# Patient Record
Sex: FEMALE | Race: BLACK OR AFRICAN AMERICAN | NOT HISPANIC OR LATINO
[De-identification: names, ages, dates, MRNs, and addresses within clinical notes are randomized per-mention and may not be internally consistent; named-entity substitution may affect disease eponyms.]

---

## 2017-01-24 ENCOUNTER — NON-APPOINTMENT (OUTPATIENT)
Age: 82
End: 2017-01-24

## 2017-01-24 ENCOUNTER — LABORATORY RESULT (OUTPATIENT)
Age: 82
End: 2017-01-24

## 2017-01-24 ENCOUNTER — APPOINTMENT (OUTPATIENT)
Dept: CARDIOLOGY | Facility: CLINIC | Age: 82
End: 2017-01-24

## 2017-01-24 VITALS — DIASTOLIC BLOOD PRESSURE: 68 MMHG | HEART RATE: 76 BPM | HEIGHT: 59 IN | SYSTOLIC BLOOD PRESSURE: 132 MMHG

## 2017-01-24 DIAGNOSIS — M79.89 OTHER SPECIFIED SOFT TISSUE DISORDERS: ICD-10-CM

## 2017-01-24 DIAGNOSIS — Z95.1 PRESENCE OF AORTOCORONARY BYPASS GRAFT: ICD-10-CM

## 2017-01-27 LAB
25(OH)D3 SERPL-MCNC: 30 NG/ML
ALBUMIN SERPL ELPH-MCNC: 4.2 G/DL
ALP BLD-CCNC: 128 U/L
ALT SERPL-CCNC: 19 U/L
ANION GAP SERPL CALC-SCNC: 20 MMOL/L
AST SERPL-CCNC: 31 U/L
BASOPHILS # BLD AUTO: 0.01 K/UL
BASOPHILS NFR BLD AUTO: 0.2 %
BILIRUB SERPL-MCNC: 0.4 MG/DL
BUN SERPL-MCNC: 21 MG/DL
CALCIUM SERPL-MCNC: 9.4 MG/DL
CHLORIDE SERPL-SCNC: 102 MMOL/L
CHOLEST SERPL-MCNC: 217 MG/DL
CHOLEST/HDLC SERPL: 2.6 RATIO
CO2 SERPL-SCNC: 21 MMOL/L
CREAT SERPL-MCNC: 1.77 MG/DL
EOSINOPHIL # BLD AUTO: 0.12 K/UL
EOSINOPHIL NFR BLD AUTO: 2.7 %
FT4I SERPL CALC-MCNC: 13.8 INDEX
GLUCOSE SERPL-MCNC: 108 MG/DL
HBA1C MFR BLD HPLC: 5.8 %
HCT VFR BLD CALC: 35.4 %
HDLC SERPL-MCNC: 84 MG/DL
HGB BLD-MCNC: 11.5 G/DL
IMM GRANULOCYTES NFR BLD AUTO: 0 %
LDLC SERPL CALC-MCNC: 122 MG/DL
LYMPHOCYTES # BLD AUTO: 1.9 K/UL
LYMPHOCYTES NFR BLD AUTO: 42.5 %
MAN DIFF?: NORMAL
MCHC RBC-ENTMCNC: 27.8 PG
MCHC RBC-ENTMCNC: 32.5 GM/DL
MCV RBC AUTO: 85.5 FL
MONOCYTES # BLD AUTO: 0.44 K/UL
MONOCYTES NFR BLD AUTO: 9.8 %
NEUTROPHILS # BLD AUTO: 2 K/UL
NEUTROPHILS NFR BLD AUTO: 44.8 %
PLATELET # BLD AUTO: 186 K/UL
POTASSIUM SERPL-SCNC: 4.2 MMOL/L
PROT SERPL-MCNC: 7.8 G/DL
RBC # BLD: 4.14 M/UL
RBC # FLD: 16.3 %
SODIUM SERPL-SCNC: 143 MMOL/L
T3 SERPL-MCNC: 60 NG/DL
T3RU NFR SERPL: 0.81 INDEX
T4 SERPL-MCNC: 11.1 UG/DL
TRIGL SERPL-MCNC: 57 MG/DL
TSH SERPL-ACNC: 1.07 UIU/ML
WBC # FLD AUTO: 4.47 K/UL

## 2017-02-01 ENCOUNTER — MEDICATION RENEWAL (OUTPATIENT)
Age: 82
End: 2017-02-01

## 2017-03-08 ENCOUNTER — RX RENEWAL (OUTPATIENT)
Age: 82
End: 2017-03-08

## 2017-05-13 ENCOUNTER — RX RENEWAL (OUTPATIENT)
Age: 82
End: 2017-05-13

## 2017-06-22 ENCOUNTER — APPOINTMENT (OUTPATIENT)
Dept: CARDIOLOGY | Facility: CLINIC | Age: 82
End: 2017-06-22

## 2017-06-22 ENCOUNTER — NON-APPOINTMENT (OUTPATIENT)
Age: 82
End: 2017-06-22

## 2017-06-22 ENCOUNTER — LABORATORY RESULT (OUTPATIENT)
Age: 82
End: 2017-06-22

## 2017-06-22 VITALS
BODY MASS INDEX: 29.84 KG/M2 | HEART RATE: 66 BPM | DIASTOLIC BLOOD PRESSURE: 68 MMHG | HEIGHT: 59 IN | SYSTOLIC BLOOD PRESSURE: 129 MMHG | WEIGHT: 148 LBS

## 2017-06-22 DIAGNOSIS — M79.602 PAIN IN LEFT ARM: ICD-10-CM

## 2017-06-22 DIAGNOSIS — E78.1 PURE HYPERGLYCERIDEMIA: ICD-10-CM

## 2017-06-24 LAB
25(OH)D3 SERPL-MCNC: 32.1 NG/ML
ALBUMIN SERPL ELPH-MCNC: 3.9 G/DL
ALP BLD-CCNC: 118 U/L
ALT SERPL-CCNC: 24 U/L
ANION GAP SERPL CALC-SCNC: 18 MMOL/L
APPEARANCE: CLEAR
AST SERPL-CCNC: 26 U/L
BACTERIA: ABNORMAL
BASOPHILS # BLD AUTO: 0.02 K/UL
BASOPHILS NFR BLD AUTO: 0.4 %
BILIRUB SERPL-MCNC: 0.3 MG/DL
BILIRUBIN URINE: NEGATIVE
BLOOD URINE: NEGATIVE
BUN SERPL-MCNC: 36 MG/DL
CALCIUM SERPL-MCNC: 8.9 MG/DL
CHLORIDE SERPL-SCNC: 104 MMOL/L
CHOLEST SERPL-MCNC: 158 MG/DL
CHOLEST/HDLC SERPL: 2.4 RATIO
CO2 SERPL-SCNC: 20 MMOL/L
COLOR: YELLOW
CREAT SERPL-MCNC: 2.34 MG/DL
EOSINOPHIL # BLD AUTO: 0.11 K/UL
EOSINOPHIL NFR BLD AUTO: 2.4 %
FT4I SERPL CALC-MCNC: 14.6 INDEX
GLUCOSE QUALITATIVE U: NORMAL MG/DL
GLUCOSE SERPL-MCNC: 111 MG/DL
HBA1C MFR BLD HPLC: 5.8 %
HCT VFR BLD CALC: 32.3 %
HDLC SERPL-MCNC: 65 MG/DL
HGB BLD-MCNC: 10.5 G/DL
HYALINE CASTS: 0 /LPF
IMM GRANULOCYTES NFR BLD AUTO: 0.2 %
KETONES URINE: NEGATIVE
LDLC SERPL CALC-MCNC: 84 MG/DL
LEUKOCYTE ESTERASE URINE: ABNORMAL
LYMPHOCYTES # BLD AUTO: 1.31 K/UL
LYMPHOCYTES NFR BLD AUTO: 29.1 %
MAN DIFF?: NORMAL
MCHC RBC-ENTMCNC: 28.3 PG
MCHC RBC-ENTMCNC: 32.5 GM/DL
MCV RBC AUTO: 87.1 FL
MICROSCOPIC-UA: NORMAL
MONOCYTES # BLD AUTO: 0.48 K/UL
MONOCYTES NFR BLD AUTO: 10.7 %
NEUTROPHILS # BLD AUTO: 2.57 K/UL
NEUTROPHILS NFR BLD AUTO: 57.2 %
NITRITE URINE: NEGATIVE
PH URINE: 6
PLATELET # BLD AUTO: 214 K/UL
POTASSIUM SERPL-SCNC: 4.7 MMOL/L
PROT SERPL-MCNC: 7.5 G/DL
PROTEIN URINE: NEGATIVE MG/DL
RBC # BLD: 3.71 M/UL
RBC # FLD: 16.2 %
RED BLOOD CELLS URINE: 2 /HPF
SODIUM SERPL-SCNC: 142 MMOL/L
SPECIFIC GRAVITY URINE: 1.01
SQUAMOUS EPITHELIAL CELLS: 2 /HPF
T3 SERPL-MCNC: 56 NG/DL
T3RU NFR SERPL: 0.8 INDEX
T4 SERPL-MCNC: 11.7 UG/DL
TRIGL SERPL-MCNC: 46 MG/DL
TSH SERPL-ACNC: 1.71 UIU/ML
UROBILINOGEN URINE: NORMAL MG/DL
WBC # FLD AUTO: 4.5 K/UL
WHITE BLOOD CELLS URINE: 3 /HPF

## 2017-07-12 ENCOUNTER — LABORATORY RESULT (OUTPATIENT)
Age: 82
End: 2017-07-12

## 2017-07-13 ENCOUNTER — RX RENEWAL (OUTPATIENT)
Age: 82
End: 2017-07-13

## 2017-08-01 ENCOUNTER — RX RENEWAL (OUTPATIENT)
Age: 82
End: 2017-08-01

## 2017-08-04 ENCOUNTER — MEDICATION RENEWAL (OUTPATIENT)
Age: 82
End: 2017-08-04

## 2017-08-14 ENCOUNTER — RX RENEWAL (OUTPATIENT)
Age: 82
End: 2017-08-14

## 2017-08-18 ENCOUNTER — INPATIENT (INPATIENT)
Facility: HOSPITAL | Age: 82
LOS: 3 days | Discharge: ANOTHER HOSPITAL | End: 2017-08-22
Attending: INTERNAL MEDICINE | Admitting: INTERNAL MEDICINE
Payer: COMMERCIAL

## 2017-08-18 VITALS
HEART RATE: 65 BPM | TEMPERATURE: 98 F | HEIGHT: 62 IN | RESPIRATION RATE: 20 BRPM | OXYGEN SATURATION: 97 % | DIASTOLIC BLOOD PRESSURE: 71 MMHG | SYSTOLIC BLOOD PRESSURE: 144 MMHG | WEIGHT: 139.99 LBS

## 2017-08-18 LAB
ALBUMIN SERPL ELPH-MCNC: 3.1 G/DL — LOW (ref 3.3–5)
ALP SERPL-CCNC: 124 U/L — HIGH (ref 40–120)
ALT FLD-CCNC: 40 U/L — SIGNIFICANT CHANGE UP (ref 12–78)
ANION GAP SERPL CALC-SCNC: 10 MMOL/L — SIGNIFICANT CHANGE UP (ref 5–17)
ANISOCYTOSIS BLD QL: SLIGHT — SIGNIFICANT CHANGE UP
APPEARANCE UR: ABNORMAL
APTT BLD: 31 SEC — SIGNIFICANT CHANGE UP (ref 27.5–37.4)
AST SERPL-CCNC: 40 U/L — HIGH (ref 15–37)
BACTERIA # UR AUTO: ABNORMAL
BASOPHILS NFR BLD AUTO: 1 % — SIGNIFICANT CHANGE UP (ref 0–2)
BILIRUB SERPL-MCNC: 0.4 MG/DL — SIGNIFICANT CHANGE UP (ref 0.2–1.2)
BILIRUB UR-MCNC: NEGATIVE — SIGNIFICANT CHANGE UP
BUN SERPL-MCNC: 31 MG/DL — HIGH (ref 7–23)
CALCIUM SERPL-MCNC: 8.4 MG/DL — LOW (ref 8.5–10.1)
CHLORIDE SERPL-SCNC: 97 MMOL/L — SIGNIFICANT CHANGE UP (ref 96–108)
CK MB BLD-MCNC: 0.6 % — SIGNIFICANT CHANGE UP (ref 0–3.5)
CK MB CFR SERPL CALC: 1.9 NG/ML — SIGNIFICANT CHANGE UP (ref 0.5–3.6)
CK SERPL-CCNC: 307 U/L — HIGH (ref 26–192)
CO2 SERPL-SCNC: 23 MMOL/L — SIGNIFICANT CHANGE UP (ref 22–31)
COLOR SPEC: YELLOW — SIGNIFICANT CHANGE UP
CREAT SERPL-MCNC: 2.25 MG/DL — HIGH (ref 0.5–1.3)
DIFF PNL FLD: ABNORMAL
EOSINOPHIL NFR BLD AUTO: 1 % — SIGNIFICANT CHANGE UP (ref 0–6)
EPI CELLS # UR: SIGNIFICANT CHANGE UP
GLUCOSE SERPL-MCNC: 97 MG/DL — SIGNIFICANT CHANGE UP (ref 70–99)
GLUCOSE UR QL: NEGATIVE MG/DL — SIGNIFICANT CHANGE UP
HCT VFR BLD CALC: 28.7 % — LOW (ref 34.5–45)
HGB BLD-MCNC: 9.6 G/DL — LOW (ref 11.5–15.5)
HYPOCHROMIA BLD QL: SLIGHT — SIGNIFICANT CHANGE UP
INR BLD: 1.2 RATIO — HIGH (ref 0.88–1.16)
KETONES UR-MCNC: NEGATIVE — SIGNIFICANT CHANGE UP
LACTATE SERPL-SCNC: 0.9 MMOL/L — SIGNIFICANT CHANGE UP (ref 0.7–2)
LEUKOCYTE ESTERASE UR-ACNC: ABNORMAL
LYMPHOCYTES # BLD AUTO: 28 % — SIGNIFICANT CHANGE UP (ref 13–44)
MCHC RBC-ENTMCNC: 29.2 PG — SIGNIFICANT CHANGE UP (ref 27–34)
MCHC RBC-ENTMCNC: 33.3 GM/DL — SIGNIFICANT CHANGE UP (ref 32–36)
MCV RBC AUTO: 87.7 FL — SIGNIFICANT CHANGE UP (ref 80–100)
MONOCYTES NFR BLD AUTO: 13 % — SIGNIFICANT CHANGE UP (ref 2–14)
NEUTROPHILS NFR BLD AUTO: 57 % — SIGNIFICANT CHANGE UP (ref 43–77)
NITRITE UR-MCNC: NEGATIVE — SIGNIFICANT CHANGE UP
NT-PROBNP SERPL-SCNC: 1908 PG/ML — HIGH (ref 0–450)
OVALOCYTES BLD QL SMEAR: SLIGHT — SIGNIFICANT CHANGE UP
PH UR: 6.5 — SIGNIFICANT CHANGE UP (ref 5–8)
PLAT MORPH BLD: NORMAL — SIGNIFICANT CHANGE UP
PLATELET # BLD AUTO: 195 K/UL — SIGNIFICANT CHANGE UP (ref 150–400)
POTASSIUM SERPL-MCNC: 5.6 MMOL/L — HIGH (ref 3.5–5.3)
POTASSIUM SERPL-SCNC: 5.6 MMOL/L — HIGH (ref 3.5–5.3)
PROT SERPL-MCNC: 7.4 GM/DL — SIGNIFICANT CHANGE UP (ref 6–8.3)
PROT UR-MCNC: NEGATIVE MG/DL — SIGNIFICANT CHANGE UP
PROTHROM AB SERPL-ACNC: 13.1 SEC — HIGH (ref 9.8–12.7)
RBC # BLD: 3.27 M/UL — LOW (ref 3.8–5.2)
RBC # FLD: 13.5 % — SIGNIFICANT CHANGE UP (ref 11–15)
RBC BLD AUTO: SIGNIFICANT CHANGE UP
RBC CASTS # UR COMP ASSIST: SIGNIFICANT CHANGE UP /HPF (ref 0–4)
SODIUM SERPL-SCNC: 130 MMOL/L — LOW (ref 135–145)
SP GR SPEC: 1 — LOW (ref 1.01–1.02)
TROPONIN I SERPL-MCNC: 0.09 NG/ML — HIGH (ref 0.01–0.04)
UROBILINOGEN FLD QL: NEGATIVE MG/DL — SIGNIFICANT CHANGE UP
WBC # BLD: 4.9 K/UL — SIGNIFICANT CHANGE UP (ref 3.8–10.5)
WBC # FLD AUTO: 4.9 K/UL — SIGNIFICANT CHANGE UP (ref 3.8–10.5)
WBC UR QL: SIGNIFICANT CHANGE UP

## 2017-08-18 PROCEDURE — 70450 CT HEAD/BRAIN W/O DYE: CPT | Mod: 26

## 2017-08-18 PROCEDURE — 71010: CPT | Mod: 26

## 2017-08-18 PROCEDURE — 93970 EXTREMITY STUDY: CPT | Mod: 26

## 2017-08-18 PROCEDURE — 99285 EMERGENCY DEPT VISIT HI MDM: CPT

## 2017-08-18 RX ORDER — ASPIRIN/CALCIUM CARB/MAGNESIUM 324 MG
325 TABLET ORAL DAILY
Qty: 0 | Refills: 0 | Status: DISCONTINUED | OUTPATIENT
Start: 2017-08-18 | End: 2017-08-22

## 2017-08-18 RX ORDER — VALSARTAN 80 MG/1
160 TABLET ORAL DAILY
Qty: 0 | Refills: 0 | Status: DISCONTINUED | OUTPATIENT
Start: 2017-08-18 | End: 2017-08-22

## 2017-08-18 RX ORDER — AMIODARONE HYDROCHLORIDE 400 MG/1
200 TABLET ORAL DAILY
Qty: 0 | Refills: 0 | Status: DISCONTINUED | OUTPATIENT
Start: 2017-08-18 | End: 2017-08-22

## 2017-08-18 RX ORDER — ALBUTEROL 90 UG/1
2.5 AEROSOL, METERED ORAL
Qty: 0 | Refills: 0 | Status: DISCONTINUED | OUTPATIENT
Start: 2017-08-18 | End: 2017-08-22

## 2017-08-18 RX ORDER — SODIUM POLYSTYRENE SULFONATE 4.1 MEQ/G
30 POWDER, FOR SUSPENSION ORAL ONCE
Qty: 0 | Refills: 0 | Status: COMPLETED | OUTPATIENT
Start: 2017-08-18 | End: 2017-08-18

## 2017-08-18 RX ORDER — DILTIAZEM HCL 120 MG
180 CAPSULE, EXT RELEASE 24 HR ORAL DAILY
Qty: 0 | Refills: 0 | Status: DISCONTINUED | OUTPATIENT
Start: 2017-08-18 | End: 2017-08-22

## 2017-08-18 RX ORDER — HEPARIN SODIUM 5000 [USP'U]/ML
5000 INJECTION INTRAVENOUS; SUBCUTANEOUS EVERY 12 HOURS
Qty: 0 | Refills: 0 | Status: DISCONTINUED | OUTPATIENT
Start: 2017-08-18 | End: 2017-08-21

## 2017-08-18 RX ORDER — SODIUM CHLORIDE 9 MG/ML
3 INJECTION INTRAMUSCULAR; INTRAVENOUS; SUBCUTANEOUS ONCE
Qty: 0 | Refills: 0 | Status: COMPLETED | OUTPATIENT
Start: 2017-08-18 | End: 2017-08-18

## 2017-08-18 RX ORDER — ALBUTEROL 90 UG/1
2.5 AEROSOL, METERED ORAL
Qty: 0 | Refills: 0 | Status: DISCONTINUED | OUTPATIENT
Start: 2017-08-18 | End: 2017-08-18

## 2017-08-18 RX ORDER — AZITHROMYCIN 500 MG/1
500 TABLET, FILM COATED ORAL ONCE
Qty: 0 | Refills: 0 | Status: COMPLETED | OUTPATIENT
Start: 2017-08-18 | End: 2017-08-18

## 2017-08-18 RX ORDER — CEFTRIAXONE 500 MG/1
1 INJECTION, POWDER, FOR SOLUTION INTRAMUSCULAR; INTRAVENOUS EVERY 24 HOURS
Qty: 0 | Refills: 0 | Status: DISCONTINUED | OUTPATIENT
Start: 2017-08-18 | End: 2017-08-22

## 2017-08-18 RX ORDER — AZITHROMYCIN 500 MG/1
500 TABLET, FILM COATED ORAL EVERY 24 HOURS
Qty: 0 | Refills: 0 | Status: DISCONTINUED | OUTPATIENT
Start: 2017-08-18 | End: 2017-08-22

## 2017-08-18 RX ORDER — CEFTRIAXONE 500 MG/1
1 INJECTION, POWDER, FOR SOLUTION INTRAMUSCULAR; INTRAVENOUS ONCE
Qty: 0 | Refills: 0 | Status: COMPLETED | OUTPATIENT
Start: 2017-08-18 | End: 2017-08-18

## 2017-08-18 RX ORDER — FUROSEMIDE 40 MG
40 TABLET ORAL DAILY
Qty: 0 | Refills: 0 | Status: DISCONTINUED | OUTPATIENT
Start: 2017-08-18 | End: 2017-08-22

## 2017-08-18 RX ORDER — PANTOPRAZOLE SODIUM 20 MG/1
40 TABLET, DELAYED RELEASE ORAL
Qty: 0 | Refills: 0 | Status: DISCONTINUED | OUTPATIENT
Start: 2017-08-18 | End: 2017-08-22

## 2017-08-18 RX ADMIN — CEFTRIAXONE 100 GRAM(S): 500 INJECTION, POWDER, FOR SOLUTION INTRAMUSCULAR; INTRAVENOUS at 19:07

## 2017-08-18 RX ADMIN — AZITHROMYCIN 255 MILLIGRAM(S): 500 TABLET, FILM COATED ORAL at 19:44

## 2017-08-18 RX ADMIN — SODIUM POLYSTYRENE SULFONATE 30 GRAM(S): 4.1 POWDER, FOR SUSPENSION ORAL at 23:25

## 2017-08-18 RX ADMIN — SODIUM CHLORIDE 3 MILLILITER(S): 9 INJECTION INTRAMUSCULAR; INTRAVENOUS; SUBCUTANEOUS at 17:51

## 2017-08-18 NOTE — ED PROVIDER NOTE - MEDICAL DECISION MAKING DETAILS
Pt with ++ significant edema likely causing inability to ambulate. however pt also noted with renal insufficiency, K of 5 and hyponatremia and unknown baseline. will also treat for pna. us neg for dvt. CT scan demonstrates no acute pathology. d/w dr. grace for admission

## 2017-08-18 NOTE — ED PROVIDER NOTE - NIH STROKE SCALE: 5B. MOTOR ARM, RIGHT, QM
(1) Drift; limb holds 90 (or 45) degrees, but drifts down before full 10 secs; does not hit bed or other support/old

## 2017-08-18 NOTE — ED ADULT TRIAGE NOTE - CHIEF COMPLAINT QUOTE
patient c/o of weakness and unable to ambulated started 1 week ago , patient c/o of swelling lower extremities and mild difficulty breathing on and off for 1 week

## 2017-08-18 NOTE — ED PROVIDER NOTE - NIH STROKE SCALE: 6B. MOTOR LEG, RIGHT, QM
old/(2) Some effort against gravity; leg falls to bed by 5 secs, but has some effort against gravity

## 2017-08-18 NOTE — ED ADULT NURSE NOTE - OBJECTIVE STATEMENT
Pt c/o difficulty ambulating x 1 week, bilateral leg swelling noted. denies chest pain, sob. s/p fall today with left hematoma. hx: cva with right side weakness

## 2017-08-18 NOTE — ED PROVIDER NOTE - NIH STROKE SCALE: 5A. MOTOR ARM, LEFT, QM
old/(1) Drift; limb holds 90 (or 45) degrees, but drifts down before full 10 seconds; does not hit bed or other support

## 2017-08-18 NOTE — ED PROVIDER NOTE - OBJECTIVE STATEMENT
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 Pt is AOx2 and denies cp, sob, dizziness, headache. However, pt and aid are poor historians. Pt at baseline ambulates with walker.    ROS: No fever/chills. No photophobia/eye pain/changes in vision, No ear pain/sore throat/dysphagia, No chest pain/palpitations. No SOB/cough/stridor. No abdominal pain, N/V/D, no black/bloody bm. No dysuria/frequency/discharge, No headache. No Dizziness.  No rash.  No numbness/tingling/weakness.

## 2017-08-18 NOTE — ED PROVIDER NOTE - CARE PLAN
Principal Discharge DX:	CHF (congestive heart failure)  Secondary Diagnosis:	Pneumonia  Secondary Diagnosis:	Renal insufficiency

## 2017-08-18 NOTE — ED PROVIDER NOTE - PHYSICAL EXAMINATION
Gen: Alert, Well appearing. NAD    Head: NC, AT, PERRL, EOMI, normal lids/conjunctiva   ENT: Bilateral TM WNL, normal hearing, patent oropharynx without erythema/exudate, uvula midline  Neck: supple, no tenderness/meningismus/JVD   Pulm: Bilateral clear BS, normal resp effort, no wheeze/stridor/retractions  CV: RRR, no M/R/G, +dist pulses   Abd: soft, NT/ND, +BS, no guarding/rebound tenderness  Mskel: +++ pitting edema to thigh,   Skin: no rash   Neuro: AAOx2

## 2017-08-18 NOTE — ED ADULT NURSE NOTE - PSH
CAD S/P percutaneous coronary angioplasty  1 stent placed 2009  one stent placed 2010  H/O breast biopsy  left , years ago  rectal prolapse repaired  20+ years ago  S/P dilatation and curettage  years ago  S/P total knee replacement  bilateral in 2009

## 2017-08-18 NOTE — ED ADULT NURSE NOTE - PMH
Anxiety    Aortic stenosis  severe s/p cardiac cath 9/21/12  Asthma    Moe esophagus    Bilateral cataracts    CAD (coronary artery disease)    CVA (cerebral infarction)    H/O heart artery stent    H/O spinal stenosis    HLD (hyperlipidemia)    HTN (hypertension)    Hyperlipidemia    Hypertension    Myeloblastic anemia    Myelodysplastic syndrome  diagnosed 5 years ago  Osteoarthritis

## 2017-08-19 DIAGNOSIS — I25.10 ATHEROSCLEROTIC HEART DISEASE OF NATIVE CORONARY ARTERY WITHOUT ANGINA PECTORIS: ICD-10-CM

## 2017-08-19 DIAGNOSIS — I21.4 NON-ST ELEVATION (NSTEMI) MYOCARDIAL INFARCTION: ICD-10-CM

## 2017-08-19 DIAGNOSIS — I50.9 HEART FAILURE, UNSPECIFIED: ICD-10-CM

## 2017-08-19 LAB
ALBUMIN SERPL ELPH-MCNC: 2.8 G/DL — LOW (ref 3.3–5)
ALP SERPL-CCNC: 102 U/L — SIGNIFICANT CHANGE UP (ref 40–120)
ALT FLD-CCNC: 38 U/L — SIGNIFICANT CHANGE UP (ref 12–78)
ANION GAP SERPL CALC-SCNC: 11 MMOL/L — SIGNIFICANT CHANGE UP (ref 5–17)
AST SERPL-CCNC: 32 U/L — SIGNIFICANT CHANGE UP (ref 15–37)
BILIRUB SERPL-MCNC: 0.4 MG/DL — SIGNIFICANT CHANGE UP (ref 0.2–1.2)
BUN SERPL-MCNC: 28 MG/DL — HIGH (ref 7–23)
CALCIUM SERPL-MCNC: 8.1 MG/DL — LOW (ref 8.5–10.1)
CHLORIDE SERPL-SCNC: 101 MMOL/L — SIGNIFICANT CHANGE UP (ref 96–108)
CO2 SERPL-SCNC: 25 MMOL/L — SIGNIFICANT CHANGE UP (ref 22–31)
CREAT SERPL-MCNC: 2.15 MG/DL — HIGH (ref 0.5–1.3)
GLUCOSE SERPL-MCNC: 91 MG/DL — SIGNIFICANT CHANGE UP (ref 70–99)
HCT VFR BLD CALC: 26.4 % — LOW (ref 34.5–45)
HGB BLD-MCNC: 8.6 G/DL — LOW (ref 11.5–15.5)
MCHC RBC-ENTMCNC: 28.7 PG — SIGNIFICANT CHANGE UP (ref 27–34)
MCHC RBC-ENTMCNC: 32.4 GM/DL — SIGNIFICANT CHANGE UP (ref 32–36)
MCV RBC AUTO: 88.4 FL — SIGNIFICANT CHANGE UP (ref 80–100)
PLATELET # BLD AUTO: 184 K/UL — SIGNIFICANT CHANGE UP (ref 150–400)
POTASSIUM SERPL-MCNC: 4.5 MMOL/L — SIGNIFICANT CHANGE UP (ref 3.5–5.3)
POTASSIUM SERPL-SCNC: 4.5 MMOL/L — SIGNIFICANT CHANGE UP (ref 3.5–5.3)
PROT SERPL-MCNC: 6.5 GM/DL — SIGNIFICANT CHANGE UP (ref 6–8.3)
RBC # BLD: 2.99 M/UL — LOW (ref 3.8–5.2)
RBC # FLD: 13.2 % — SIGNIFICANT CHANGE UP (ref 11–15)
SODIUM SERPL-SCNC: 137 MMOL/L — SIGNIFICANT CHANGE UP (ref 135–145)
TROPONIN I SERPL-MCNC: 0.06 NG/ML — HIGH (ref 0.01–0.04)
TROPONIN I SERPL-MCNC: 0.07 NG/ML — HIGH (ref 0.01–0.04)
WBC # BLD: 4.4 K/UL — SIGNIFICANT CHANGE UP (ref 3.8–10.5)
WBC # FLD AUTO: 4.4 K/UL — SIGNIFICANT CHANGE UP (ref 3.8–10.5)

## 2017-08-19 PROCEDURE — 93010 ELECTROCARDIOGRAM REPORT: CPT

## 2017-08-19 RX ADMIN — CEFTRIAXONE 100 GRAM(S): 500 INJECTION, POWDER, FOR SOLUTION INTRAMUSCULAR; INTRAVENOUS at 17:42

## 2017-08-19 RX ADMIN — Medication 20 MILLIGRAM(S): at 13:09

## 2017-08-19 RX ADMIN — Medication 325 MILLIGRAM(S): at 11:44

## 2017-08-19 RX ADMIN — HEPARIN SODIUM 5000 UNIT(S): 5000 INJECTION INTRAVENOUS; SUBCUTANEOUS at 17:39

## 2017-08-19 RX ADMIN — ALBUTEROL 2.5 MILLIGRAM(S): 90 AEROSOL, METERED ORAL at 11:10

## 2017-08-19 RX ADMIN — ALBUTEROL 2.5 MILLIGRAM(S): 90 AEROSOL, METERED ORAL at 23:48

## 2017-08-19 RX ADMIN — AZITHROMYCIN 255 MILLIGRAM(S): 500 TABLET, FILM COATED ORAL at 17:42

## 2017-08-19 RX ADMIN — ALBUTEROL 2.5 MILLIGRAM(S): 90 AEROSOL, METERED ORAL at 17:01

## 2017-08-19 RX ADMIN — PANTOPRAZOLE SODIUM 40 MILLIGRAM(S): 20 TABLET, DELAYED RELEASE ORAL at 07:03

## 2017-08-19 RX ADMIN — ALBUTEROL 2.5 MILLIGRAM(S): 90 AEROSOL, METERED ORAL at 01:48

## 2017-08-19 RX ADMIN — ALBUTEROL 2.5 MILLIGRAM(S): 90 AEROSOL, METERED ORAL at 05:59

## 2017-08-19 RX ADMIN — Medication 0.1 MILLIGRAM(S): at 17:39

## 2017-08-19 RX ADMIN — HEPARIN SODIUM 5000 UNIT(S): 5000 INJECTION INTRAVENOUS; SUBCUTANEOUS at 07:03

## 2017-08-19 RX ADMIN — Medication 40 MILLIGRAM(S): at 07:01

## 2017-08-19 NOTE — DIETITIAN INITIAL EVALUATION ADULT. - OTHER INFO
Pt seen for CHF.  Pt's daughter does cooking & food shopping. Pt with poor dentition presents with no difficulty chewing or swallowing. Pt consumed 75% breakfast & dinner with assist. Last BM x 1(8/19).

## 2017-08-19 NOTE — H&P ADULT - NSHPPHYSICALEXAM_GEN_ALL_CORE
PHYSICAL EXAM:  GENERAL: Appears stated age, NAD  HEENT: Atraumatic, EOMI, hearing normal, conjunctiva and sclera clear  Chest: CTA bilaterally, no rhonchi, rales or wheezing, sternal scar  CV: S1S2, RRR, no murmurs, rubs, or edema  GI: soft, +BS, NT/ND,   Psychiatric: affect nL, mood nL  Skin: warm and dry  CNS: Awake alert cooperative and follows command. No sensory or focal deficits.    Ext: 2+ edema

## 2017-08-19 NOTE — H&P ADULT - HISTORY OF PRESENT ILLNESS
83yo female with pmh aortic stenosis s/p AVR, CAD with stents, CABG (2012), HTN, HLD, mult CVA with b/l weakness (Rt > Lt) presents with LE edema and pain noted today, and difficulty ambulating for 1 week. Pt is AOx2 and denies cp, sob, dizziness, headache. However, pt and aid are poor historians. Pt at baseline ambulates with walker. No falls was reported and she has no headaches.

## 2017-08-19 NOTE — H&P ADULT - NSHPLABSRESULTS_GEN_ALL_CORE
18-Aug-2017 16:47, Complete Blood Count + Automated Diff  · WBC Count	4.9	[3.8 - 10.5 K/uL]  · RBC Count	  3.27	[3.80 - 5.20 M/uL]  · Hemoglobin	  9.6	[11.5 - 15.5 g/dL]  · Hematocrit	  28.7	[34.5 - 45.0 %]  · Mean Cell Volume	87.7	[80.0 - 100.0 fl]  · Mean Cell Hemoglobin	29.2	[27.0 - 34.0 pg]  · Mean Cell Hemoglobin Conc	33.3	[32.0 - 36.0 gm/dL]  · Red Cell Distrib Width	13.5	[11.0 - 15.0 %]  · Platelet Count - Automated	195	[150 - 400 K/uL]  · Auto Neutrophil %	      18-Aug-2017 16:48, CKMB Mass Assay  · CKMB Units	1.9	[0.5 - 3.6 ng/mL]  · CPK Mass Assay %	0.6	[0.0 - 3.5 %]    18-Aug-2017 16:48, Comprehensive Metabolic Panel  · Sodium, Serum	  130	[135 - 145 mmol/L]  · Potassium, Serum	  5.6	[3.5 - 5.3 mmol/L]  · Chloride, Serum	97	[96 - 108 mmol/L]  · Carbon Dioxide, Serum	23	[22 - 31 mmol/L]  · Anion Gap, Serum	10	[5 - 17 mmol/L]  · Blood Urea Nitrogen, Serum	  31	[7 - 23 mg/dL]  · Creatinine, Serum	  2.25	[0.50 - 1.30 mg/dL]  · Glucose, Serum	97	[70 - 99 mg/dL]  · Calcium, Total Serum	  8.4	[8.5 - 10.1 mg/dL]  · Protein Total, Serum	7.4	[6.0 - 8.3 gm/dL]  · Albumin, Serum	  3.1	[3.3 - 5.0 g/dL]  · Bilirubin Total, Serum	0.4	[0.2 - 1.2 mg/dL]  · Alkaline Phosphatase, Serum	  124	[40 - 120 U/L]  · Aspartate Aminotransferase (AST/SGOT)	  40	[15 - 37 U/L]  · Alanine Aminotransferase (ALT/SGPT)	40	[12 - 78 U/L]  · eGFR if Non African American	  20	[>=60 mL/min/1.73M2]      18-Aug-2017 16:48, Creatine Kinase, Serum  · Creatine Kinase, Serum	  307	[26 - 192 U/L]    18-Aug-2017 16:48, Serum Pro-Brain Natriuretic Peptide  · Serum Pro-Brain Natriuretic Peptide	  1908	[0 - 450 pg/mL]    18-Aug-2017 16:48, Troponin I, Serum  · Troponin I, Serum	  .086	[.015 - .045 ng/mL]  Coagulation:    18-Aug-2017 16:47, Activated Partial Thromboplastin Time  · Activated Partial Thromboplastin Time	31.0	[27.5 - 37.4 sec]  The recommended therapeutic heparin range (full dose) is 58-99 seconds.  	Recommended therapeutic Argatroban range is 1.5 to 3.0 times the baseline  	APTT value, not to exceed 100 seconds. Recommended therapeutic Refludan  	range is 1.5 to 2.5 times thebaseline APTT.    18-Aug-2017 16:47, Prothrombin Time and INR, Plasma  · Prothrombin Time, Plasma	  13.1	[9.8 - 12.7 sec]  Effective March 21st, the reference range for PT has changed.  · INR	  1.20	[0.88 - 1.16 ratio]  Please note: New Critical Value: 5.0 ratio as of 1/2/14.

## 2017-08-19 NOTE — CONSULT NOTE ADULT - SUBJECTIVE AND OBJECTIVE BOX
Patient is a 82y old  Female who presents with a chief complaint of  SOB      PAST MEDICAL & SURGICAL HISTORY:  CVA (cerebral infarction)  Anxiety  HLD (hyperlipidemia)  HTN (hypertension)  Hypertension  Hyperlipidemia  Bilateral cataracts  Osteoarthritis  Myelodysplastic syndrome: diagnosed 5 years ago  H/O spinal stenosis  Aortic stenosis: severe s/p cardiac cath 12  Asthma  Myeloblastic anemia  Moe esophagus  H/O heart artery stent  CAD (coronary artery disease)  S/P total knee replacement: bilateral in   H/O breast biopsy: left , years ago  S/P dilatation and curettage: years ago  CAD S/P percutaneous coronary angioplasty: 1 stent placed   one stent placed   rectal prolapse repaired: 20+ years ago      Summary of admission HPI: NSTEMI                PREVIOUS DIAGNOSTIC TESTING:      ECHO  FINDINGS:    STRESS  FINDINGS:    CATHETERIZATION  FINDINGS:    ELECTROPHYSIOLOGY STUDY  FINDINGS:    CAROTID ULTRASOUND:  FINDINGS    VENOUS DUPLEX SCAN:  FINDINGS:    CHEST CT PULMONARY ANGIO with IV Contrast:  FINDINGS:  MEDICATIONS  (STANDING):  aspirin enteric coated 325 milliGRAM(s) Oral daily  valsartan 160 milliGRAM(s) Oral daily  cloNIDine 0.1 milliGRAM(s) Oral two times a day  amiodarone    Tablet 200 milliGRAM(s) Oral daily  diltiazem    milliGRAM(s) Oral daily  PARoxetine 20 milliGRAM(s) Oral daily  furosemide    Tablet 40 milliGRAM(s) Oral daily  pantoprazole    Tablet 40 milliGRAM(s) Oral before breakfast  heparin  Injectable 5000 Unit(s) SubCutaneous every 12 hours  cefTRIAXone   IVPB 1 Gram(s) IV Intermittent every 24 hours  azithromycin  IVPB 500 milliGRAM(s) IV Intermittent every 24 hours  ALBUTerol    0.083% 2.5 milliGRAM(s) Nebulizer four times a day    MEDICATIONS  (PRN):      FAMILY HISTORY:  Family history of diabetes mellitus (Child): daughter      SOCIAL HISTORY:    CIGARETTES:    ALCOHOL:    REVIEW OF SYSTEMS:    CONSTITUTIONAL: No fever, weight loss, chills, shakes, or fatigue  EYES: No eye pain, visual disturbances, or discharge  ENMT:  No difficulty hearing, tinnitus, vertigo; No sinus or throat pain  NECK: No pain or stiffness  BREASTS: No pain, masses, or nipple discharge  RESPIRATORY: No cough, wheezing, hemoptysis, or shortness of breath  CARDIOVASCULAR: No chest pain, dyspnea, palpitations, dizziness, syncope, paroxysmal nocturnal dyspnea, orthopnea, or arm or leg swelling  GASTROINTESTINAL: No abdominal  or epigastric pain, nausea, vomiting, hematemesis, diarrhea, constipation, melena or bright red blood.  GENITOURINARY: No dysuria, nocturia, hematuria, or urinary incontinence  NEUROLOGICAL: No headaches, memory loss, slurred speech, limb weakness, loss of strength, numbness, or tremors  SKIN: No itching, burning, rashes, or lesions   LYMPH NODES: No enlarged glands  ENDOCRINE: No heat or cold intolerance, or hair loss  MUSCULOSKELETAL: No joint pain or swelling, muscle, back, or extremity pain  PSYCHIATRIC: No depression, anxiety, or difficulty sleeping  HEME/LYMPH: No easy bruising or bleeding gums  ALLERY AND IMMUNOLOGIC: No hives or rash.      Vital Signs Last 24 Hrs  T(C): 36.6 (19 Aug 2017 17:07), Max: 37.4 (18 Aug 2017 21:16)  T(F): 97.8 (19 Aug 2017 17:07), Max: 99.4 (18 Aug 2017 21:16)  HR: 60 (19 Aug 2017 17:07) (57 - 78)  BP: 112/59 (19 Aug 2017 17:07) (104/37 - 138/52)  BP(mean): --  RR: 16 (19 Aug 2017 17:07) (13 - 20)  SpO2: 100% (19 Aug 2017 17:07) (96% - 100%)    PHYSICAL EXAM:    GENERAL: In no apparent distress, well nourished, and hydrated.  HEAD:  Atraumatic, Normocephalic  EYES: EOMI, PERRLA, conjunctiva and sclera clear  ENMT: No tonsillar erythema, exudates, or enlargement; Moist mucous membranes, Good dentition, No lesions  NECK: Supple and normal thyroid.  No JVD or carotid bruit.  Carotid pulse is 2+ bilaterally.  HEART: Regular rate and rhythm; No murmurs, rubs, or gallops.  PULMONARY: Clear to auscultation and perfusion.  No rales, wheezing, or rhonchi bilaterally.  ABDOMEN: Soft, Nontender, Nondistended; Bowel sounds present  EXTREMITIES:  2+ Peripheral Pulses, No clubbing, cyanosis, or edema  LYMPH: No lymphadenopathy noted  NEUROLOGICAL: Grossly nonfocal          INTERPRETATION OF TELEMETRY:    ECG:    I&O's Detail      LABS:                        8.6    4.4   )-----------( 184      ( 19 Aug 2017 08:16 )             26.4     08-19    137  |  101  |  28<H>  ----------------------------<  91  4.5   |  25  |  2.15<H>    Ca    8.1<L>      19 Aug 2017 08:16    TPro  6.5  /  Alb  2.8<L>  /  TBili  0.4  /  DBili  x   /  AST  32  /  ALT  38  /  AlkPhos  102  0819    CARDIAC MARKERS ( 19 Aug 2017 08:16 )  .056 ng/mL / x     / x     / x     / x      CARDIAC MARKERS ( 19 Aug 2017 01:31 )  .070 ng/mL / x     / x     / x     / x      CARDIAC MARKERS ( 18 Aug 2017 16:48 )  .086 ng/mL / x     / 307 U/L / x     / 1.9 ng/mL      PT/INR - ( 18 Aug 2017 16:47 )   PT: 13.1 sec;   INR: 1.20 ratio         PTT - ( 18 Aug 2017 16:47 )  PTT:31.0 sec  Urinalysis Basic - ( 18 Aug 2017 19:34 )    Color: Yellow / Appearance: x / S.005 / pH: x  Gluc: x / Ketone: Negative  / Bili: Negative / Urobili: Negative mg/dL   Blood: x / Protein: Negative mg/dL / Nitrite: Negative   Leuk Esterase: Small / RBC: 0-2 /HPF / WBC 0-2   Sq Epi: x / Non Sq Epi: Few / Bacteria: Many      BNP  I&O's Detail    Daily Height in cm: 157.48 (19 Aug 2017 00:08)    Daily Weight in k (19 Aug 2017 14:49)    RADIOLOGY & ADDITIONAL STUDIES:

## 2017-08-19 NOTE — DIETITIAN INITIAL EVALUATION ADULT. - PERTINENT MEDS FT
Heparin, Ecotrin, Zithromax, Rocephin, Albuterol, Lasix, Protonix, Pacerone, Cardizem, Paxil, Diovan

## 2017-08-19 NOTE — DIETITIAN INITIAL EVALUATION ADULT. - PERTINENT LABORATORY DATA
08-19 Na 137 mmol/L Glu 91 mg/dL K+ 4.5 mmol/L Cr  2.15 mg/dL<H> BUN 28 mg/dL<H> Phos n/a   Alb 2.8 g/dL<L> PAB n/a   Hgb 8.6 g/dL<L> Hct 26.4 %<L>, [id number]

## 2017-08-20 RX ADMIN — HEPARIN SODIUM 5000 UNIT(S): 5000 INJECTION INTRAVENOUS; SUBCUTANEOUS at 17:43

## 2017-08-20 RX ADMIN — ALBUTEROL 2.5 MILLIGRAM(S): 90 AEROSOL, METERED ORAL at 05:15

## 2017-08-20 RX ADMIN — CEFTRIAXONE 100 GRAM(S): 500 INJECTION, POWDER, FOR SOLUTION INTRAMUSCULAR; INTRAVENOUS at 17:43

## 2017-08-20 RX ADMIN — Medication 0.1 MILLIGRAM(S): at 17:43

## 2017-08-20 RX ADMIN — ALBUTEROL 2.5 MILLIGRAM(S): 90 AEROSOL, METERED ORAL at 23:47

## 2017-08-20 RX ADMIN — Medication 40 MILLIGRAM(S): at 05:40

## 2017-08-20 RX ADMIN — AZITHROMYCIN 255 MILLIGRAM(S): 500 TABLET, FILM COATED ORAL at 17:43

## 2017-08-20 RX ADMIN — Medication 325 MILLIGRAM(S): at 11:22

## 2017-08-20 RX ADMIN — AMIODARONE HYDROCHLORIDE 200 MILLIGRAM(S): 400 TABLET ORAL at 05:40

## 2017-08-20 RX ADMIN — ALBUTEROL 2.5 MILLIGRAM(S): 90 AEROSOL, METERED ORAL at 11:50

## 2017-08-20 RX ADMIN — Medication 180 MILLIGRAM(S): at 08:53

## 2017-08-20 RX ADMIN — Medication 20 MILLIGRAM(S): at 11:22

## 2017-08-20 RX ADMIN — PANTOPRAZOLE SODIUM 40 MILLIGRAM(S): 20 TABLET, DELAYED RELEASE ORAL at 06:55

## 2017-08-20 RX ADMIN — ALBUTEROL 2.5 MILLIGRAM(S): 90 AEROSOL, METERED ORAL at 17:40

## 2017-08-20 RX ADMIN — HEPARIN SODIUM 5000 UNIT(S): 5000 INJECTION INTRAVENOUS; SUBCUTANEOUS at 05:40

## 2017-08-21 DIAGNOSIS — N28.9 DISORDER OF KIDNEY AND URETER, UNSPECIFIED: ICD-10-CM

## 2017-08-21 DIAGNOSIS — I10 ESSENTIAL (PRIMARY) HYPERTENSION: ICD-10-CM

## 2017-08-21 DIAGNOSIS — J18.9 PNEUMONIA, UNSPECIFIED ORGANISM: ICD-10-CM

## 2017-08-21 LAB
ANION GAP SERPL CALC-SCNC: 11 MMOL/L — SIGNIFICANT CHANGE UP (ref 5–17)
BUN SERPL-MCNC: 29 MG/DL — HIGH (ref 7–23)
CALCIUM SERPL-MCNC: 8 MG/DL — LOW (ref 8.5–10.1)
CHLORIDE SERPL-SCNC: 97 MMOL/L — SIGNIFICANT CHANGE UP (ref 96–108)
CO2 SERPL-SCNC: 24 MMOL/L — SIGNIFICANT CHANGE UP (ref 22–31)
CREAT SERPL-MCNC: 2.21 MG/DL — HIGH (ref 0.5–1.3)
GLUCOSE SERPL-MCNC: 225 MG/DL — HIGH (ref 70–99)
HCT VFR BLD CALC: 26.8 % — LOW (ref 34.5–45)
HGB BLD-MCNC: 9.1 G/DL — LOW (ref 11.5–15.5)
MCHC RBC-ENTMCNC: 29.6 PG — SIGNIFICANT CHANGE UP (ref 27–34)
MCHC RBC-ENTMCNC: 34 GM/DL — SIGNIFICANT CHANGE UP (ref 32–36)
MCV RBC AUTO: 87.1 FL — SIGNIFICANT CHANGE UP (ref 80–100)
PLATELET # BLD AUTO: 177 K/UL — SIGNIFICANT CHANGE UP (ref 150–400)
POTASSIUM SERPL-MCNC: 4.1 MMOL/L — SIGNIFICANT CHANGE UP (ref 3.5–5.3)
POTASSIUM SERPL-SCNC: 4.1 MMOL/L — SIGNIFICANT CHANGE UP (ref 3.5–5.3)
RBC # BLD: 3.08 M/UL — LOW (ref 3.8–5.2)
RBC # FLD: 13.7 % — SIGNIFICANT CHANGE UP (ref 11–15)
SODIUM SERPL-SCNC: 132 MMOL/L — LOW (ref 135–145)
WBC # BLD: 4.6 K/UL — SIGNIFICANT CHANGE UP (ref 3.8–10.5)
WBC # FLD AUTO: 4.6 K/UL — SIGNIFICANT CHANGE UP (ref 3.8–10.5)

## 2017-08-21 RX ORDER — ACETYLCYSTEINE 200 MG/ML
600 VIAL (ML) MISCELLANEOUS
Qty: 0 | Refills: 0 | Status: DISCONTINUED | OUTPATIENT
Start: 2017-08-21 | End: 2017-08-22

## 2017-08-21 RX ADMIN — Medication 40 MILLIGRAM(S): at 05:49

## 2017-08-21 RX ADMIN — PANTOPRAZOLE SODIUM 40 MILLIGRAM(S): 20 TABLET, DELAYED RELEASE ORAL at 06:29

## 2017-08-21 RX ADMIN — Medication 600 MILLIGRAM(S): at 17:39

## 2017-08-21 RX ADMIN — AMIODARONE HYDROCHLORIDE 200 MILLIGRAM(S): 400 TABLET ORAL at 05:49

## 2017-08-21 RX ADMIN — ALBUTEROL 2.5 MILLIGRAM(S): 90 AEROSOL, METERED ORAL at 17:00

## 2017-08-21 RX ADMIN — VALSARTAN 160 MILLIGRAM(S): 80 TABLET ORAL at 05:49

## 2017-08-21 RX ADMIN — CEFTRIAXONE 100 GRAM(S): 500 INJECTION, POWDER, FOR SOLUTION INTRAMUSCULAR; INTRAVENOUS at 17:17

## 2017-08-21 RX ADMIN — ALBUTEROL 2.5 MILLIGRAM(S): 90 AEROSOL, METERED ORAL at 11:12

## 2017-08-21 RX ADMIN — HEPARIN SODIUM 5000 UNIT(S): 5000 INJECTION INTRAVENOUS; SUBCUTANEOUS at 05:49

## 2017-08-21 RX ADMIN — ALBUTEROL 2.5 MILLIGRAM(S): 90 AEROSOL, METERED ORAL at 23:47

## 2017-08-21 RX ADMIN — Medication 0.1 MILLIGRAM(S): at 06:28

## 2017-08-21 RX ADMIN — Medication 0.1 MILLIGRAM(S): at 17:19

## 2017-08-21 RX ADMIN — Medication 180 MILLIGRAM(S): at 08:35

## 2017-08-21 RX ADMIN — Medication 20 MILLIGRAM(S): at 11:36

## 2017-08-21 RX ADMIN — ALBUTEROL 2.5 MILLIGRAM(S): 90 AEROSOL, METERED ORAL at 05:52

## 2017-08-21 RX ADMIN — Medication 325 MILLIGRAM(S): at 11:36

## 2017-08-21 RX ADMIN — AZITHROMYCIN 255 MILLIGRAM(S): 500 TABLET, FILM COATED ORAL at 17:16

## 2017-08-21 NOTE — PROGRESS NOTE ADULT - SUBJECTIVE AND OBJECTIVE BOX
Patient is a 82y old  Female who presents with a chief complaint of Difficulty walking. (19 Aug 2017 19:48)        PAST MEDICAL & SURGICAL HISTORY:  CVA (cerebral infarction)  Anxiety  HLD (hyperlipidemia)  HTN (hypertension)  Hypertension  Hyperlipidemia  Bilateral cataracts  Osteoarthritis  Myelodysplastic syndrome: diagnosed 5 years ago  H/O spinal stenosis  Aortic stenosis: severe s/p cardiac cath 12  Asthma  Myeloblastic anemia  Moe esophagus  H/O heart artery stent  CAD (coronary artery disease)  S/P total knee replacement: bilateral in   H/O breast biopsy: left , years ago  S/P dilatation and curettage: years ago  CAD S/P percutaneous coronary angioplasty: 1 stent placed   one stent placed   rectal prolapse repaired: 20+ years ago      Summary of admission HPI:                PREVIOUS DIAGNOSTIC TESTING:      ECHO  FINDINGS:    STRESS  FINDINGS:    CATHETERIZATION  FINDINGS:    ELECTROPHYSIOLOGY STUDY  FINDINGS:    CAROTID ULTRASOUND:  FINDINGS    VENOUS DUPLEX SCAN:  FINDINGS:    CHEST CT PULMONARY ANGIO with IV Contrast:  FINDINGS:  MEDICATIONS  (STANDING):  aspirin enteric coated 325 milliGRAM(s) Oral daily  valsartan 160 milliGRAM(s) Oral daily  cloNIDine 0.1 milliGRAM(s) Oral two times a day  amiodarone    Tablet 200 milliGRAM(s) Oral daily  diltiazem    milliGRAM(s) Oral daily  PARoxetine 20 milliGRAM(s) Oral daily  furosemide    Tablet 40 milliGRAM(s) Oral daily  pantoprazole    Tablet 40 milliGRAM(s) Oral before breakfast  cefTRIAXone   IVPB 1 Gram(s) IV Intermittent every 24 hours  azithromycin  IVPB 500 milliGRAM(s) IV Intermittent every 24 hours  ALBUTerol    0.083% 2.5 milliGRAM(s) Nebulizer four times a day  acetylcysteine  Oral Solution 600 milliGRAM(s) Oral two times a day    MEDICATIONS  (PRN):      FAMILY HISTORY:  Family history of diabetes mellitus (Child): daughter      SOCIAL HISTORY:    CIGARETTES:    ALCOHOL:    REVIEW OF SYSTEMS:    CONSTITUTIONAL: No fever, weight loss, chills, shakes, or fatigue  EYES: No eye pain, visual disturbances, or discharge  ENMT:  No difficulty hearing, tinnitus, vertigo; No sinus or throat pain  NECK: No pain or stiffness  BREASTS: No pain, masses, or nipple discharge  RESPIRATORY: No cough, wheezing, hemoptysis, or shortness of breath  CARDIOVASCULAR: No chest pain, dyspnea, palpitations, dizziness, syncope, paroxysmal nocturnal dyspnea, orthopnea, or arm or leg swelling  GASTROINTESTINAL: No abdominal  or epigastric pain, nausea, vomiting, hematemesis, diarrhea, constipation, melena or bright red blood.  GENITOURINARY: No dysuria, nocturia, hematuria, or urinary incontinence  NEUROLOGICAL: No headaches, memory loss, slurred speech, limb weakness, loss of strength, numbness, or tremors  SKIN: No itching, burning, rashes, or lesions   LYMPH NODES: No enlarged glands  ENDOCRINE: No heat or cold intolerance, or hair loss  MUSCULOSKELETAL: No joint pain or swelling, muscle, back, or extremity pain  PSYCHIATRIC: No depression, anxiety, or difficulty sleeping  HEME/LYMPH: No easy bruising or bleeding gums  ALLERY AND IMMUNOLOGIC: No hives or rash.      Vital Signs Last 24 Hrs  T(C): 36.3 (21 Aug 2017 05:40), Max: 37.6 (20 Aug 2017 17:02)  T(F): 97.4 (21 Aug 2017 05:40), Max: 99.6 (20 Aug 2017 17:02)  HR: 62 (21 Aug 2017 07:20) (61 - 74)  BP: 106/52 (21 Aug 2017 07:20) (106/52 - 125/46)  BP(mean): --  RR: 16 (21 Aug 2017 05:40) (16 - 16)  SpO2: 97% (21 Aug 2017 06:48) (97% - 100%)    PHYSICAL EXAM:    GENERAL: In no apparent distress, well nourished, and hydrated.  HEAD:  Atraumatic, Normocephalic  EYES: EOMI, PERRLA, conjunctiva and sclera clear  ENMT: No tonsillar erythema, exudates, or enlargement; Moist mucous membranes, Good dentition, No lesions  NECK: Supple and normal thyroid.  No JVD or carotid bruit.  Carotid pulse is 2+ bilaterally.  HEART: Regular rate and rhythm; No murmurs, rubs, or gallops.  PULMONARY: Clear to auscultation and perfusion.  No rales, wheezing, or rhonchi bilaterally.  ABDOMEN: Soft, Nontender, Nondistended; Bowel sounds present  EXTREMITIES:  2+ Peripheral Pulses, No clubbing, cyanosis, or edema  LYMPH: No lymphadenopathy noted  NEUROLOGICAL: Grossly nonfocal          INTERPRETATION OF TELEMETRY:    ECG:    I&O's Detail    20 Aug 2017 07:01  -  21 Aug 2017 07:00  --------------------------------------------------------  IN:    Oral Fluid: 330 mL  Total IN: 330 mL    OUT:  Total OUT: 0 mL    Total NET: 330 mL          LABS:                        9.1    4.6   )-----------( 177      ( 21 Aug 2017 10:52 )             26.8     -    132<L>  |  97  |  29<H>  ----------------------------<  225<H>  4.1   |  24  |  2.21<H>    Ca    8.0<L>      21 Aug 2017 10:52              BNP  I&O's Detail    20 Aug 2017 07:01  -  21 Aug 2017 07:00  --------------------------------------------------------  IN:    Oral Fluid: 330 mL  Total IN: 330 mL    OUT:  Total OUT: 0 mL    Total NET: 330 mL        Daily     Daily Weight in k.2 (21 Aug 2017 05:40)    RADIOLOGY & ADDITIONAL STUDIES:

## 2017-08-21 NOTE — PHYSICAL THERAPY INITIAL EVALUATION ADULT - PLANNED THERAPY INTERVENTIONS, PT EVAL
motor coordination training/stretching/neuromuscular re-education/bed mobility training/gait training/strengthening/ROM/balance training/transfer training/postural re-education

## 2017-08-21 NOTE — PROGRESS NOTE ADULT - SUBJECTIVE AND OBJECTIVE BOX
Patient is a 82y old  Female who presents with a chief complaint of     INTERVAL HPI/OVERNIGHT EVENTS:    MEDICATIONS  (STANDING):  aspirin enteric coated 325 milliGRAM(s) Oral daily  valsartan 160 milliGRAM(s) Oral daily  cloNIDine 0.1 milliGRAM(s) Oral two times a day  amiodarone    Tablet 200 milliGRAM(s) Oral daily  diltiazem    milliGRAM(s) Oral daily  PARoxetine 20 milliGRAM(s) Oral daily  furosemide    Tablet 40 milliGRAM(s) Oral daily  pantoprazole    Tablet 40 milliGRAM(s) Oral before breakfast  heparin  Injectable 5000 Unit(s) SubCutaneous every 12 hours  cefTRIAXone   IVPB 1 Gram(s) IV Intermittent every 24 hours  azithromycin  IVPB 500 milliGRAM(s) IV Intermittent every 24 hours  ALBUTerol    0.083% 2.5 milliGRAM(s) Nebulizer four times a day    MEDICATIONS  (PRN):        REVIEW OF SYSTEMS:  CONSTITUTIONAL: No fever, weight loss, or fatigue  EYES: No eye pain, visual disturbances, or discharge  ENMT:  No difficulty hearing, tinnitus, vertigo; No sinus or throat pain  NECK: No pain or stiffness  RESPIRATORY: No cough, wheezing, chills or hemoptysis; No shortness of breath  CARDIOVASCULAR: No chest pain, palpitations, dizziness, or leg swelling  GASTROINTESTINAL: No abdominal or epigastric pain. No nausea, vomiting, or hematemesis; No diarrhea or constipation. No melena or hematochezia.  GENITOURINARY: No dysuria, frequency, hematuria, or incontinence  NEUROLOGICAL: No headaches, memory loss, loss of strength, numbness, or tremors  SKIN: No itching, burning, rashes, or lesions      Vital Signs Last 24 Hrs  T(C): 36.3 (21 Aug 2017 05:40), Max: 37.6 (20 Aug 2017 17:02)  T(F): 97.4 (21 Aug 2017 05:40), Max: 99.6 (20 Aug 2017 17:02)  HR: 62 (21 Aug 2017 07:20) (61 - 74)  BP: 106/52 (21 Aug 2017 07:20) (106/52 - 125/57)  BP(mean): --  RR: 16 (21 Aug 2017 05:40) (16 - 16)  SpO2: 97% (21 Aug 2017 06:48) (97% - 100%)    PHYSICAL EXAM:  GENERAL: NAD, well-groomed, well-developed  HEAD:  Atraumatic, Normocephalic  EYES: EOMI, PERRLA, conjunctiva and sclera clear  ENMT: No tonsillar erythema, exudates, or enlargement; Moist mucous membranes   NECK: Supple, No JVD   NERVOUS SYSTEM:  Alert & Oriented X3, Good concentration; Motor Strength 5/5 B/L upper and lower extremities; DTRs 2+ intact and symmetric  CHEST/LUNG: Clear to percussion bilaterally; No rales, rhonchi, wheezing, or rubs  HEART: Regular rate and rhythm; No murmurs, rubs, or gallops  ABDOMEN: Soft, Nontender, Nondistended; Bowel sounds present  EXTREMITIES:  2+ Peripheral Pulses, No clubbing, cyanosis, or edema  LYMPH: No lymphadenopathy noted  SKIN: No rashes or lesions    LABS:              CAPILLARY BLOOD GLUCOSE          RADIOLOGY & ADDITIONAL TESTS:    Imaging Personally Reviewed:  [ ] YES  [ ] NO    Consultant(s) Notes Reviewed:  [ ] YES  [ ] NO    Care Discussed with Consultants/Other Providers [ ] YES  [ ] NO Patient is a 82y old  Female who presents with a chief complaint of     INTERVAL HPI/OVERNIGHT EVENTS:  rx for difficulty moving.. Pedal edema++ - improving  elevated trop ? renal clearance  edema is reduced    MEDICATIONS  (STANDING):  aspirin enteric coated 325 milliGRAM(s) Oral daily  valsartan 160 milliGRAM(s) Oral daily  cloNIDine 0.1 milliGRAM(s) Oral two times a day  amiodarone    Tablet 200 milliGRAM(s) Oral daily  diltiazem    milliGRAM(s) Oral daily  PARoxetine 20 milliGRAM(s) Oral daily  furosemide    Tablet 40 milliGRAM(s) Oral daily  pantoprazole    Tablet 40 milliGRAM(s) Oral before breakfast  heparin  Injectable 5000 Unit(s) SubCutaneous every 12 hours  cefTRIAXone   IVPB 1 Gram(s) IV Intermittent every 24 hours  azithromycin  IVPB 500 milliGRAM(s) IV Intermittent every 24 hours  ALBUTerol    0.083% 2.5 milliGRAM(s) Nebulizer four times a day    MEDICATIONS  (PRN):        REVIEW OF SYSTEMS:  CONSTITUTIONAL: No fever, weight loss, or fatigue  EYES: No eye pain, visual disturbances, or discharge  ENMT:  No difficulty hearing, tinnitus, vertigo; No sinus or throat pain  NECK: No pain or stiffness  RESPIRATORY: No cough, wheezing, chills or hemoptysis; No shortness of breath  CARDIOVASCULAR: No chest pain, palpitations, dizziness, or leg swelling  GASTROINTESTINAL: No abdominal or epigastric pain. No nausea, vomiting, or hematemesis; No diarrhea or constipation. No melena or hematochezia.  GENITOURINARY: No dysuria, frequency, hematuria, or incontinence  NEUROLOGICAL: No headaches, memory loss, loss of strength, numbness, or tremors  SKIN: No itching, burning, rashes, or lesions      Vital Signs Last 24 Hrs  T(C): 36.3 (21 Aug 2017 05:40), Max: 37.6 (20 Aug 2017 17:02)  T(F): 97.4 (21 Aug 2017 05:40), Max: 99.6 (20 Aug 2017 17:02)  HR: 62 (21 Aug 2017 07:20) (61 - 74)  BP: 106/52 (21 Aug 2017 07:20) (106/52 - 125/57)  BP(mean): --  RR: 16 (21 Aug 2017 05:40) (16 - 16)  SpO2: 97% (21 Aug 2017 06:48) (97% - 100%)    PHYSICAL EXAM:  GENERAL: NAD, well-groomed, well-developed  HEAD:  Atraumatic, Normocephalic  EYES: EOMI, PERRLA, conjunctiva and sclera clear  ENMT: No tonsillar erythema, exudates, or enlargement; Moist mucous membranes   NECK: Supple, No JVD   NERVOUS SYSTEM:  Alert & Oriented X3, Good concentration; Motor Strength 5/5 B/L upper and lower extremities; DTRs 2+ intact and symmetric  CHEST/LUNG: Clear to percussion bilaterally; No rales, rhonchi, wheezing, or rubs  HEART: Regular rate and rhythm; No murmurs, rubs, or gallops  ABDOMEN: Soft, Nontender, Nondistended; Bowel sounds present  EXTREMITIES:  2+ Peripheral Pulses, No clubbing, bipedal edema++  LYMPH: No lymphadenopathy noted  SKIN: No rashes or lesions    LABS:              CAPILLARY BLOOD GLUCOSE          RADIOLOGY & ADDITIONAL TESTS:    Imaging Personally Reviewed:  [ ] YES  [ ] NO    Consultant(s) Notes Reviewed:  [ ] YES  [ ] NO    Care Discussed with Consultants/Other Providers [ ] YES  [ ] NO

## 2017-08-21 NOTE — PHYSICAL THERAPY INITIAL EVALUATION ADULT - IMPAIRMENTS FOUND, PT EVAL
posture/gross motor/ROM/gait, locomotion, and balance/cognitive impairment/tone/neuromotor development and sensory integration/poor safety awareness/muscle strength

## 2017-08-22 ENCOUNTER — INPATIENT (INPATIENT)
Facility: HOSPITAL | Age: 82
LOS: 6 days | Discharge: NURSING, REHAB OR LONG-TERM CARE | DRG: 246 | End: 2017-08-29
Attending: INTERNAL MEDICINE | Admitting: SPECIALIST
Payer: COMMERCIAL

## 2017-08-22 VITALS
WEIGHT: 154.32 LBS | SYSTOLIC BLOOD PRESSURE: 124 MMHG | HEART RATE: 61 BPM | TEMPERATURE: 99 F | OXYGEN SATURATION: 99 % | HEIGHT: 62 IN | RESPIRATION RATE: 20 BRPM | DIASTOLIC BLOOD PRESSURE: 45 MMHG

## 2017-08-22 VITALS
OXYGEN SATURATION: 97 % | DIASTOLIC BLOOD PRESSURE: 52 MMHG | TEMPERATURE: 97 F | RESPIRATION RATE: 17 BRPM | SYSTOLIC BLOOD PRESSURE: 135 MMHG | HEART RATE: 61 BPM

## 2017-08-22 DIAGNOSIS — I50.9 HEART FAILURE, UNSPECIFIED: ICD-10-CM

## 2017-08-22 DIAGNOSIS — I21.4 NON-ST ELEVATION (NSTEMI) MYOCARDIAL INFARCTION: ICD-10-CM

## 2017-08-22 DIAGNOSIS — N30.00 ACUTE CYSTITIS WITHOUT HEMATURIA: ICD-10-CM

## 2017-08-22 DIAGNOSIS — J15.6 PNEUMONIA DUE TO OTHER GRAM-NEGATIVE BACTERIA: ICD-10-CM

## 2017-08-22 DIAGNOSIS — I20.0 UNSTABLE ANGINA: ICD-10-CM

## 2017-08-22 DIAGNOSIS — R26.2 DIFFICULTY IN WALKING, NOT ELSEWHERE CLASSIFIED: ICD-10-CM

## 2017-08-22 LAB
ALBUMIN SERPL ELPH-MCNC: 3.6 G/DL — SIGNIFICANT CHANGE UP (ref 3.3–5)
ALP SERPL-CCNC: 131 U/L — HIGH (ref 40–120)
ALT FLD-CCNC: 32 U/L RC — SIGNIFICANT CHANGE UP (ref 10–45)
ANION GAP SERPL CALC-SCNC: 17 MMOL/L — SIGNIFICANT CHANGE UP (ref 5–17)
APTT BLD: 71.9 SEC — HIGH (ref 27.5–37.4)
AST SERPL-CCNC: 29 U/L — SIGNIFICANT CHANGE UP (ref 10–40)
BILIRUB SERPL-MCNC: 0.4 MG/DL — SIGNIFICANT CHANGE UP (ref 0.2–1.2)
BLD GP AB SCN SERPL QL: NEGATIVE — SIGNIFICANT CHANGE UP
BUN SERPL-MCNC: 38 MG/DL — HIGH (ref 7–23)
CALCIUM SERPL-MCNC: 9.2 MG/DL — SIGNIFICANT CHANGE UP (ref 8.4–10.5)
CHLORIDE SERPL-SCNC: 92 MMOL/L — LOW (ref 96–108)
CK MB BLD-MCNC: 1.1 % — SIGNIFICANT CHANGE UP (ref 0–3.5)
CK MB CFR SERPL CALC: 2.3 NG/ML — SIGNIFICANT CHANGE UP (ref 0–3.8)
CK SERPL-CCNC: 202 U/L — HIGH (ref 25–170)
CO2 SERPL-SCNC: 24 MMOL/L — SIGNIFICANT CHANGE UP (ref 22–31)
CREAT SERPL-MCNC: 2.34 MG/DL — HIGH (ref 0.5–1.3)
GLUCOSE SERPL-MCNC: 99 MG/DL — SIGNIFICANT CHANGE UP (ref 70–99)
HCT VFR BLD CALC: 30.4 % — LOW (ref 34.5–45)
HGB BLD-MCNC: 10.1 G/DL — LOW (ref 11.5–15.5)
MAGNESIUM SERPL-MCNC: 2.3 MG/DL — SIGNIFICANT CHANGE UP (ref 1.6–2.6)
MCHC RBC-ENTMCNC: 30 PG — SIGNIFICANT CHANGE UP (ref 27–34)
MCHC RBC-ENTMCNC: 33.3 GM/DL — SIGNIFICANT CHANGE UP (ref 32–36)
MCV RBC AUTO: 90 FL — SIGNIFICANT CHANGE UP (ref 80–100)
PHOSPHATE SERPL-MCNC: 4.7 MG/DL — HIGH (ref 2.5–4.5)
PLATELET # BLD AUTO: 210 K/UL — SIGNIFICANT CHANGE UP (ref 150–400)
POTASSIUM SERPL-MCNC: 4.7 MMOL/L — SIGNIFICANT CHANGE UP (ref 3.5–5.3)
POTASSIUM SERPL-SCNC: 4.7 MMOL/L — SIGNIFICANT CHANGE UP (ref 3.5–5.3)
PROT SERPL-MCNC: 7.8 G/DL — SIGNIFICANT CHANGE UP (ref 6–8.3)
RBC # BLD: 3.38 M/UL — LOW (ref 3.8–5.2)
RBC # FLD: 13.8 % — SIGNIFICANT CHANGE UP (ref 10.3–14.5)
RH IG SCN BLD-IMP: POSITIVE — SIGNIFICANT CHANGE UP
SODIUM SERPL-SCNC: 133 MMOL/L — LOW (ref 135–145)
TROPONIN T SERPL-MCNC: 0.02 NG/ML — SIGNIFICANT CHANGE UP (ref 0–0.06)
WBC # BLD: 4.5 K/UL — SIGNIFICANT CHANGE UP (ref 3.8–10.5)
WBC # FLD AUTO: 4.5 K/UL — SIGNIFICANT CHANGE UP (ref 3.8–10.5)

## 2017-08-22 PROCEDURE — 92928 PRQ TCAT PLMT NTRAC ST 1 LES: CPT | Mod: LC,GC

## 2017-08-22 PROCEDURE — 92941 PRQ TRLML REVSC TOT OCCL AMI: CPT | Mod: GC,LM

## 2017-08-22 PROCEDURE — 93010 ELECTROCARDIOGRAM REPORT: CPT

## 2017-08-22 RX ORDER — CEFTRIAXONE 500 MG/1
INJECTION, POWDER, FOR SOLUTION INTRAMUSCULAR; INTRAVENOUS
Qty: 0 | Refills: 0 | Status: DISCONTINUED | OUTPATIENT
Start: 2017-08-22 | End: 2017-08-27

## 2017-08-22 RX ORDER — AMIODARONE HYDROCHLORIDE 400 MG/1
1 TABLET ORAL
Qty: 0 | Refills: 0 | COMMUNITY

## 2017-08-22 RX ORDER — AZITHROMYCIN 500 MG/1
500 TABLET, FILM COATED ORAL EVERY 24 HOURS
Qty: 0 | Refills: 0 | Status: DISCONTINUED | OUTPATIENT
Start: 2017-08-23 | End: 2017-08-26

## 2017-08-22 RX ORDER — VALSARTAN 80 MG/1
160 TABLET ORAL DAILY
Qty: 0 | Refills: 0 | Status: DISCONTINUED | OUTPATIENT
Start: 2017-08-22 | End: 2017-08-22

## 2017-08-22 RX ORDER — CEFTRIAXONE 500 MG/1
1 INJECTION, POWDER, FOR SOLUTION INTRAMUSCULAR; INTRAVENOUS ONCE
Qty: 0 | Refills: 0 | Status: COMPLETED | OUTPATIENT
Start: 2017-08-22 | End: 2017-08-22

## 2017-08-22 RX ORDER — CLOPIDOGREL BISULFATE 75 MG/1
75 TABLET, FILM COATED ORAL DAILY
Qty: 0 | Refills: 0 | Status: DISCONTINUED | OUTPATIENT
Start: 2017-08-22 | End: 2017-08-29

## 2017-08-22 RX ORDER — FUROSEMIDE 40 MG
40 TABLET ORAL DAILY
Qty: 0 | Refills: 0 | Status: DISCONTINUED | OUTPATIENT
Start: 2017-08-22 | End: 2017-08-29

## 2017-08-22 RX ORDER — AMIODARONE HYDROCHLORIDE 400 MG/1
200 TABLET ORAL DAILY
Qty: 0 | Refills: 0 | Status: DISCONTINUED | OUTPATIENT
Start: 2017-08-22 | End: 2017-08-29

## 2017-08-22 RX ORDER — ASPIRIN/CALCIUM CARB/MAGNESIUM 324 MG
81 TABLET ORAL DAILY
Qty: 0 | Refills: 0 | Status: DISCONTINUED | OUTPATIENT
Start: 2017-08-22 | End: 2017-08-23

## 2017-08-22 RX ORDER — CEFTRIAXONE 500 MG/1
1 INJECTION, POWDER, FOR SOLUTION INTRAMUSCULAR; INTRAVENOUS EVERY 24 HOURS
Qty: 0 | Refills: 0 | Status: DISCONTINUED | OUTPATIENT
Start: 2017-08-23 | End: 2017-08-27

## 2017-08-22 RX ORDER — AZITHROMYCIN 500 MG/1
TABLET, FILM COATED ORAL
Qty: 0 | Refills: 0 | Status: DISCONTINUED | OUTPATIENT
Start: 2017-08-22 | End: 2017-08-26

## 2017-08-22 RX ORDER — QUETIAPINE FUMARATE 200 MG/1
50 TABLET, FILM COATED ORAL AT BEDTIME
Qty: 0 | Refills: 0 | Status: DISCONTINUED | OUTPATIENT
Start: 2017-08-22 | End: 2017-08-22

## 2017-08-22 RX ORDER — PANTOPRAZOLE SODIUM 20 MG/1
40 TABLET, DELAYED RELEASE ORAL
Qty: 0 | Refills: 0 | Status: DISCONTINUED | OUTPATIENT
Start: 2017-08-22 | End: 2017-08-29

## 2017-08-22 RX ORDER — AZITHROMYCIN 500 MG/1
500 TABLET, FILM COATED ORAL ONCE
Qty: 0 | Refills: 0 | Status: COMPLETED | OUTPATIENT
Start: 2017-08-22 | End: 2017-08-22

## 2017-08-22 RX ORDER — FUROSEMIDE 40 MG
1 TABLET ORAL
Qty: 0 | Refills: 0 | COMMUNITY

## 2017-08-22 RX ORDER — ATORVASTATIN CALCIUM 80 MG/1
80 TABLET, FILM COATED ORAL AT BEDTIME
Qty: 0 | Refills: 0 | Status: DISCONTINUED | OUTPATIENT
Start: 2017-08-22 | End: 2017-08-29

## 2017-08-22 RX ADMIN — Medication 40 MILLIGRAM(S): at 06:28

## 2017-08-22 RX ADMIN — AMIODARONE HYDROCHLORIDE 200 MILLIGRAM(S): 400 TABLET ORAL at 06:28

## 2017-08-22 RX ADMIN — Medication 20 MILLIGRAM(S): at 11:11

## 2017-08-22 RX ADMIN — PANTOPRAZOLE SODIUM 40 MILLIGRAM(S): 20 TABLET, DELAYED RELEASE ORAL at 06:28

## 2017-08-22 RX ADMIN — CEFTRIAXONE 100 GRAM(S): 500 INJECTION, POWDER, FOR SOLUTION INTRAMUSCULAR; INTRAVENOUS at 23:10

## 2017-08-22 RX ADMIN — Medication 325 MILLIGRAM(S): at 11:11

## 2017-08-22 RX ADMIN — Medication 180 MILLIGRAM(S): at 06:28

## 2017-08-22 RX ADMIN — Medication 600 MILLIGRAM(S): at 06:28

## 2017-08-22 RX ADMIN — ALBUTEROL 2.5 MILLIGRAM(S): 90 AEROSOL, METERED ORAL at 05:30

## 2017-08-22 NOTE — DISCHARGE NOTE ADULT - NS AS DC HF EDUCATION INSTRUCTIONS
Activities as tolerated/Call Primary Care Provider for follow-up after discharge/Monitor Weight Daily/Report weight gain of 2 or more pounds in one day or 3 or more pounds in one week, worsening shortness of breath, fatigue, weakness, increased swelling of hands and feet to primary care provider/Low salt diet

## 2017-08-22 NOTE — H&P CARDIOLOGY - HISTORY OF PRESENT ILLNESS
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 Pt is AOx2 and denies cp, sob, dizziness, headache. However, pt and aid are poor historians. Pt at baseline ambulates with walker. No falls was reported and she has no headaches. 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 Pt is AOx2 and denies cp, sob, dizziness, headache. However, pt and aid are poor historians. Pt at baseline ambulates with walker. No falls was reported and she has no headaches. noted to have mildly elevated trop .070 now transferred to [facility] for cardiac cath.

## 2017-08-22 NOTE — PROGRESS NOTE ADULT - SUBJECTIVE AND OBJECTIVE BOX
Patient is a 82y old  Female who presents with a chief complaint of Difficulty walking. (22 Aug 2017 07:06)      INTERVAL HPI/OVERNIGHT EVENTS:    no new symptoms  E Coli UTI rx with rocephin  cardiology follow up noted    MEDICATIONS  (STANDING):  aspirin enteric coated 325 milliGRAM(s) Oral daily  valsartan 160 milliGRAM(s) Oral daily  cloNIDine 0.1 milliGRAM(s) Oral two times a day  amiodarone    Tablet 200 milliGRAM(s) Oral daily  diltiazem    milliGRAM(s) Oral daily  PARoxetine 20 milliGRAM(s) Oral daily  furosemide    Tablet 40 milliGRAM(s) Oral daily  pantoprazole    Tablet 40 milliGRAM(s) Oral before breakfast  cefTRIAXone   IVPB 1 Gram(s) IV Intermittent every 24 hours  azithromycin  IVPB 500 milliGRAM(s) IV Intermittent every 24 hours  ALBUTerol    0.083% 2.5 milliGRAM(s) Nebulizer four times a day  acetylcysteine  Oral Solution 600 milliGRAM(s) Oral two times a day    MEDICATIONS  (PRN):        REVIEW OF SYSTEMS:  CONSTITUTIONAL: No fever, weight loss, or fatigue  EYES: No eye pain, visual disturbances, or discharge  ENMT:  No difficulty hearing, tinnitus, vertigo; No sinus or throat pain  NECK: No pain or stiffness  RESPIRATORY: No cough, wheezing, chills or hemoptysis; No shortness of breath  CARDIOVASCULAR: No chest pain, palpitations, dizziness, or leg swelling  GASTROINTESTINAL: No abdominal or epigastric pain. No nausea, vomiting, or hematemesis; No diarrhea or constipation. No melena or hematochezia.  GENITOURINARY: No dysuria, frequency, hematuria, or incontinence  NEUROLOGICAL: No headaches, memory loss, loss of strength, numbness, or tremors  SKIN: No itching, burning, rashes, or lesions      Vital Signs Last 24 Hrs  T(C): 36.3 (22 Aug 2017 05:38), Max: 36.6 (21 Aug 2017 12:32)  T(F): 97.4 (22 Aug 2017 05:38), Max: 97.8 (21 Aug 2017 12:32)  HR: 57 (22 Aug 2017 06:06) (57 - 70)  BP: 107/42 (22 Aug 2017 05:38) (107/42 - 127/50)  BP(mean): --  RR: 18 (22 Aug 2017 05:38) (16 - 18)  SpO2: 98% (22 Aug 2017 06:06) (96% - 99%)    PHYSICAL EXAM:  GENERAL: NAD, well-groomed, well-developed  HEAD:  Atraumatic, Normocephalic  EYES: EOMI, PERRLA, conjunctiva and sclera clear  ENMT: No tonsillar erythema, exudates, or enlargement; Moist mucous membranes   NECK: Supple, No JVD   NERVOUS SYSTEM:  Alert & Oriented X3, Good concentration; Motor Strength 5/5 B/L upper and lower extremities; DTRs 2+ intact and symmetric  CHEST/LUNG: Clear to percussion bilaterally; No rales, rhonchi, wheezing, or rubs  HEART: Regular rate and rhythm; No murmurs, rubs, or gallops  ABDOMEN: Soft, Nontender, Nondistended; Bowel sounds present  EXTREMITIES:  2+ Peripheral Pulses, No clubbing, cyanosis, or edema  LYMPH: No lymphadenopathy noted  SKIN: No rashes or lesions    LABS:                        9.1    4.6   )-----------( 177      ( 21 Aug 2017 10:52 )             26.8     08-21    132<L>  |  97  |  29<H>  ----------------------------<  225<H>  4.1   |  24  |  2.21<H>    Ca    8.0<L>      21 Aug 2017 10:52          CAPILLARY BLOOD GLUCOSE          RADIOLOGY & ADDITIONAL TESTS:    Imaging Personally Reviewed:  [ ] YES  [ ] NO    Consultant(s) Notes Reviewed:  [ ] YES  [ ] NO    Care Discussed with Consultants/Other Providers [ ] YES  [ ] NO

## 2017-08-22 NOTE — PROGRESS NOTE ADULT - PROBLEM SELECTOR PROBLEM 2
Pneumonia of right lower lobe due to other aerobic gram-negative bacteria
CAD (coronary artery disease)
Pneumonia of right lower lobe due to other aerobic gram-negative bacteria

## 2017-08-22 NOTE — PROGRESS NOTE ADULT - PROBLEM SELECTOR PROBLEM 3
Congestive heart failure, unspecified congestive heart failure chronicity, unspecified congestive heart failure type
CHF (congestive heart failure)
Congestive heart failure, unspecified congestive heart failure chronicity, unspecified congestive heart failure type

## 2017-08-22 NOTE — CHART NOTE - NSCHARTNOTEFT_GEN_A_CORE
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 Patient was found to have pneumonia and was treated with Azithromycin and Ceftriaxone. She was also found to have congestive heart failure and was diuresed with Lasix 40 mg daily. Patient was noted to have mildly elevated trop .070 now transferred to [facility] for post cath management. Patient is examined at the bedside postcath. She had R femoral access for LHC, no hematoma at the site. She had 2 CRISTIAN to L main and proximal circumflex. She has no chest pain, no shortness of breath and no palpitations.     PAST MEDICAL & SURGICAL HISTORY:  CVA (cerebral infarction)  Anxiety  HLD (hyperlipidemia)  HTN (hypertension)  Hypertension  Hyperlipidemia  Bilateral cataracts  Osteoarthritis  Myelodysplastic syndrome: diagnosed 5 years ago  H/O spinal stenosis  Aortic stenosis: severe s/p cardiac cath 9/21/12  Asthma  Myeloblastic anemia  Moe esophagus  H/O heart artery stent  CAD (coronary artery disease)  S/P total knee replacement: bilateral in 2009  H/O breast biopsy: left , years ago  S/P dilatation and curettage: years ago  CAD S/P percutaneous coronary angioplasty: 1 stent placed 2009  one stent placed 2010  rectal prolapse repaired: 20+ years ago              FAMILY HISTORY:  Family history of diabetes mellitus (Child): daughter      SOCIAL HISTORY:  Tobacco use:  Alcohol:  Other drug use:      REVIEW OF SYSTEMS:  Constitutional: [ x] negative [ ] fevers [ ] chills [ ] weight loss [ ] weight gain  HEENT: [x ] negative [ ] dry eyes [ ] eye irritation [ ] postnasal drip [ ] nasal congestion  CV: [ x] negative  [ ] chest pain [ ] orthopnea [ ] palpitations [ ] murmur  Resp: [ x] negative [ ] cough [ ] shortness of breath [ ] dyspnea [ ] wheezing [ ] sputum [ ] hemoptysis  GI: [ x] negative [ ] nausea [ ] vomiting [ ] diarrhea [ ] constipation [ ] abd pain [ ] dysphagia   : [x ] negative [ ] dysuria [ ] nocturia [ ] hematuria [ ] increased urinary frequency  Musculoskeletal: [x ] negative [ ] back pain [ ] myalgias [ ] arthralgias [ ] fracture  Skin: [ x] negative [ ] rash [ ] itch  Neurological: [ x] negative [ ] headache [ ] dizziness [ ] syncope [ ] weakness [ ] numbness  Psychiatric: [ x] negative [ ] anxiety [ ] depression  Endocrine: [x ] negative [ ] diabetes [ ] thyroid problem  Hematologic/Lymphatic: [ x] negative [ ] anemia [ ] bleeding problem  Allergic/Immunologic: [x ] negative [ ] itchy eyes [ ] nasal discharge [ ] hives [ ] angioedema  [x ] All other systems negative  [ ] Unable to assess ROS because ________      Home Medications:     MEDICATIONS  (STANDING):  aspirin  chewable 81 milliGRAM(s) Oral daily  clopidogrel Tablet 75 milliGRAM(s) Oral daily  atorvastatin 80 milliGRAM(s) Oral at bedtime    MEDICATIONS  (PRN):        OBJECTIVE:     Vital Signs Last 24 Hrs  T(C): 36.8 (22 Aug 2017 19:15), Max: 37 (22 Aug 2017 12:25)  T(F): 98.3 (22 Aug 2017 19:15), Max: 98.6 (22 Aug 2017 12:25)  HR: 60 (22 Aug 2017 21:17) (57 - 67)  BP: 140/54 (22 Aug 2017 21:17) (107/42 - 172/70)  BP(mean): 86 (22 Aug 2017 21:17) (71 - 117)  RR: 20 (22 Aug 2017 21:17) (14 - 22)  SpO2: 97% (22 Aug 2017 21:17) (93% - 99%)    PHYSICAL EXAM:  General: Alert and cooperative. Not in acute stress. Well developed, well nourished.   Head: Normocephalic, no mass and lesions.   Eyes: Intact visual fields. PERRLA, clear conjunctiva. EOMI, no ptosis.   Neck: No lymphadenopathy, no masses, no thyromegaly. Carotid pulses 2+. No JVD.   Respiratory: Bilateral lung clear to auscultation, no crackles, no wheezes, no rhonchi.   Cardiovascular: S1/S2 auscultated, holosystolic murmur best appreciated the R 2nd intercostal space, no gallop. Rhythm is regular. There is no peripheral edema, cyanosis or pallor. Extremities are warm and well perfused. Capillary refill is less than 2 seconds. No carotid bruits.  Abdomen: Soft, non-tender, nondistended, no guarding or rebound tenderness. Active bowel sounds in all 4 quadrants. No hepatosplenomegaly.    Extremities: Peripheral pulses intact. No varicosities. No peripheral edema, atrophy.   Skin: Intact, no rash. Normal color, texture and turgor with no lesions or eruptions.  Neurological: AOAx2. CN2-12 grosslly intact. RUE is contracted, diminished strength. Bilateral LE diminished strength.         I&O's Detail      LABS:                        10.1   4.5   )-----------( 210      ( 22 Aug 2017 21:02 )             30.4     Hgb Trend: 10.1<--, 9.1<--, 8.6<--, 9.6<--  08-21    132<L>  |  97  |  29<H>  ----------------------------<  225<H>  4.1   |  24  |  2.21<H>    Ca    8.0<L>      21 Aug 2017 10:52      Creatinine Trend: 2.21<--, 2.15<--, 2.25<--  PTT - ( 22 Aug 2017 21:02 )  PTT:71.9 sec      [facility] 8/22  CRISTIAN 90 % lesion in the left main. Following intervention there was a 1 %  CRISTIAN 90 % lesion in the proximal circumflex  Balloon angioplasty was performed  LM:   --  LM: There was a 90 % stenosis.  --  Distal left main: There was a 90 % stenosis.  LAD:   --  Ostial LAD: There was a 90 % stenosis.  CX:   --  Ostial circumflex: There was a 90 % stenosis.  --  Proximal circumflex: There was a 90 % stenosis.  RCA:   --  Distal RCA: There was a 100 % stenosis.    TTE 6/24/2014  1. Severe mitral annular calcification. Mild-moderate mitral regurgitation.  2. Bioprosthetic aortic valve replacement. Peak transaortic valve gradient equals 38 mm Hg, mean transaortic valve gradient equals 22 mm Hg, which is probably normal in the  presence of a prosthetic valve and a hyperdynamic LV. Minimal aortic regurgitation.  3. Severe left atrial enlargement.  4. Hyperdynamic left ventricle.  5. Diastolic dysfunction.  6. Normal right atrium.  7. Normal right ventricular size and function.  8. Estimated right ventricular systolic pressure equals 36 mm Hg, assuming right atrial pressure equals 10 mm Hg, consistent with borderline pulmonary hypertension.  9. Normal tricuspid valve. Mild tricuspid regurgitation.  10. No pericardial effusion seen.    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     Problem 1: NSTEMI  Plan:  - Patient had LHC done 8/22 with 2 CRISTIAN  - Elevated CK and mildly elevated Trop  - Patient on ASA, Plavix, Atorvastatin  - Continue to trend CE  - TTE ordered    Problem 2: NANDO on CKD  - Baseline Cr unknown  - Continue to monitor UOP and Cr    Problem 3: CAP  - Received Azithromycin (total of 5 days) and Ceftriaxone (total of 3 days) from OSH  - CXR on 8/18 shows LLL infiltrate  - No longer febrile, no leukocytosis, hemodynamically stable  - Continue with Azithromyin for a total of 7 days    Problem 4: UTI  - Positive urine culture on 8/18, sensitive to Ceftriaxone  - Patient received 3 days of Ceftriaxone  - Will repeat UA and Urine culture    - HPI:  Patient is a 82 year old female with history of aortic stenosis s/p AVR, CAD s/p CABG and stents, HTN, HLD, multiple CVA with b/l weakness (Rt > Lt) presented initially to [facility] with LE edema and pain with difficulty ambulating for 1 week duration. Patient was found to have pneumonia and was treated with Azithromycin and Ceftriaxone. She was also found to have congestive heart failure and was diuresed with Lasix 40 mg daily. Patient was noted to have mildly elevated trop .070 concerning for NSTEMI, now transferred to [facility] for LHC. Patient is examined at the bedside postcath. She had R femoral access for LHC, no hematoma at the site. She had 2 CRISTIAN to L main and proximal circumflex. She has no chest pain, no shortness of breath and no palpitations.       PAST MEDICAL & SURGICAL HISTORY:  CVA (cerebral infarction)  Anxiety  HLD (hyperlipidemia)  HTN (hypertension)  Hypertension  Hyperlipidemia  Bilateral cataracts  Osteoarthritis  Myelodysplastic syndrome: diagnosed 5 years ago  H/O spinal stenosis  Aortic stenosis: severe s/p cardiac cath 9/21/12  Asthma  Myeloblastic anemia  Moe esophagus  H/O heart artery stent  CAD (coronary artery disease)  S/P total knee replacement: bilateral in 2009  H/O breast biopsy: left , years ago  S/P dilatation and curettage: years ago  CAD S/P percutaneous coronary angioplasty: 1 stent placed 2009  one stent placed 2010  rectal prolapse repaired: 20+ years ago              FAMILY HISTORY:  Family history of diabetes mellitus (Child): daughter        SOCIAL HISTORY:  Tobacco use: none  Alcohol: none  Other drug use: none        REVIEW OF SYSTEMS:  Constitutional: [ x] negative [ ] fevers [ ] chills [ ] weight loss [ ] weight gain  HEENT: [x ] negative [ ] dry eyes [ ] eye irritation [ ] postnasal drip [ ] nasal congestion  CV: [ x] negative  [ ] chest pain [ ] orthopnea [ ] palpitations [ ] murmur  Resp: [ x] negative [ ] cough [ ] shortness of breath [ ] dyspnea [ ] wheezing [ ] sputum [ ] hemoptysis  GI: [ x] negative [ ] nausea [ ] vomiting [ ] diarrhea [ ] constipation [ ] abd pain [ ] dysphagia   : [x ] negative [ ] dysuria [ ] nocturia [ ] hematuria [ ] increased urinary frequency  Musculoskeletal: [x ] negative [ ] back pain [ ] myalgias [ ] arthralgias [ ] fracture  Skin: [ x] negative [ ] rash [ ] itch  Neurological: [ x] negative [ ] headache [ ] dizziness [ ] syncope [ ] weakness [ ] numbness  Psychiatric: [ x] negative [ ] anxiety [ ] depression  Endocrine: [x ] negative [ ] diabetes [ ] thyroid problem  Hematologic/Lymphatic: [ x] negative [ ] anemia [ ] bleeding problem  Allergic/Immunologic: [x ] negative [ ] itchy eyes [ ] nasal discharge [ ] hives [ ] angioedema  [x ] All other systems negative  [ ] Unable to assess ROS because ________      Home Medications:     MEDICATIONS  (STANDING):  aspirin  chewable 81 milliGRAM(s) Oral daily  clopidogrel Tablet 75 milliGRAM(s) Oral daily  atorvastatin 80 milliGRAM(s) Oral at bedtime    MEDICATIONS  (PRN):        OBJECTIVE:     Vital Signs Last 24 Hrs  T(C): 36.8 (22 Aug 2017 19:15), Max: 37 (22 Aug 2017 12:25)  T(F): 98.3 (22 Aug 2017 19:15), Max: 98.6 (22 Aug 2017 12:25)  HR: 60 (22 Aug 2017 21:17) (57 - 67)  BP: 140/54 (22 Aug 2017 21:17) (107/42 - 172/70)  BP(mean): 86 (22 Aug 2017 21:17) (71 - 117)  RR: 20 (22 Aug 2017 21:17) (14 - 22)  SpO2: 97% (22 Aug 2017 21:17) (93% - 99%)    PHYSICAL EXAM:  General: Alert and cooperative. Not in acute stress. Well developed, well nourished.   Head: Normocephalic, no mass and lesions.   Eyes: Intact visual fields. PERRLA, clear conjunctiva. EOMI, no ptosis.   Neck: No lymphadenopathy, no masses Carotid pulses 2+. No JVD.   Respiratory: Bilateral lung clear to auscultation, no crackles, no wheezes, no rhonchi.   Cardiovascular: S1/S2 auscultated, holosystolic murmur best appreciated the R 2nd intercostal space, no gallop. Rhythm is regular. There is no peripheral edema, cyanosis or pallor. Extremities are warm and well perfused. Capillary refill is less than 2 seconds. No carotid bruits.  Abdomen: Soft, non-tender, nondistended, no guarding or rebound tenderness. Active bowel sounds in all 4 quadrants. No hepatosplenomegaly.    Extremities: Peripheral pulses intact. No varicosities. No peripheral edema, atrophy.   Skin: Intact, no rash. Normal color, texture and turgor with no lesions or eruptions.  Neurological: AOAx2. CN2-12 grosslly intact. RUE is contracted, diminished strength. Bilateral LE diminished strength.         I&O's Detail      LABS:                        10.1   4.5   )-----------( 210      ( 22 Aug 2017 21:02 )             30.4     Hgb Trend: 10.1<--, 9.1<--, 8.6<--, 9.6<--  08-21    132<L>  |  97  |  29<H>  ----------------------------<  225<H>  4.1   |  24  |  2.21<H>    Ca    8.0<L>      21 Aug 2017 10:52      Creatinine Trend: 2.21<--, 2.15<--, 2.25<--  PTT - ( 22 Aug 2017 21:02 )  PTT:71.9 sec      [facility] 8/22  CRISTIAN 90 % lesion in the left main. Following intervention there was a 1 %  CRISTIAN 90 % lesion in the proximal circumflex  Balloon angioplasty was performed  LM:   --  LM: There was a 90 % stenosis.  --  Distal left main: There was a 90 % stenosis.  LAD:   --  Ostial LAD: There was a 90 % stenosis.  CX:   --  Ostial circumflex: There was a 90 % stenosis.  --  Proximal circumflex: There was a 90 % stenosis.  RCA:   --  Distal RCA: There was a 100 % stenosis.    TTE 6/24/2014  1. Severe mitral annular calcification. Mild-moderate mitral regurgitation.  2. Bioprosthetic aortic valve replacement. Peak transaortic valve gradient equals 38 mm Hg, mean transaortic valve gradient equals 22 mm Hg, which is probably normal in the  presence of a prosthetic valve and a hyperdynamic LV. Minimal aortic regurgitation.  3. Severe left atrial enlargement.  4. Hyperdynamic left ventricle.  5. Diastolic dysfunction.  6. Normal right atrium.  7. Normal right ventricular size and function.  8. Estimated right ventricular systolic pressure equals 36 mm Hg, assuming right atrial pressure equals 10 mm Hg, consistent with borderline pulmonary hypertension.  9. Normal tricuspid valve. Mild tricuspid regurgitation.  10. No pericardial effusion seen.    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       Problem 1: NSTEMI    Plan:  - Patient had LHC done 8/22 with 2 CRISTIAN  - Elevated CK and mildly elevated Trop  - Patient on ASA, Plavix, Atorvastatin  - Continue to trend CE  - TTE ordered    Problem 2: NANDO on CKD  Plan:  - Baseline Cr 1.18 in 2.14  - Continue to monitor UOP and Cr    Problem 3: CAP  Plan:  - Received Azithromycin (total of 5 days) and Ceftriaxone (total of 3 days) from OSH  - CXR on 8/18 shows LLL infiltrate  - No longer febrile, no leukocytosis, hemodynamically stable  - Continue with Azithromyin and Ceftriaxone for CAP a total of 7 days      Problem 4: UTI  Plan:  - Positive urine culture on 8/18, sensitive to Ceftriaxone  - Patient received 3 days of Ceftriaxone  - Will repeat UA and Urine culture

## 2017-08-23 DIAGNOSIS — Z29.9 ENCOUNTER FOR PROPHYLACTIC MEASURES, UNSPECIFIED: ICD-10-CM

## 2017-08-23 DIAGNOSIS — I10 ESSENTIAL (PRIMARY) HYPERTENSION: ICD-10-CM

## 2017-08-23 DIAGNOSIS — J18.9 PNEUMONIA, UNSPECIFIED ORGANISM: ICD-10-CM

## 2017-08-23 DIAGNOSIS — I25.10 ATHEROSCLEROTIC HEART DISEASE OF NATIVE CORONARY ARTERY WITHOUT ANGINA PECTORIS: ICD-10-CM

## 2017-08-23 DIAGNOSIS — N39.0 URINARY TRACT INFECTION, SITE NOT SPECIFIED: ICD-10-CM

## 2017-08-23 DIAGNOSIS — N17.9 ACUTE KIDNEY FAILURE, UNSPECIFIED: ICD-10-CM

## 2017-08-23 LAB
ALBUMIN SERPL ELPH-MCNC: 3 G/DL — LOW (ref 3.3–5)
ALP SERPL-CCNC: 101 U/L — SIGNIFICANT CHANGE UP (ref 40–120)
ALT FLD-CCNC: 27 U/L RC — SIGNIFICANT CHANGE UP (ref 10–45)
ANION GAP SERPL CALC-SCNC: 12 MMOL/L — SIGNIFICANT CHANGE UP (ref 5–17)
AST SERPL-CCNC: 25 U/L — SIGNIFICANT CHANGE UP (ref 10–40)
BASOPHILS # BLD AUTO: 0.1 K/UL — SIGNIFICANT CHANGE UP (ref 0–0.2)
BASOPHILS NFR BLD AUTO: 1.3 % — SIGNIFICANT CHANGE UP (ref 0–2)
BILIRUB SERPL-MCNC: 0.2 MG/DL — SIGNIFICANT CHANGE UP (ref 0.2–1.2)
BUN SERPL-MCNC: 36 MG/DL — HIGH (ref 7–23)
CALCIUM SERPL-MCNC: 8.4 MG/DL — SIGNIFICANT CHANGE UP (ref 8.4–10.5)
CHLORIDE SERPL-SCNC: 95 MMOL/L — LOW (ref 96–108)
CHOLEST SERPL-MCNC: 197 MG/DL — SIGNIFICANT CHANGE UP (ref 10–199)
CK MB BLD-MCNC: 1.3 % — SIGNIFICANT CHANGE UP (ref 0–3.5)
CK MB CFR SERPL CALC: 2.1 NG/ML — SIGNIFICANT CHANGE UP (ref 0–3.8)
CK SERPL-CCNC: 157 U/L — SIGNIFICANT CHANGE UP (ref 25–170)
CO2 SERPL-SCNC: 25 MMOL/L — SIGNIFICANT CHANGE UP (ref 22–31)
CREAT SERPL-MCNC: 2.21 MG/DL — HIGH (ref 0.5–1.3)
EOSINOPHIL # BLD AUTO: 0.2 K/UL — SIGNIFICANT CHANGE UP (ref 0–0.5)
EOSINOPHIL NFR BLD AUTO: 4.4 % — SIGNIFICANT CHANGE UP (ref 0–6)
GLUCOSE SERPL-MCNC: 96 MG/DL — SIGNIFICANT CHANGE UP (ref 70–99)
HBA1C BLD-MCNC: 6 % — HIGH (ref 4–5.6)
HCT VFR BLD CALC: 27.2 % — LOW (ref 34.5–45)
HDLC SERPL-MCNC: 74 MG/DL — SIGNIFICANT CHANGE UP (ref 40–125)
HGB BLD-MCNC: 9.1 G/DL — LOW (ref 11.5–15.5)
LIPID PNL WITH DIRECT LDL SERPL: 115 MG/DL — SIGNIFICANT CHANGE UP
LYMPHOCYTES # BLD AUTO: 1.2 K/UL — SIGNIFICANT CHANGE UP (ref 1–3.3)
LYMPHOCYTES # BLD AUTO: 28.1 % — SIGNIFICANT CHANGE UP (ref 13–44)
MAGNESIUM SERPL-MCNC: 2.2 MG/DL — SIGNIFICANT CHANGE UP (ref 1.6–2.6)
MCHC RBC-ENTMCNC: 30.1 PG — SIGNIFICANT CHANGE UP (ref 27–34)
MCHC RBC-ENTMCNC: 33.6 GM/DL — SIGNIFICANT CHANGE UP (ref 32–36)
MCV RBC AUTO: 89.6 FL — SIGNIFICANT CHANGE UP (ref 80–100)
MONOCYTES # BLD AUTO: 0.5 K/UL — SIGNIFICANT CHANGE UP (ref 0–0.9)
MONOCYTES NFR BLD AUTO: 13 % — SIGNIFICANT CHANGE UP (ref 2–14)
NEUTROPHILS # BLD AUTO: 2.2 K/UL — SIGNIFICANT CHANGE UP (ref 1.8–7.4)
NEUTROPHILS NFR BLD AUTO: 53.1 % — SIGNIFICANT CHANGE UP (ref 43–77)
PHOSPHATE SERPL-MCNC: 4.4 MG/DL — SIGNIFICANT CHANGE UP (ref 2.5–4.5)
PLATELET # BLD AUTO: 173 K/UL — SIGNIFICANT CHANGE UP (ref 150–400)
POTASSIUM SERPL-MCNC: 4.4 MMOL/L — SIGNIFICANT CHANGE UP (ref 3.5–5.3)
POTASSIUM SERPL-SCNC: 4.4 MMOL/L — SIGNIFICANT CHANGE UP (ref 3.5–5.3)
PROT SERPL-MCNC: 6.2 G/DL — SIGNIFICANT CHANGE UP (ref 6–8.3)
RBC # BLD: 3.03 M/UL — LOW (ref 3.8–5.2)
RBC # FLD: 13.7 % — SIGNIFICANT CHANGE UP (ref 10.3–14.5)
SODIUM SERPL-SCNC: 132 MMOL/L — LOW (ref 135–145)
TOTAL CHOLESTEROL/HDL RATIO MEASUREMENT: 2.7 RATIO — LOW (ref 3.3–7.1)
TRIGL SERPL-MCNC: 39 MG/DL — SIGNIFICANT CHANGE UP (ref 10–149)
TROPONIN T SERPL-MCNC: 0.02 NG/ML — SIGNIFICANT CHANGE UP (ref 0–0.06)
TSH SERPL-MCNC: 2.22 UIU/ML — SIGNIFICANT CHANGE UP (ref 0.27–4.2)
WBC # BLD: 4.1 K/UL — SIGNIFICANT CHANGE UP (ref 3.8–10.5)
WBC # FLD AUTO: 4.1 K/UL — SIGNIFICANT CHANGE UP (ref 3.8–10.5)

## 2017-08-23 PROCEDURE — 93306 TTE W/DOPPLER COMPLETE: CPT | Mod: 26

## 2017-08-23 PROCEDURE — 99233 SBSQ HOSP IP/OBS HIGH 50: CPT

## 2017-08-23 PROCEDURE — 93010 ELECTROCARDIOGRAM REPORT: CPT

## 2017-08-23 RX ORDER — ACETAMINOPHEN 500 MG
1000 TABLET ORAL ONCE
Qty: 0 | Refills: 0 | Status: COMPLETED | OUTPATIENT
Start: 2017-08-23 | End: 2017-08-23

## 2017-08-23 RX ORDER — ASPIRIN/CALCIUM CARB/MAGNESIUM 324 MG
81 TABLET ORAL DAILY
Qty: 0 | Refills: 0 | Status: DISCONTINUED | OUTPATIENT
Start: 2017-08-23 | End: 2017-08-29

## 2017-08-23 RX ORDER — HEPARIN SODIUM 5000 [USP'U]/ML
5000 INJECTION INTRAVENOUS; SUBCUTANEOUS EVERY 12 HOURS
Qty: 0 | Refills: 0 | Status: DISCONTINUED | OUTPATIENT
Start: 2017-08-23 | End: 2017-08-29

## 2017-08-23 RX ADMIN — Medication 1000 MILLIGRAM(S): at 23:30

## 2017-08-23 RX ADMIN — Medication 81 MILLIGRAM(S): at 11:53

## 2017-08-23 RX ADMIN — Medication 0.1 MILLIGRAM(S): at 00:00

## 2017-08-23 RX ADMIN — ATORVASTATIN CALCIUM 80 MILLIGRAM(S): 80 TABLET, FILM COATED ORAL at 00:00

## 2017-08-23 RX ADMIN — Medication 20 MILLIGRAM(S): at 11:53

## 2017-08-23 RX ADMIN — CLOPIDOGREL BISULFATE 75 MILLIGRAM(S): 75 TABLET, FILM COATED ORAL at 11:53

## 2017-08-23 RX ADMIN — Medication 40 MILLIGRAM(S): at 06:25

## 2017-08-23 RX ADMIN — ATORVASTATIN CALCIUM 80 MILLIGRAM(S): 80 TABLET, FILM COATED ORAL at 21:00

## 2017-08-23 RX ADMIN — AZITHROMYCIN 250 MILLIGRAM(S): 500 TABLET, FILM COATED ORAL at 00:02

## 2017-08-23 RX ADMIN — PANTOPRAZOLE SODIUM 40 MILLIGRAM(S): 20 TABLET, DELAYED RELEASE ORAL at 06:25

## 2017-08-23 RX ADMIN — AMIODARONE HYDROCHLORIDE 200 MILLIGRAM(S): 400 TABLET ORAL at 06:25

## 2017-08-23 RX ADMIN — Medication 0.1 MILLIGRAM(S): at 06:25

## 2017-08-23 RX ADMIN — AZITHROMYCIN 250 MILLIGRAM(S): 500 TABLET, FILM COATED ORAL at 21:12

## 2017-08-23 RX ADMIN — Medication 400 MILLIGRAM(S): at 22:43

## 2017-08-23 RX ADMIN — CEFTRIAXONE 100 GRAM(S): 500 INJECTION, POWDER, FOR SOLUTION INTRAMUSCULAR; INTRAVENOUS at 20:59

## 2017-08-23 RX ADMIN — HEPARIN SODIUM 5000 UNIT(S): 5000 INJECTION INTRAVENOUS; SUBCUTANEOUS at 18:03

## 2017-08-23 NOTE — PROGRESS NOTE ADULT - PROBLEM SELECTOR PLAN 1
s/p PCI  c/w ASA, Plavix, Lipitor  monitor groin site  Increase activity  Holding BB 2/2 bradycardia / soft P  Holding ACEI 2/2 NANDO s/p PCI  c/w ASA, Plavix, Lipitor  monitor groin site  Increase activity  Holding BB 2/2 bradycardia / soft BP  Holding ACEI 2/2 NANDO

## 2017-08-23 NOTE — PHYSICAL THERAPY INITIAL EVALUATION ADULT - IMPAIRMENTS FOUND, PT EVAL
gait, locomotion, and balance aerobic capacity/endurance/ROM/muscle strength/anthropometric characteristics/gait, locomotion, and balance

## 2017-08-23 NOTE — PROGRESS NOTE ADULT - PROBLEM SELECTOR PLAN 5
Positive urine culture on 8/18, sensitive to Ceftriaxone Afebrile, n eukocytosis  Positive urine culture on 8/18, sensitive to Ceftriaxone  c/w antibiotic Afebrile, no leukocytosis  Positive urine culture on 8/18, sensitive to Ceftriaxone  c/w antibiotic

## 2017-08-23 NOTE — CHART NOTE - NSCHARTNOTEFT_GEN_A_CORE
NITHYA SANDRA  [id number]  ===========================================  HPI/CCU Course/Interval History:    Patient is a 82 year old female with history of aortic stenosis s/p AVR, CAD s/p CABG and stents, HTN, HLD, CKD, multiple CVA with b/l weakness (Rt > Lt) presented initially to [facility] with LE edema and pain with difficulty ambulating for 1 week duration. Patient was found to have pneumonia and was treated with Azithromycin and Ceftriaxone. She was also found to have congestive heart failure and was diuresed with Lasix 40 mg daily. Patient was noted to have mildly elevated trop .070 concerning for NSTEMI, now transferred to [facility] for LHC. She had R femoral access for LHC, no hematoma at the site. She had 2 CRISTIAN to L main and proximal circumflex. She has no chest pain, no shortness of breath and no palpitations. Patient had some episodes of asymptomatic sinus bradycardia on Tele post-intervention.      ===========================================  Vital Signs Last 24 Hrs  T(C): 37.1 (23 Aug 2017 20:00), Max: 37.1 (23 Aug 2017 20:00)  T(F): 98.7 (23 Aug 2017 20:00), Max: 98.7 (23 Aug 2017 20:00)  HR: 58 (23 Aug 2017 22:00) (48 - 58)  BP: 96/45 (23 Aug 2017 22:00) (85/46 - 128/52)  BP(mean): 71 (23 Aug 2017 22:00) (59 - 88)  RR: 21 (23 Aug 2017 22:00) (11 - 25)  SpO2: 100% (23 Aug 2017 22:00) (96% - 100%)    ICU Vital Signs Last 24 Hrs  T(C): 37.1 (23 Aug 2017 20:00), Max: 37.1 (23 Aug 2017 20:00)  T(F): 98.7 (23 Aug 2017 20:00), Max: 98.7 (23 Aug 2017 20:00)  HR: 58 (23 Aug 2017 22:00) (48 - 58)  BP: 96/45 (23 Aug 2017 22:00) (85/46 - 128/52)  BP(mean): 71 (23 Aug 2017 22:00) (59 - 88)  ABP: --  ABP(mean): --  RR: 21 (23 Aug 2017 22:00) (11 - 25)  SpO2: 100% (23 Aug 2017 22:00) (96% - 100%)    ============================================  TELEMETRY  ============================================  MEDICATIONS  (STANDING):  clopidogrel Tablet 75 milliGRAM(s) Oral daily  atorvastatin 80 milliGRAM(s) Oral at bedtime  amiodarone    Tablet 200 milliGRAM(s) Oral daily  PARoxetine 20 milliGRAM(s) Oral daily  furosemide    Tablet 40 milliGRAM(s) Oral daily  pantoprazole    Tablet 40 milliGRAM(s) Oral before breakfast  cefTRIAXone   IVPB   IV Intermittent   azithromycin  IVPB   IV Intermittent   cefTRIAXone   IVPB 1 Gram(s) IV Intermittent every 24 hours  azithromycin  IVPB 500 milliGRAM(s) IV Intermittent every 24 hours  aspirin enteric coated 81 milliGRAM(s) Oral daily  heparin  Injectable 5000 Unit(s) SubCutaneous every 12 hours    ============================================    =============================================  #NSTEMI  Plan:  - Patient had LHC done 8/22 with 2 CRISTIAN (Left Main and Cx)  - unremarkable CE  - Patient on ASA, Plavix, Atorvastatin. holding ACEI and Beta-blocker, add as tolerated (BP, HR, Cr)  - TTE shows EF = 84%, Moderate-severe mitral regurgitation, severely dilated left atrium, Hyperdynamic left ventricle, normal right ventricular size and function.    #NANDO on CKD  Plan:  - Cr stabilizing, Baseline Cr ~1.7 (1/2017)  - Continue to monitor UOP and Cr    #CAP  Plan:  - c/w Azithromycin and Ceftriaxone (day 4)  - CXR on 8/18 shows LLL infiltrate  - No longer febrile, no leukocytosis, hemodynamically stable  - Continue with Azithromyin and Ceftriaxone for CAP a total of 7 days    #Hyponatremia  - unclear etiology, check Serum/Urine Osm. possibly diuretic-induced vs SIADH 2/2 PNA    #UTI  Plan:  - Positive urine culture on 8/18, sensitive to Ceftriaxone  - Patient received 3 days of Ceftriaxone    #Dispo  Plan:  - PT rec'd rehab, ambulate as tolerated NITHYA SANDRA  [id number]  ===========================================  HPI/CCU Course/Interval History:    Patient is a 82 year old female with history of aortic stenosis s/p AVR, CAD s/p CABG and stents, HTN, HLD, CKD, multiple CVA with b/l weakness (Rt > Lt) presented initially to [facility] with LE edema and pain with difficulty ambulating for 1 week duration. Patient was found to have pneumonia and was treated with Azithromycin and Ceftriaxone. She was also found to have congestive heart failure and was diuresed with Lasix 40 mg daily. Patient was noted to have mildly elevated trop .070 concerning for NSTEMI, now transferred to [facility] for LHC. She had R femoral access for LHC, no hematoma at the site. She had 2 CRISTIAN to L main and proximal circumflex. She has no chest pain, no shortness of breath and no palpitations. Patient had some episodes of asymptomatic sinus bradycardia on Tele post-intervention.      ===========================================  Vital Signs Last 24 Hrs  T(C): 37.1 (23 Aug 2017 20:00), Max: 37.1 (23 Aug 2017 20:00)  T(F): 98.7 (23 Aug 2017 20:00), Max: 98.7 (23 Aug 2017 20:00)  HR: 58 (23 Aug 2017 22:00) (48 - 58)  BP: 96/45 (23 Aug 2017 22:00) (85/46 - 128/52)  BP(mean): 71 (23 Aug 2017 22:00) (59 - 88)  RR: 21 (23 Aug 2017 22:00) (11 - 25)  SpO2: 100% (23 Aug 2017 22:00) (96% - 100%)    ICU Vital Signs Last 24 Hrs  T(C): 37.1 (23 Aug 2017 20:00), Max: 37.1 (23 Aug 2017 20:00)  T(F): 98.7 (23 Aug 2017 20:00), Max: 98.7 (23 Aug 2017 20:00)  HR: 58 (23 Aug 2017 22:00) (48 - 58)  BP: 96/45 (23 Aug 2017 22:00) (85/46 - 128/52)  BP(mean): 71 (23 Aug 2017 22:00) (59 - 88)  ABP: --  ABP(mean): --  RR: 21 (23 Aug 2017 22:00) (11 - 25)  SpO2: 100% (23 Aug 2017 22:00) (96% - 100%)    ============================================  TELEMETRY  ============================================  MEDICATIONS  (STANDING):  clopidogrel Tablet 75 milliGRAM(s) Oral daily  atorvastatin 80 milliGRAM(s) Oral at bedtime  amiodarone    Tablet 200 milliGRAM(s) Oral daily  PARoxetine 20 milliGRAM(s) Oral daily  furosemide    Tablet 40 milliGRAM(s) Oral daily  pantoprazole    Tablet 40 milliGRAM(s) Oral before breakfast  cefTRIAXone   IVPB   IV Intermittent   azithromycin  IVPB   IV Intermittent   cefTRIAXone   IVPB 1 Gram(s) IV Intermittent every 24 hours  azithromycin  IVPB 500 milliGRAM(s) IV Intermittent every 24 hours  aspirin enteric coated 81 milliGRAM(s) Oral daily  heparin  Injectable 5000 Unit(s) SubCutaneous every 12 hours    ============================================    =============================================  #NSTEMI  Plan:  - Patient had LHC done 8/22 with 2 CRISTIAN (Left Main and Cx)  - unremarkable CE  - Patient on ASA, Plavix, Atorvastatin. holding ACEI and Beta-blocker, add as tolerated (BP, HR, Cr)  - TTE shows EF = 84%, Moderate-severe mitral regurgitation, severely dilated left atrium, Hyperdynamic left ventricle, normal right ventricular size and function.    #NANDO on CKD  Plan:  - Cr stabilizing, Baseline Cr ~1.7 (1/2017)  - Continue to monitor UOP and Cr    #CAP  Plan:  - c/w Azithromycin and Ceftriaxone (day 4), t/c discontinuation given clinical stability  - CXR on 8/18 shows LLL infiltrate  - No longer febrile, no leukocytosis, hemodynamically stable    #Hyponatremia  - unclear etiology, check Serum/Urine Osm. possibly diuretic-induced vs SIADH 2/2 PNA    #UTI  Plan:  - Positive urine culture on 8/18, sensitive to Ceftriaxone  - Patient received 3 days of Ceftriaxone    #Dispo  Plan:  - PT rec'd rehab, ambulate as tolerated

## 2017-08-23 NOTE — PROGRESS NOTE ADULT - PROBLEM SELECTOR PLAN 4
C/w antibiotics Received Azithromycin (total of 5 days) and Ceftriaxone (total of 3 days) from OSH  CXR on 8/18 shows LLL infiltrate  No longer febrile, no leukocytosis, hemodynamically stable  Continue with Azithromyin and Ceftriaxone for CAP a total of 7 days Afebrile, no leukocytosis  C/w antibiotics Received Azithromycin (total of 5 days) and Ceftriaxone (total of 3 days) from OSH  CXR on 8/18 shows LLL infiltrate  No longer febrile, no leukocytosis, hemodynamically stable  Continue with Azithromyin and Ceftriaxone for CAP a total of 7 days

## 2017-08-23 NOTE — PHYSICAL THERAPY INITIAL EVALUATION ADULT - CRITERIA FOR SKILLED THERAPEUTIC INTERVENTIONS
impairments found predicted duration of therapy intervention/functional limitations in following categories/therapy frequency/impairments found/anticipated equipment needs at discharge/risk reduction/prevention/anticipated discharge recommendation/rehab potential

## 2017-08-23 NOTE — PHYSICAL THERAPY INITIAL EVALUATION ADULT - ADDITIONAL COMMENTS
Pt. reports resides in home with home health aide, no stairs.  Pt. is slightly confused and is a poor historian

## 2017-08-23 NOTE — PROGRESS NOTE ADULT - ASSESSMENT
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

## 2017-08-23 NOTE — CHART NOTE - NSCHARTNOTEFT_GEN_A_CORE
8/22 2300    Removal of Femoral Sheath    Pulses in the right lower extremity are audible by doppler. The patient was placed in the supine position. The insertion site was identified and the sutures were removed per protocol.  The 7 [place]ian femoral sheath was then removed. Direct pressure was applied for  21 minutes.     Monitoring of the right groin and both lower extremities including neuro-vascular checks and vital signs every 15 minutes x 4, then every 30 minutes x 2, then every 1 hour was ordered.    Complications: None    8/23 0100    Pt groin C/D/I w/o e/o bleeding, bruising, or induration.

## 2017-08-23 NOTE — PROGRESS NOTE ADULT - SUBJECTIVE AND OBJECTIVE BOX
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 Pt is AOx2 and denies cp, sob, dizziness, headache. However, pt and aid are poor historians. Pt at baseline ambulates with walker. No falls was reported and she has no headaches. noted to have mildly elevated trop .070 now transferred to [facility] for cardiac cath. (22 Aug 2017 12:25)    INTERVAL HISTORY: Sinus bradycardia overnight    REVIEW OF SYSTEMS: Denies xxxxxx; all others negative    MEDICATIONS  (STANDING):  clopidogrel Tablet 75 milliGRAM(s) Oral daily  atorvastatin 80 milliGRAM(s) Oral at bedtime  cloNIDine 0.1 milliGRAM(s) Oral two times a day  amiodarone    Tablet 200 milliGRAM(s) Oral daily  diltiazem    milliGRAM(s) Oral daily  PARoxetine 20 milliGRAM(s) Oral daily  furosemide    Tablet 40 milliGRAM(s) Oral daily  pantoprazole    Tablet 40 milliGRAM(s) Oral before breakfast  cefTRIAXone   IVPB   IV Intermittent   azithromycin  IVPB   IV Intermittent   cefTRIAXone   IVPB 1 Gram(s) IV Intermittent every 24 hours  azithromycin  IVPB 500 milliGRAM(s) IV Intermittent every 24 hours  aspirin enteric coated 81 milliGRAM(s) Oral daily    MEDICATIONS  (PRN):      Objective:  ICU Vital Signs Last 24 Hrs  T(C): 36.4 (23 Aug 2017 05:00), Max: 37 (22 Aug 2017 12:25)  T(F): 97.6 (23 Aug 2017 05:00), Max: 98.6 (22 Aug 2017 12:25)  HR: 52 (23 Aug 2017 06:25) (50 - 66)  BP: 98/45 (23 Aug 2017 06:25) (95/46 - 172/70)  BP(mean): 70 (23 Aug 2017 06:25) (66 - 117)  ABP: --  ABP(mean): --  RR: 17 (23 Aug 2017 06:25) (14 - 22)  SpO2: 99% (23 Aug 2017 06:25) (93% - 99%)      - @ 07:01  -   @ 07:00  --------------------------------------------------------  IN: 360 mL / OUT: 0 mL / NET: 360 mL      Daily Height in cm: 157.48 (22 Aug 2017 12:25)    Daily Weight in k.9 (22 Aug 2017 19:15)    PHYSICAL EXAM:  Constitutional:    HEENT:    Respiratory:    Cardiovascular:    Gastrointestinal:    Genitourinary:    Extremities:    Vascular:    Neurological:    Skin:      TELEMETRY: sinus bradycardia no events    EKG:     IMAGING:    Labs:                          9.1    4.1   )-----------( 173      ( 23 Aug 2017 05:27 )             27.2         132<L>  |  95<L>  |  36<H>  ----------------------------<  96  4.4   |  25  |  2.21<H>    Ca    8.4      23 Aug 2017 05:27  Phos  4.4       Mg     2.2         TPro  6.2  /  Alb  3.0<L>  /  TBili  0.2  /  DBili  x   /  AST  25  /  ALT  27  /  AlkPhos  101      LIVER FUNCTIONS - ( 23 Aug 2017 05:27 )  Alb: 3.0 g/dL / Pro: 6.2 g/dL / ALK PHOS: 101 U/L / ALT: 27 U/L RC / AST: 25 U/L / GGT: x           PTT - ( 22 Aug 2017 21:02 )  PTT:71.9 sec  Troponin T, Serum: 0.02 ng/mL ( @ 05:27)  Creatine Kinase, Serum: 157 U/L ( @ 05:27)  CKMB Units: 2.1 ng/mL ( 05:27)  CKMB Units: 2.3 ng/mL ( @ 21:02)  Creatine Kinase, Serum: 202 U/L ( @ 21:02)  Troponin T, Serum: 0.02 ng/mL ( @ 21:02)        HEALTH ISSUES - PROBLEM Dx: Admission date: Aug 22; Hosp day #2  CHIEF COMPLAINT:  HPI:  83yo female with pmh aortic stenosis s/p AVR, CAD with stents, CABG (), HTN, HLD, 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 Pt is AOx2 and denies cp, sob, dizziness, headache. However, pt and aid are poor historians. Pt at baseline ambulates with walker. No falls was reported and she has no headaches. noted to have mildly elevated trop .070 now transferred to [facility] for cardiac cath. (22 Aug 2017 12:25)    INTERVAL HISTORY: Sinus bradycardia overnight  Resting comfortably in bed A &O No complaints offered    REVIEW OF SYSTEMS: Denies chest pain, SOB, dizziness, N V; all others negative    MEDICATIONS  (STANDING):  clopidogrel Tablet 75 milliGRAM(s) Oral daily  atorvastatin 80 milliGRAM(s) Oral at bedtime  cloNIDine 0.1 milliGRAM(s) Oral two times a day  amiodarone    Tablet 200 milliGRAM(s) Oral daily  diltiazem    milliGRAM(s) Oral daily  PARoxetine 20 milliGRAM(s) Oral daily  furosemide    Tablet 40 milliGRAM(s) Oral daily  pantoprazole    Tablet 40 milliGRAM(s) Oral before breakfast  cefTRIAXone   IVPB   IV Intermittent   azithromycin  IVPB   IV Intermittent   cefTRIAXone   IVPB 1 Gram(s) IV Intermittent every 24 hours  azithromycin  IVPB 500 milliGRAM(s) IV Intermittent every 24 hours  aspirin enteric coated 81 milliGRAM(s) Oral daily    MEDICATIONS  (PRN):      Objective:  ICU Vital Signs Last 24 Hrs  T(C): 36.4 (23 Aug 2017 05:00), Max: 37 (22 Aug 2017 12:25)  T(F): 97.6 (23 Aug 2017 05:00), Max: 98.6 (22 Aug 2017 12:25)  HR: 52 (23 Aug 2017 06:25) (50 - 66)  BP: 98/45 (23 Aug 2017 06:25) (95/46 - 172/70)  BP(mean): 70 (23 Aug 2017 06:25) (66 - 117)  ABP: --  ABP(mean): --  RR: 17 (23 Aug 2017 06:25) (14 - 22)  SpO2: 99% (23 Aug 2017 06:25) (93% - 99%)       @ 07:01  -   @ 07:00  --------------------------------------------------------  IN: 360 mL / OUT: 0 mL / NET: 360 mL      Daily Height in cm: 157.48 (22 Aug 2017 12:25)    Daily Weight in k.9 (22 Aug 2017 19:15)    PHYSICAL EXAM:  Constitutional: awake alert, NAD  HEENT: NC/AT, sclera clear, oral mucosa pink moist  Respiratory: Regular unlabored    Cardiovascular:    Gastrointestinal:    Genitourinary:    Extremities:    Vascular:    Neurological:    Skin:      TELEMETRY: sinus bradycardia no events    EKG:     IMAGING:    Labs:                          9.1    4.1   )-----------( 173      ( 23 Aug 2017 05:27 )             27.2         132<L>  |  95<L>  |  36<H>  ----------------------------<  96  4.4   |  25  |  2.21<H>    Ca    8.4      23 Aug 2017 05:27  Phos  4.4       Mg     2.2         TPro  6.2  /  Alb  3.0<L>  /  TBili  0.2  /  DBili  x   /  AST  25  /  ALT  27  /  AlkPhos  101      LIVER FUNCTIONS - ( 23 Aug 2017 05:27 )  Alb: 3.0 g/dL / Pro: 6.2 g/dL / ALK PHOS: 101 U/L / ALT: 27 U/L RC / AST: 25 U/L / GGT: x           PTT - ( 22 Aug 2017 21:02 )  PTT:71.9 sec  Troponin T, Serum: 0.02 ng/mL ( @ 05:27)  Creatine Kinase, Serum: 157 U/L ( @ 05:27)  CKMB Units: 2.1 ng/mL ( @ 05:27)  CKMB Units: 2.3 ng/mL ( @ 21:02)  Creatine Kinase, Serum: 202 U/L ( @ 21:02)  Troponin T, Serum: 0.02 ng/mL ( @ 21:02)        HEALTH ISSUES - PROBLEM Dx: Admission date: Aug 22; Hosp day #2  CHIEF COMPLAINT:  HPI:  83yo female with pmh aortic stenosis s/p AVR, CAD with stents, CABG (), HTN, HLD, 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 Pt is AOx2 and denies cp, sob, dizziness, headache. However, pt and aid are poor historians. Pt at baseline ambulates with walker. No falls was reported and she has no headaches. noted to have mildly elevated trop .070 now transferred to [facility] for cardiac cath. (22 Aug 2017 12:25)    INTERVAL HISTORY: Sinus bradycardia overnight  Resting comfortably in bed A &O No complaints offered    REVIEW OF SYSTEMS: Denies chest pain, SOB, dizziness, N V; all others negative    MEDICATIONS  (STANDING):  clopidogrel Tablet 75 milliGRAM(s) Oral daily  atorvastatin 80 milliGRAM(s) Oral at bedtime  cloNIDine 0.1 milliGRAM(s) Oral two times a day  amiodarone    Tablet 200 milliGRAM(s) Oral daily  diltiazem    milliGRAM(s) Oral daily  PARoxetine 20 milliGRAM(s) Oral daily  furosemide    Tablet 40 milliGRAM(s) Oral daily  pantoprazole    Tablet 40 milliGRAM(s) Oral before breakfast  cefTRIAXone   IVPB   IV Intermittent   azithromycin  IVPB   IV Intermittent   cefTRIAXone   IVPB 1 Gram(s) IV Intermittent every 24 hours  azithromycin  IVPB 500 milliGRAM(s) IV Intermittent every 24 hours  aspirin enteric coated 81 milliGRAM(s) Oral daily    MEDICATIONS  (PRN):      Objective:  ICU Vital Signs Last 24 Hrs  T(C): 36.4 (23 Aug 2017 05:00), Max: 37 (22 Aug 2017 12:25)  T(F): 97.6 (23 Aug 2017 05:00), Max: 98.6 (22 Aug 2017 12:25)  HR: 52 (23 Aug 2017 06:25) (50 - 66)  BP: 98/45 (23 Aug 2017 06:25) (95/46 - 172/70)  BP(mean): 70 (23 Aug 2017 06:25) (66 - 117)  ABP: --  ABP(mean): --  RR: 17 (23 Aug 2017 06:25) (14 - 22)  SpO2: 99% (23 Aug 2017 06:25) (93% - 99%)       @ 07:01  -   @ 07:00  --------------------------------------------------------  IN: 360 mL / OUT: 0 mL / NET: 360 mL      Daily Height in cm: 157.48 (22 Aug 2017 12:25)    Daily Weight in k.9 (22 Aug 2017 19:15)    PHYSICAL EXAM:  Constitutional: awake alert, NAD  HEENT: NC/AT, sclera clear, oral mucosa pink moist  Respiratory: Regular unlabored Bilat lower crackle  Cardiovascular: RRR, + sys murmur; no edema LE  Access site - right groin stable soft no hematoma  Gastrointestinal: soft non distended, non tender; + bowel sounds  Genitourinary: voiding on own  Extremities: right hand contracted unable to open up fingers & limited mobility lifting arm up; left hand + hand grasp weakly; able to lift arm higer than right but not full motion.  Moves LE weakly in bed - PT ordered  Vascular: warm peripherally; + PP  Neurological: A & O x 2 calm cooperative mood & affect appropriate  Skin: warm dry no rash or cyanosis      TELEMETRY: sinus bradycardia no events    EKG:     IMAGING:    Labs:                          9.1    4.1   )-----------( 173      ( 23 Aug 2017 05:27 )             27.2     08-23    132<L>  |  95<L>  |  36<H>  ----------------------------<  96  4.4   |  25  |  2.21<H>    Ca    8.4      23 Aug 2017 05:27  Phos  4.4     08-23  Mg     2.2     08-    TPro  6.2  /  Alb  3.0<L>  /  TBili  0.2  /  DBili  x   /  AST  25  /  ALT  27  /  AlkPhos  101  08-23    LIVER FUNCTIONS - ( 23 Aug 2017 05:27 )  Alb: 3.0 g/dL / Pro: 6.2 g/dL / ALK PHOS: 101 U/L / ALT: 27 U/L RC / AST: 25 U/L / GGT: x           PTT - ( 22 Aug 2017 21:02 )  PTT:71.9 sec  Troponin T, Serum: 0.02 ng/mL ( @ 05:27)  Creatine Kinase, Serum: 157 U/L ( @ 05:27)  CKMB Units: 2.1 ng/mL ( @ 05:27)  CKMB Units: 2.3 ng/mL ( @ 21:02)  Creatine Kinase, Serum: 202 U/L ( @ 21:02)  Troponin T, Serum: 0.02 ng/mL ( @ 21:02)        HEALTH ISSUES - PROBLEM Dx:

## 2017-08-23 NOTE — PHYSICAL THERAPY INITIAL EVALUATION ADULT - PERTINENT HX OF CURRENT PROBLEM, REHAB EVAL
Pt. 82 year old female admitted 8-22-17 as transfer from [facility] with LE edema and difficulty ambulating.  Pt. with NSTEMI transferred to [facility] for cardiac cath.  Cath with intervention 2 stens Left main and proximal circumflex

## 2017-08-24 DIAGNOSIS — I21.4 NON-ST ELEVATION (NSTEMI) MYOCARDIAL INFARCTION: ICD-10-CM

## 2017-08-24 DIAGNOSIS — I50.9 HEART FAILURE, UNSPECIFIED: ICD-10-CM

## 2017-08-24 DIAGNOSIS — I11.0 HYPERTENSIVE HEART DISEASE WITH HEART FAILURE: ICD-10-CM

## 2017-08-24 DIAGNOSIS — J15.8 PNEUMONIA DUE TO OTHER SPECIFIED BACTERIA: ICD-10-CM

## 2017-08-24 DIAGNOSIS — Z95.5 PRESENCE OF CORONARY ANGIOPLASTY IMPLANT AND GRAFT: ICD-10-CM

## 2017-08-24 DIAGNOSIS — I69.951 HEMIPLEGIA AND HEMIPARESIS FOLLOWING UNSPECIFIED CEREBROVASCULAR DISEASE AFFECTING RIGHT DOMINANT SIDE: ICD-10-CM

## 2017-08-24 DIAGNOSIS — B96.20 UNSPECIFIED ESCHERICHIA COLI [E. COLI] AS THE CAUSE OF DISEASES CLASSIFIED ELSEWHERE: ICD-10-CM

## 2017-08-24 DIAGNOSIS — E78.5 HYPERLIPIDEMIA, UNSPECIFIED: ICD-10-CM

## 2017-08-24 DIAGNOSIS — I25.10 ATHEROSCLEROTIC HEART DISEASE OF NATIVE CORONARY ARTERY WITHOUT ANGINA PECTORIS: ICD-10-CM

## 2017-08-24 DIAGNOSIS — Z79.02 LONG TERM (CURRENT) USE OF ANTITHROMBOTICS/ANTIPLATELETS: ICD-10-CM

## 2017-08-24 DIAGNOSIS — N30.00 ACUTE CYSTITIS WITHOUT HEMATURIA: ICD-10-CM

## 2017-08-24 DIAGNOSIS — N28.9 DISORDER OF KIDNEY AND URETER, UNSPECIFIED: ICD-10-CM

## 2017-08-24 DIAGNOSIS — E87.1 HYPO-OSMOLALITY AND HYPONATREMIA: ICD-10-CM

## 2017-08-24 DIAGNOSIS — Z79.82 LONG TERM (CURRENT) USE OF ASPIRIN: ICD-10-CM

## 2017-08-24 DIAGNOSIS — F41.9 ANXIETY DISORDER, UNSPECIFIED: ICD-10-CM

## 2017-08-24 DIAGNOSIS — I24.9 ACUTE ISCHEMIC HEART DISEASE, UNSPECIFIED: ICD-10-CM

## 2017-08-24 DIAGNOSIS — I69.954 HEMIPLEGIA AND HEMIPARESIS FOLLOWING UNSPECIFIED CEREBROVASCULAR DISEASE AFFECTING LEFT NON-DOMINANT SIDE: ICD-10-CM

## 2017-08-24 LAB
ANION GAP SERPL CALC-SCNC: 10 MMOL/L — SIGNIFICANT CHANGE UP (ref 5–17)
APTT BLD: 31 SEC — SIGNIFICANT CHANGE UP (ref 27.5–37.4)
BASOPHILS # BLD AUTO: 0 K/UL — SIGNIFICANT CHANGE UP (ref 0–0.2)
BASOPHILS NFR BLD AUTO: 1.1 % — SIGNIFICANT CHANGE UP (ref 0–2)
BUN SERPL-MCNC: 39 MG/DL — HIGH (ref 7–23)
CALCIUM SERPL-MCNC: 8.4 MG/DL — SIGNIFICANT CHANGE UP (ref 8.4–10.5)
CHLORIDE SERPL-SCNC: 95 MMOL/L — LOW (ref 96–108)
CO2 SERPL-SCNC: 25 MMOL/L — SIGNIFICANT CHANGE UP (ref 22–31)
CREAT ?TM UR-MCNC: 39 MG/DL — SIGNIFICANT CHANGE UP
CREAT SERPL-MCNC: 2.37 MG/DL — HIGH (ref 0.5–1.3)
CULTURE RESULTS: SIGNIFICANT CHANGE UP
CULTURE RESULTS: SIGNIFICANT CHANGE UP
EOSINOPHIL # BLD AUTO: 0.2 K/UL — SIGNIFICANT CHANGE UP (ref 0–0.5)
EOSINOPHIL NFR BLD AUTO: 4.6 % — SIGNIFICANT CHANGE UP (ref 0–6)
GLUCOSE SERPL-MCNC: 95 MG/DL — SIGNIFICANT CHANGE UP (ref 70–99)
HCT VFR BLD CALC: 27.1 % — LOW (ref 34.5–45)
HGB BLD-MCNC: 9.2 G/DL — LOW (ref 11.5–15.5)
INR BLD: 1.24 RATIO — HIGH (ref 0.88–1.16)
LYMPHOCYTES # BLD AUTO: 1.7 K/UL — SIGNIFICANT CHANGE UP (ref 1–3.3)
LYMPHOCYTES # BLD AUTO: 36.9 % — SIGNIFICANT CHANGE UP (ref 13–44)
MAGNESIUM SERPL-MCNC: 2.2 MG/DL — SIGNIFICANT CHANGE UP (ref 1.6–2.6)
MCHC RBC-ENTMCNC: 30.5 PG — SIGNIFICANT CHANGE UP (ref 27–34)
MCHC RBC-ENTMCNC: 34 GM/DL — SIGNIFICANT CHANGE UP (ref 32–36)
MCV RBC AUTO: 89.6 FL — SIGNIFICANT CHANGE UP (ref 80–100)
MONOCYTES # BLD AUTO: 0.6 K/UL — SIGNIFICANT CHANGE UP (ref 0–0.9)
MONOCYTES NFR BLD AUTO: 13.9 % — SIGNIFICANT CHANGE UP (ref 2–14)
NEUTROPHILS # BLD AUTO: 2 K/UL — SIGNIFICANT CHANGE UP (ref 1.8–7.4)
NEUTROPHILS NFR BLD AUTO: 43.5 % — SIGNIFICANT CHANGE UP (ref 43–77)
OSMOLALITY SERPL: 281 MOS/KG — SIGNIFICANT CHANGE UP (ref 275–300)
OSMOLALITY UR: 263 MOS/KG — LOW (ref 300–900)
PHOSPHATE SERPL-MCNC: 3.4 MG/DL — SIGNIFICANT CHANGE UP (ref 2.5–4.5)
PLATELET # BLD AUTO: 167 K/UL — SIGNIFICANT CHANGE UP (ref 150–400)
POTASSIUM SERPL-MCNC: 4.1 MMOL/L — SIGNIFICANT CHANGE UP (ref 3.5–5.3)
POTASSIUM SERPL-SCNC: 4.1 MMOL/L — SIGNIFICANT CHANGE UP (ref 3.5–5.3)
POTASSIUM UR-SCNC: 24 MMOL/L — SIGNIFICANT CHANGE UP
PROTHROM AB SERPL-ACNC: 13.4 SEC — HIGH (ref 9.8–12.7)
RBC # BLD: 3.02 M/UL — LOW (ref 3.8–5.2)
RBC # FLD: 13.9 % — SIGNIFICANT CHANGE UP (ref 10.3–14.5)
SODIUM SERPL-SCNC: 130 MMOL/L — LOW (ref 135–145)
SODIUM UR-SCNC: 63 MMOL/L — SIGNIFICANT CHANGE UP
SPECIMEN SOURCE: SIGNIFICANT CHANGE UP
SPECIMEN SOURCE: SIGNIFICANT CHANGE UP
URATE SERPL-MCNC: 8.2 MG/DL — HIGH (ref 2.5–7)
WBC # BLD: 4.5 K/UL — SIGNIFICANT CHANGE UP (ref 3.8–10.5)
WBC # FLD AUTO: 4.5 K/UL — SIGNIFICANT CHANGE UP (ref 3.8–10.5)

## 2017-08-24 PROCEDURE — 99233 SBSQ HOSP IP/OBS HIGH 50: CPT | Mod: GC

## 2017-08-24 RX ADMIN — CLOPIDOGREL BISULFATE 75 MILLIGRAM(S): 75 TABLET, FILM COATED ORAL at 09:28

## 2017-08-24 RX ADMIN — CEFTRIAXONE 100 GRAM(S): 500 INJECTION, POWDER, FOR SOLUTION INTRAMUSCULAR; INTRAVENOUS at 21:20

## 2017-08-24 RX ADMIN — HEPARIN SODIUM 5000 UNIT(S): 5000 INJECTION INTRAVENOUS; SUBCUTANEOUS at 17:06

## 2017-08-24 RX ADMIN — Medication 81 MILLIGRAM(S): at 09:28

## 2017-08-24 RX ADMIN — PANTOPRAZOLE SODIUM 40 MILLIGRAM(S): 20 TABLET, DELAYED RELEASE ORAL at 05:40

## 2017-08-24 RX ADMIN — AMIODARONE HYDROCHLORIDE 200 MILLIGRAM(S): 400 TABLET ORAL at 06:22

## 2017-08-24 RX ADMIN — HEPARIN SODIUM 5000 UNIT(S): 5000 INJECTION INTRAVENOUS; SUBCUTANEOUS at 05:40

## 2017-08-24 RX ADMIN — Medication 20 MILLIGRAM(S): at 09:28

## 2017-08-24 RX ADMIN — AZITHROMYCIN 250 MILLIGRAM(S): 500 TABLET, FILM COATED ORAL at 21:36

## 2017-08-24 RX ADMIN — ATORVASTATIN CALCIUM 80 MILLIGRAM(S): 80 TABLET, FILM COATED ORAL at 21:19

## 2017-08-24 RX ADMIN — Medication 40 MILLIGRAM(S): at 05:40

## 2017-08-24 NOTE — PROGRESS NOTE ADULT - SUBJECTIVE AND OBJECTIVE BOX
Patient is a 82y old  Female who presents with a chief complaint of       SUBJECTIVE / OVERNIGHT EVENTS:  No acute ON events, pt transferred from CCU. Pt c/o "needing to pee" despite being on bedpan, trying to pee but nothing coming out. Pt says there is no burning when she pees. Pt denies chest pain, SOB, cough, difficulty breathing. Pt does not know where she is.           MEDICATIONS  (STANDING):  clopidogrel Tablet 75 milliGRAM(s) Oral daily  atorvastatin 80 milliGRAM(s) Oral at bedtime  amiodarone    Tablet 200 milliGRAM(s) Oral daily  PARoxetine 20 milliGRAM(s) Oral daily  furosemide    Tablet 40 milliGRAM(s) Oral daily  pantoprazole    Tablet 40 milliGRAM(s) Oral before breakfast  cefTRIAXone   IVPB   IV Intermittent   azithromycin  IVPB   IV Intermittent   cefTRIAXone   IVPB 1 Gram(s) IV Intermittent every 24 hours  azithromycin  IVPB 500 milliGRAM(s) IV Intermittent every 24 hours  aspirin enteric coated 81 milliGRAM(s) Oral daily  heparin  Injectable 5000 Unit(s) SubCutaneous every 12 hours    MEDICATIONS  (PRN):        Patient is a 82y old  Female who presents with a chief complaint of       SUBJECTIVE / OVERNIGHT EVENTS:      MEDICATIONS  (STANDING):  clopidogrel Tablet 75 milliGRAM(s) Oral daily  atorvastatin 80 milliGRAM(s) Oral at bedtime  amiodarone    Tablet 200 milliGRAM(s) Oral daily  PARoxetine 20 milliGRAM(s) Oral daily  furosemide    Tablet 40 milliGRAM(s) Oral daily  pantoprazole    Tablet 40 milliGRAM(s) Oral before breakfast  cefTRIAXone   IVPB   IV Intermittent   azithromycin  IVPB   IV Intermittent   cefTRIAXone   IVPB 1 Gram(s) IV Intermittent every 24 hours  azithromycin  IVPB 500 milliGRAM(s) IV Intermittent every 24 hours  aspirin enteric coated 81 milliGRAM(s) Oral daily  heparin  Injectable 5000 Unit(s) SubCutaneous every 12 hours      Vital Signs Last 24 Hrs  T(C): 36.7 (24 Aug 2017 04:43), Max: 37.1 (23 Aug 2017 20:00)  T(F): 98 (24 Aug 2017 04:43), Max: 98.7 (23 Aug 2017 20:00)  HR: 53 (24 Aug 2017 04:43) (48 - 59)  BP: 125/69 (24 Aug 2017 04:43) (85/46 - 125/69)  BP(mean): 70 (24 Aug 2017 00:00) (59 - 88)  RR: 17 (24 Aug 2017 04:43) (11 - 25)  SpO2: 100% (24 Aug 2017 04:43) (97% - 100%)        I&O's Summary    23 Aug 2017 07:01  -  24 Aug 2017 07:00  --------------------------------------------------------  IN: 620 mL / OUT: 0 mL / NET: 620 mL        PHYSICAL EXAM  GENERAL: NAD, well-developed  HEAD:  Atraumatic, Normocephalic, only 1 tooth present, no thrush   EYES: EOMI, PERRLA, conjunctiva and sclera clear  NECK: Supple, No JVD, no lymphadenopathy  CHEST/LUNG: Clear to auscultation bilaterally; No wheeze  HEART: Regular rate and rhythm; III/VI blowing holosystolic murmur, no rubs   ABDOMEN: Soft, Nontender, Nondistended; Bowel sounds present  EXTREMITIES:  2+ Peripheral Pulses, No clubbing, cyanosis, or edema  PSYCH: AAOx1, confused, disoriented, irritable   SKIN: No rashes or lesions, B/L scars on knees from replacements     LABS:                        9.2    4.5   )-----------( 167      ( 24 Aug 2017 06:30 )             27.1     08-24    130<L>  |  95<L>  |  39<H>  ----------------------------<  95  4.1   |  25  |  2.37<H>    Ca    8.4      24 Aug 2017 06:30  Phos  3.4     08-24  Mg     2.2     08-24    TPro  6.2  /  Alb  3.0<L>  /  TBili  0.2  /  DBili  x   /  AST  25  /  ALT  27  /  AlkPhos  101  08-23    PT/INR - ( 24 Aug 2017 06:30 )   PT: 13.4 sec;   INR: 1.24 ratio         PTT - ( 24 Aug 2017 06:30 )  PTT:31.0 sec  CARDIAC MARKERS ( 23 Aug 2017 05:27 )  x     / 0.02 ng/mL / 157 U/L / x     / 2.1 ng/mL  CARDIAC MARKERS ( 22 Aug 2017 21:02 )  x     / 0.02 ng/mL / 202 U/L / x     / 2.3 ng/mL      RADIOLOGY & ADDITIONAL TESTS:    Imaging Personally Reviewed: CXR, lower ext duplex, CT brain (studies from 8/18)  Consultant(s) Notes Reviewed:    Care Discussed with Consultants/Other Providers:

## 2017-08-24 NOTE — PROGRESS NOTE ADULT - ASSESSMENT
81 y/o F w/ hx aortic stenosis s/p AVR, CAD with multiple stents, CABG (2012), HTN, HLD, hx multiple CVA (residual b/L weakness, greater on R than L), presented initially to [place] w/ [facility] extremity edema and pain, difficulty ambulating x 1 wk (baseline ambulatory w/ walker), found to have pneumonia (tx w/ azithromycin/ceftriaxone) + CHF (tx w/ Lasix 40 mg daily), began leaking troponins (trop 0.70) which raised concern for NSTEMI, was transferred for cath. Now s/p cath thru R femoral w/ no hematoma, placed drug-eluting-stent in left main and proximal circumflex arteries, having some asymptomic bradycardia post-cath, now complaining of urinary retention but no chest pain, SOB, dyspnea, or palpitations.

## 2017-08-24 NOTE — PROGRESS NOTE ADULT - PROBLEM SELECTOR PLAN 6
- Pt not endorsing any symptoms of chest pain, difficulty breathing, no crackles, rhonchi or wheezing on exam  - tx w/ ceftriaxone, azithromycin for 4 days  - repeat CXR if lung findings  - no fever/ chills, pt not appearing toxic, blood cultures clear from 8/18

## 2017-08-24 NOTE — PROGRESS NOTE ADULT - PROBLEM SELECTOR PLAN 5
- Urine culture growing E. coli > 100,000 colonies from 8/18  - sensitive to ceftriaxone, currently being treated, today is day  - pt endorsing urinary retention/ difficulty urinating but no dysuria, is incontinent  - bladder scan w/ possible straight cath for residual  - no fever/ chills, pt not appearing toxic, blood cultures clear from 8/18

## 2017-08-24 NOTE — PROGRESS NOTE ADULT - PROBLEM SELECTOR PLAN 4
- Current Na is 130, likely due to SIADH  - f/u urine lytes, urine osmolality is 281 (inappropriately high considering serum Na)

## 2017-08-24 NOTE — PROGRESS NOTE ADULT - PROBLEM SELECTOR PLAN 2
- Clinically well, very mild peripheral edema (1+ on lower extremities),no crackles in the lungs  - Pt comfortable, not orthopneic, saturating well on RA  - Continue Lasix 40 mg (watch Creatinine)

## 2017-08-24 NOTE — CHART NOTE - NSCHARTNOTEFT_GEN_A_CORE
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 with LE edema and pain with difficulty ambulating for 1 week duration. Patient was found to have pneumonia and was treated with Azithromycin and Ceftriaxone. She was also found to have congestive heart failure and was diuresed with Lasix 40 mg daily. Patient was noted to have mildly elevated trop .070 concerning for NSTEMI, now transferred to [facility] for LHC. She had R femoral access for LHC, no hematoma at the site. She had 2 CRISTIAN to L main and proximal circumflex. She has no chest pain, no shortness of breath and no palpitations. Patient had some episodes of asymptomatic sinus bradycardia on Tele post-intervention. 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 Patient was found to have PNA and treated with ceftriaxone/azithromycin. She was also found to have congestive heart failure and was diuresed with Lasix 40 mg daily. Troponin 0.7 concerning for NSTEMI, now transferred to [facility] for LHC. She had R femoral access for LHC, no hematoma at the site. She had 2 CRISTIAN to L main and proximal circumflex. She has no chest pain, no shortness of breath and no palpitations. Patient had some episodes of asymptomatic sinus bradycardia on Tele post-intervention. 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 Patient was found to have PNA and treated with ceftriaxone/azithromycin. She was also found to have congestive heart failure and was diuresed with Lasix 40 mg daily. Troponin 0.7 concerning for NSTEMI. Patient transferred for NSTEMI s/p 2 stents (CRISTIAN to L main and proximal circumflex). She has no chest pain, no shortness of breath and no palpitations. Patient had some episodes of asymptomatic sinus bradycardia on Tele post-intervention (Currently in the 50s). Last dose of ABX today. Patient continues to have NANDO on CKD (baseline Cr. 1.7). Patient was seen this morning. No acute events overnight. Plan to monitor Cr (workup for NANDO on CKD); d/c antbiotics following a 5 day course.     Elisabet Rodriguez  MAR- [id number]

## 2017-08-24 NOTE — PROGRESS NOTE ADULT - PROBLEM SELECTOR PLAN 7
- On aspirin, plavix, statin   - s/p 2 new drug eluting stents on 8/18  - no chest pain, dyspnea, SOB right now

## 2017-08-25 LAB
ANION GAP SERPL CALC-SCNC: 12 MMOL/L — SIGNIFICANT CHANGE UP (ref 5–17)
ANION GAP SERPL CALC-SCNC: 15 MMOL/L — SIGNIFICANT CHANGE UP (ref 5–17)
BUN SERPL-MCNC: 39 MG/DL — HIGH (ref 7–23)
BUN SERPL-MCNC: 40 MG/DL — HIGH (ref 7–23)
CALCIUM SERPL-MCNC: 8.6 MG/DL — SIGNIFICANT CHANGE UP (ref 8.4–10.5)
CALCIUM SERPL-MCNC: 8.7 MG/DL — SIGNIFICANT CHANGE UP (ref 8.4–10.5)
CHLORIDE SERPL-SCNC: 90 MMOL/L — LOW (ref 96–108)
CHLORIDE SERPL-SCNC: 90 MMOL/L — LOW (ref 96–108)
CO2 SERPL-SCNC: 23 MMOL/L — SIGNIFICANT CHANGE UP (ref 22–31)
CO2 SERPL-SCNC: 24 MMOL/L — SIGNIFICANT CHANGE UP (ref 22–31)
CORTIS AM PEAK SERPL-MCNC: 15.7 UG/DL — SIGNIFICANT CHANGE UP (ref 6–18.4)
CREAT ?TM UR-MCNC: 68 MG/DL — SIGNIFICANT CHANGE UP
CREAT SERPL-MCNC: 2.12 MG/DL — HIGH (ref 0.5–1.3)
CREAT SERPL-MCNC: 2.15 MG/DL — HIGH (ref 0.5–1.3)
GLUCOSE SERPL-MCNC: 100 MG/DL — HIGH (ref 70–99)
GLUCOSE SERPL-MCNC: 108 MG/DL — HIGH (ref 70–99)
HCT VFR BLD CALC: 30.1 % — LOW (ref 34.5–45)
HGB BLD-MCNC: 10.2 G/DL — LOW (ref 11.5–15.5)
MCHC RBC-ENTMCNC: 30.1 PG — SIGNIFICANT CHANGE UP (ref 27–34)
MCHC RBC-ENTMCNC: 33.7 GM/DL — SIGNIFICANT CHANGE UP (ref 32–36)
MCV RBC AUTO: 89.3 FL — SIGNIFICANT CHANGE UP (ref 80–100)
OSMOLALITY UR: 322 MOS/KG — SIGNIFICANT CHANGE UP (ref 300–900)
PLATELET # BLD AUTO: 179 K/UL — SIGNIFICANT CHANGE UP (ref 150–400)
POTASSIUM SERPL-MCNC: 4.4 MMOL/L — SIGNIFICANT CHANGE UP (ref 3.5–5.3)
POTASSIUM SERPL-MCNC: 4.9 MMOL/L — SIGNIFICANT CHANGE UP (ref 3.5–5.3)
POTASSIUM SERPL-SCNC: 4.4 MMOL/L — SIGNIFICANT CHANGE UP (ref 3.5–5.3)
POTASSIUM SERPL-SCNC: 4.9 MMOL/L — SIGNIFICANT CHANGE UP (ref 3.5–5.3)
POTASSIUM UR-SCNC: 33 MMOL/L — SIGNIFICANT CHANGE UP
RBC # BLD: 3.37 M/UL — LOW (ref 3.8–5.2)
RBC # FLD: 14 % — SIGNIFICANT CHANGE UP (ref 10.3–14.5)
SODIUM SERPL-SCNC: 126 MMOL/L — LOW (ref 135–145)
SODIUM SERPL-SCNC: 128 MMOL/L — LOW (ref 135–145)
SODIUM UR-SCNC: 32 MMOL/L — SIGNIFICANT CHANGE UP
WBC # BLD: 5.6 K/UL — SIGNIFICANT CHANGE UP (ref 3.8–10.5)
WBC # FLD AUTO: 5.6 K/UL — SIGNIFICANT CHANGE UP (ref 3.8–10.5)

## 2017-08-25 PROCEDURE — 99233 SBSQ HOSP IP/OBS HIGH 50: CPT | Mod: GC

## 2017-08-25 PROCEDURE — 99233 SBSQ HOSP IP/OBS HIGH 50: CPT

## 2017-08-25 RX ORDER — SIMETHICONE 80 MG/1
80 TABLET, CHEWABLE ORAL
Qty: 0 | Refills: 0 | Status: DISCONTINUED | OUTPATIENT
Start: 2017-08-25 | End: 2017-08-29

## 2017-08-25 RX ORDER — SODIUM CHLORIDE 9 MG/ML
1 INJECTION INTRAMUSCULAR; INTRAVENOUS; SUBCUTANEOUS DAILY
Qty: 0 | Refills: 0 | Status: DISCONTINUED | OUTPATIENT
Start: 2017-08-25 | End: 2017-08-26

## 2017-08-25 RX ADMIN — Medication 81 MILLIGRAM(S): at 09:16

## 2017-08-25 RX ADMIN — Medication 40 MILLIGRAM(S): at 05:52

## 2017-08-25 RX ADMIN — HEPARIN SODIUM 5000 UNIT(S): 5000 INJECTION INTRAVENOUS; SUBCUTANEOUS at 05:52

## 2017-08-25 RX ADMIN — CEFTRIAXONE 100 GRAM(S): 500 INJECTION, POWDER, FOR SOLUTION INTRAMUSCULAR; INTRAVENOUS at 22:28

## 2017-08-25 RX ADMIN — Medication 20 MILLIGRAM(S): at 09:16

## 2017-08-25 RX ADMIN — CLOPIDOGREL BISULFATE 75 MILLIGRAM(S): 75 TABLET, FILM COATED ORAL at 09:16

## 2017-08-25 RX ADMIN — AMIODARONE HYDROCHLORIDE 200 MILLIGRAM(S): 400 TABLET ORAL at 05:52

## 2017-08-25 RX ADMIN — SIMETHICONE 80 MILLIGRAM(S): 80 TABLET, CHEWABLE ORAL at 20:11

## 2017-08-25 RX ADMIN — AZITHROMYCIN 250 MILLIGRAM(S): 500 TABLET, FILM COATED ORAL at 21:20

## 2017-08-25 RX ADMIN — HEPARIN SODIUM 5000 UNIT(S): 5000 INJECTION INTRAVENOUS; SUBCUTANEOUS at 17:01

## 2017-08-25 RX ADMIN — ATORVASTATIN CALCIUM 80 MILLIGRAM(S): 80 TABLET, FILM COATED ORAL at 21:22

## 2017-08-25 RX ADMIN — SODIUM CHLORIDE 1 GRAM(S): 9 INJECTION INTRAMUSCULAR; INTRAVENOUS; SUBCUTANEOUS at 16:30

## 2017-08-25 RX ADMIN — PANTOPRAZOLE SODIUM 40 MILLIGRAM(S): 20 TABLET, DELAYED RELEASE ORAL at 05:52

## 2017-08-25 NOTE — PROGRESS NOTE ADULT - PROBLEM SELECTOR PLAN 1
- s/p left heart catheterization via R femoral artery 8/22 w/ placement of 2 drug eluting stents (L main and circumflex arteries)  - no chest pain or SOB post procedure  - On aspirin, plavix, statin tho holding ACEI/ BB 2/2 low BP + NANDO   - TTE w/ EF of 84 but mod-severe MR and dialted LA, hyperdynamic LV, normal RV  - Repeat troponins?  - F/u EKG

## 2017-08-25 NOTE — PROGRESS NOTE ADULT - PROBLEM SELECTOR PLAN 5
- Clinically well, very mild peripheral edema, no crackles in the lungs  - Pt comfortable, not orthopneic, saturating well on RA  - Continue Lasix, possible decrease to dose today given NANDO, lack of signs of CHF - Clinically well, very mild peripheral edema, no crackles in the lungs  - Pt comfortable, not orthopneic, saturating well on RA  - Continue Lasix

## 2017-08-25 NOTE — PROGRESS NOTE ADULT - SUBJECTIVE AND OBJECTIVE BOX
CHIEF COMPLAINT/REASON FOR CONSULT:   Patient is a 82y old  Female who presents with a chief complaint of     BRIEF SUMMARY:  82 y.o.F - h.o. Significant HTN, HL, Intermittent Confusion, Stroke with R>L Weakness, CKD, CAD s/p CABG with multiple prior PCIs -  initially p/t [place] with LE edema and weakness with course c/b PNA, N-STEMI s/p CRISTIAN to L Main and pLCx as well as PNA, UTI, Hyponatremia  ================================================  24 HR EVENTS:  No CP/Palps/SOB  Cr stable 2.1  ================================================  Allergies    codeine (Unknown)    Intolerances    	    MEDICATIONS:  clopidogrel Tablet 75 milliGRAM(s) Oral daily  amiodarone    Tablet 200 milliGRAM(s) Oral daily  furosemide    Tablet 40 milliGRAM(s) Oral daily  aspirin enteric coated 81 milliGRAM(s) Oral daily  heparin  Injectable 5000 Unit(s) SubCutaneous every 12 hours    cefTRIAXone   IVPB   IV Intermittent   azithromycin  IVPB   IV Intermittent   cefTRIAXone   IVPB 1 Gram(s) IV Intermittent every 24 hours  azithromycin  IVPB 500 milliGRAM(s) IV Intermittent every 24 hours      PARoxetine 20 milliGRAM(s) Oral daily    pantoprazole    Tablet 40 milliGRAM(s) Oral before breakfast    atorvastatin 80 milliGRAM(s) Oral at bedtime        PAST MEDICAL & SURGICAL HISTORY:  CVA (cerebral infarction)  Anxiety  HLD (hyperlipidemia)  HTN (hypertension)  Hypertension  Hyperlipidemia  Bilateral cataracts  Osteoarthritis  Myelodysplastic syndrome: diagnosed 5 years ago  H/O spinal stenosis  Aortic stenosis: severe s/p cardiac cath 9/21/12  Asthma  Myeloblastic anemia  Moe esophagus  H/O heart artery stent  CAD (coronary artery disease)  S/P total knee replacement: bilateral in 2009  H/O breast biopsy: left , years ago  S/P dilatation and curettage: years ago  CAD S/P percutaneous coronary angioplasty: 1 stent placed 2009  one stent placed 2010  rectal prolapse repaired: 20+ years ago      FAMILY HISTORY:  Family history of diabetes mellitus (Child): daughter      REVIEW OF SYSTEMS:  Constitutional: No Fever, Fatigue, Weight Changes  Eyes: No recent vision changes, eye pain  ENT: No Congestion, Ear Pain, Sore Throat  Endocrine: No Excess Sweating, Temperature Intolerance  Cardiovascular: No Chest Pain, Palpitations, SOB, Pre-syncope, Syncope  Respiratory: No Cough, Congestion, Wheezing  GI: No dysuria, hematuria  MS: No Joint Pain, Swelling  Neurologic: No Headaches, Imbalance, Focal Deficits  Skin: No Rashes, Hematoma, Prurpura    PHYSICAL EXAM:  T(C): 36.9 (08-25-17 @ 04:51), Max: 36.9 (08-25-17 @ 04:51)  HR: 63 (08-25-17 @ 04:51) (56 - 63)  BP: 149/74 (08-25-17 @ 04:51) (108/66 - 149/74)  RR: 18 (08-25-17 @ 04:51) (18 - 18)  SpO2: 91% (08-25-17 @ 04:51) (91% - 98%)  Wt(kg): --  I&O's Summary    24 Aug 2017 07:01  -  25 Aug 2017 07:00  --------------------------------------------------------  IN: 1220 mL / OUT: 1450 mL / NET: -230 mL        Appearance: Normal	  HEENT:   Normal oral mucosa, PERRL, EOMI	  Lymphatic: No lymphadenopathy  Cardiovascular: Normal S1 S2, No JVD, II/VI ALHAJI, No edema  Respiratory: Lungs clear to auscultation, no use of accessory muscles	  Psychiatry: A & O x 2-3, Mood & affect appropriate  Gastrointestinal:  Soft, Non-tender, + BS	  Skin: No rashes, No ecchymoses, No cyanosis	  Neurologic: Non-focal  Extremities: Normal range of motion, No clubbing, cyanosis or edema  Vascular: Peripheral pulses palpable 2+ bilaterally, no prominent varicosities      LABS:	  Labs Reviewed 	08-25-17 @ 11:02    CBC Full  -  ( 25 Aug 2017 06:28 )  WBC Count : 5.6 K/uL  Hemoglobin : 10.2 g/dL  Hematocrit : 30.1 %  Platelet Count - Automated : 179 K/uL  Mean Cell Volume : 89.3 fl  Mean Cell Hemoglobin : 30.1 pg  Mean Cell Hemoglobin Concentration : 33.7 gm/dL  Auto Neutrophil # : x  Auto Lymphocyte # : x  Auto Monocyte # : x  Auto Eosinophil # : x  Auto Basophil # : x  Auto Neutrophil % : x  Auto Lymphocyte % : x  Auto Monocyte % : x  Auto Eosinophil % : x  Auto Basophil % : x    08-25    128<L>  |  90<L>  |  40<H>  ----------------------------<  100<H>  4.4   |  23  |  2.12<H>  08-24    130<L>  |  95<L>  |  39<H>  ----------------------------<  95  4.1   |  25  |  2.37<H>    Ca    8.7      25 Aug 2017 06:28  Ca    8.4      24 Aug 2017 06:30  Phos  3.4     08-24  Mg     2.2     08-24      TELEMETRY: 	    	  =======================================================================================  =======================================================================================  Assessment:  82 y.o.F - h.o. Significant HTN, HL, Intermittent Confusion, Stroke with R>L Weakness, CKD Cr 2.1, CAD s/p CABG with multiple prior PCIs -  initially p/t [place] with LE edema and weakness with course c/b PNA, N-STEMI s/p CRISTIAN to L Main and pLCx as well as PNA, UTI, Hyponatremia    Problems:  1. CAD s/p CABG and Recent L Main pLCx Stent  2. CHF  3. CVA  4. OX2  5. Significant HTN  6. HL      Recommendations  - Continue ASA/Plavix  - Continue Lasix 40  - Continue Atorva 80  - Continue Antihypertensives  - No need to trend trops  - Tele  - ? Why on Amiodarone - AF - obtain collateral re: AC     Please call with questions.    Tha Santillan MD [facility]  [phone] CHIEF COMPLAINT/REASON FOR CONSULT:   Patient is a 82y old  Female who presents with a chief complaint of     BRIEF SUMMARY:  82 y.o.F - h.o. Significant HTN, HL, Intermittent Confusion, Stroke with R>L Weakness, CKD, CAD s/p CABG with multiple prior PCIs -  initially p/t [place] with LE edema and weakness with course c/b PNA, N-STEMI s/p CRISTIAN to L Main and pLCx as well as PNA, UTI, Hyponatremia  ================================================  24 HR EVENTS:  No CP/Palps/SOB  Cr stable 2.1  SR with 1 block   ================================================  Allergies    codeine (Unknown)    Intolerances    	    MEDICATIONS:  clopidogrel Tablet 75 milliGRAM(s) Oral daily  amiodarone    Tablet 200 milliGRAM(s) Oral daily  furosemide    Tablet 40 milliGRAM(s) Oral daily  aspirin enteric coated 81 milliGRAM(s) Oral daily  heparin  Injectable 5000 Unit(s) SubCutaneous every 12 hours    cefTRIAXone   IVPB   IV Intermittent   azithromycin  IVPB   IV Intermittent   cefTRIAXone   IVPB 1 Gram(s) IV Intermittent every 24 hours  azithromycin  IVPB 500 milliGRAM(s) IV Intermittent every 24 hours      PARoxetine 20 milliGRAM(s) Oral daily    pantoprazole    Tablet 40 milliGRAM(s) Oral before breakfast    atorvastatin 80 milliGRAM(s) Oral at bedtime        PAST MEDICAL & SURGICAL HISTORY:  CVA (cerebral infarction)  Anxiety  HLD (hyperlipidemia)  HTN (hypertension)  Hypertension  Hyperlipidemia  Bilateral cataracts  Osteoarthritis  Myelodysplastic syndrome: diagnosed 5 years ago  H/O spinal stenosis  Aortic stenosis: severe s/p cardiac cath 9/21/12  Asthma  Myeloblastic anemia  Moe esophagus  H/O heart artery stent  CAD (coronary artery disease)  S/P total knee replacement: bilateral in 2009  H/O breast biopsy: left , years ago  S/P dilatation and curettage: years ago  CAD S/P percutaneous coronary angioplasty: 1 stent placed 2009  one stent placed 2010  rectal prolapse repaired: 20+ years ago      FAMILY HISTORY:  Family history of diabetes mellitus (Child): daughter      REVIEW OF SYSTEMS:  Constitutional: No Fever, Fatigue, Weight Changes  Eyes: No recent vision changes, eye pain  ENT: No Congestion, Ear Pain, Sore Throat  Endocrine: No Excess Sweating, Temperature Intolerance  Cardiovascular: No Chest Pain, Palpitations, SOB, Pre-syncope, Syncope  Respiratory: No Cough, Congestion, Wheezing  GI: No dysuria, hematuria  MS: No Joint Pain, Swelling  Neurologic: No Headaches, Imbalance, Focal Deficits  Skin: No Rashes, Hematoma, Prurpura    PHYSICAL EXAM:  T(C): 36.9 (08-25-17 @ 04:51), Max: 36.9 (08-25-17 @ 04:51)  HR: 63 (08-25-17 @ 04:51) (56 - 63)  BP: 149/74 (08-25-17 @ 04:51) (108/66 - 149/74)  RR: 18 (08-25-17 @ 04:51) (18 - 18)  SpO2: 91% (08-25-17 @ 04:51) (91% - 98%)  Wt(kg): --  I&O's Summary    24 Aug 2017 07:01  -  25 Aug 2017 07:00  --------------------------------------------------------  IN: 1220 mL / OUT: 1450 mL / NET: -230 mL        Appearance: Normal	  HEENT:   Normal oral mucosa, PERRL, EOMI	  Lymphatic: No lymphadenopathy  Cardiovascular: Normal S1 S2, No JVD, II/VI ALHAJI, No edema  Respiratory: Lungs clear to auscultation, no use of accessory muscles	  Psychiatry: A & O x 2-3, Mood & affect appropriate  Gastrointestinal:  Soft, Non-tender, + BS	  Skin: No rashes, No ecchymoses, No cyanosis	  Neurologic: Non-focal  Extremities: Normal range of motion, No clubbing, cyanosis or edema  Vascular: Peripheral pulses palpable 2+ bilaterally, no prominent varicosities      LABS:	  Labs Reviewed 	08-25-17 @ 11:02    CBC Full  -  ( 25 Aug 2017 06:28 )  WBC Count : 5.6 K/uL  Hemoglobin : 10.2 g/dL  Hematocrit : 30.1 %  Platelet Count - Automated : 179 K/uL  Mean Cell Volume : 89.3 fl  Mean Cell Hemoglobin : 30.1 pg  Mean Cell Hemoglobin Concentration : 33.7 gm/dL  Auto Neutrophil # : x  Auto Lymphocyte # : x  Auto Monocyte # : x  Auto Eosinophil # : x  Auto Basophil # : x  Auto Neutrophil % : x  Auto Lymphocyte % : x  Auto Monocyte % : x  Auto Eosinophil % : x  Auto Basophil % : x    08-25    128<L>  |  90<L>  |  40<H>  ----------------------------<  100<H>  4.4   |  23  |  2.12<H>  08-24    130<L>  |  95<L>  |  39<H>  ----------------------------<  95  4.1   |  25  |  2.37<H>    Ca    8.7      25 Aug 2017 06:28  Ca    8.4      24 Aug 2017 06:30  Phos  3.4     08-24  Mg     2.2     08-24      TELEMETRY: 	    	  SR with 1st degree  =======================================================================================  =======================================================================================  Assessment:  82 y.o.F - h.o. Significant HTN, HL, Intermittent Confusion, Stroke with R>L Weakness, CKD Cr 2.1, CAD s/p CABG with multiple prior PCIs -  initially p/t [place] with LE edema and weakness with course c/b PNA, N-STEMI s/p CRISTIAN to L Main and pLCx as well as PNA, UTI, Hyponatremia    Problems:  1. CAD s/p CABG and Recent L Main pLCx Stent  2. CHF  3. CVA  4. OX2  5. Significant HTN  6. HL      Recommendations  - Continue ASA/Plavix  - Continue Lasix 40  - Continue Atorva 80  - Continue Antihypertensives  - No need to trend trops  - Tele  - ? Why on Amiodarone - AF - obtain collateral re: AC     Please call with questions.    Tha Santillan MD Located within [facility]  [phone]

## 2017-08-25 NOTE — PROGRESS NOTE ADULT - SUBJECTIVE AND OBJECTIVE BOX
Patient is a 82y old  Female who presents with a chief complaint of lower extremity weakness/ edema.    81 y/o AA female w/ aortic stenosis (s/p AVR), CAD (s/p CABG and multiple stents), HTN, HLD, CKD, multiple past CVAs w/ residual weakness bilaterally, R>L (also contractures on RUE), who presented to [place] for lower extremity edema + weakness, found to have PNA + CHF, tx w/ ceftriaxone/ azithro + lasix 40 mg Qday, transferred to [facility] for cath after she started leaking troponins, thought to be having an NSTEMI. Now s/p L heart cath w/ CRISTIAN placed in L main and proximal circumflex arteries. Also found to have a UTI growing e coli, now w/ NANDO and hyponatremia as well.     SUBJECTIVE / OVERNIGHT EVENTS:  No acute overnight events. Pt saying she slept well, no complaints except that she is cold. No chest pain, SOB, or orthopnea per patient. Pt remains somewhat disoriented/ confused (though per daughter she is quite confused at baseline).     MEDICATIONS  (STANDING):  clopidogrel Tablet 75 milliGRAM(s) Oral daily  atorvastatin 80 milliGRAM(s) Oral at bedtime  amiodarone    Tablet 200 milliGRAM(s) Oral daily  PARoxetine 20 milliGRAM(s) Oral daily  furosemide    Tablet 40 milliGRAM(s) Oral daily  pantoprazole    Tablet 40 milliGRAM(s) Oral before breakfast  cefTRIAXone   IVPB   IV Intermittent   azithromycin  IVPB   IV Intermittent   cefTRIAXone   IVPB 1 Gram(s) IV Intermittent every 24 hours  azithromycin  IVPB 500 milliGRAM(s) IV Intermittent every 24 hours  aspirin enteric coated 81 milliGRAM(s) Oral daily  heparin  Injectable 5000 Unit(s) SubCutaneous every 12 hours      Vital Signs Last 24 Hrs  T(C): 36.9 (25 Aug 2017 04:51), Max: 36.9 (25 Aug 2017 04:51)  T(F): 98.5 (25 Aug 2017 04:51), Max: 98.5 (25 Aug 2017 04:51)  HR: 63 (25 Aug 2017 04:51) (56 - 63)  BP: 149/74 (25 Aug 2017 04:51) (108/66 - 149/74)  BP(mean): --  RR: 18 (25 Aug 2017 04:51) (18 - 18)  SpO2: 91% (25 Aug 2017 04:51) (91% - 98%)      I&O's Summary    24 Aug 2017 07:01  -  25 Aug 2017 07:00  --------------------------------------------------------  IN: 1220 mL / OUT: 1450 mL / NET: -230 mL        PHYSICAL EXAM  GENERAL: NAD, well-developed  HEAD:  Atraumatic, Normocephalic, only one tooth (which is in poor condition)  EYES: EOMI, PERRLA, conjunctiva and sclera clear  NECK: Supple, No JVD  CHEST/LUNG: Clear to auscultation bilaterally; No wheezes or crackles  HEART: Regular rate and rhythm; III/VI holosystolic murmur, no rubs, or gallops appreciated   ABDOMEN: Soft, Nontender, Nondistended; Bowel sounds present  EXTREMITIES:  2+ Peripheral Pulses, No clubbing, cyanosis, trace edema only  PSYCH: AAOx1, disoriented but calm  SKIN: No rashes or lesions, catheter site at R groin w/ no hematoma     LABS:                        10.2   5.6   )-----------( 179      ( 25 Aug 2017 06:28 )             30.1     08-25    128<L>  |  90<L>  |  40<H>  ----------------------------<  100<H>  4.4   |  23  |  2.12<H>    Ca    8.7      25 Aug 2017 06:28  Phos  3.4     08-24  Mg     2.2     08-24      PT/INR - ( 24 Aug 2017 06:30 )   PT: 13.4 sec;   INR: 1.24 ratio         PTT - ( 24 Aug 2017 06:30 )  PTT:31.0 sec      RADIOLOGY & ADDITIONAL TESTS:    Imaging Personally Reviewed:   Consultant(s) Notes Reviewed:    Care Discussed with Consultants/Other Providers: nursing

## 2017-08-25 NOTE — PROGRESS NOTE ADULT - PROBLEM SELECTOR PLAN 6
- Pt not endorsing any symptoms of chest pain, difficulty breathing, no crackles, rhonchi or wheezing on exam  - tx w/ ceftriaxone, azithromycin for 4 days (today is day 5)  - repeat CXR if lung findings  - no fever/ chills, pt not appearing toxic, blood cultures clear from 8/18

## 2017-08-25 NOTE — PROGRESS NOTE ADULT - PROBLEM SELECTOR PLAN 2
- Na tending down, now 128, likely due to SIADH, possibly a component of overdiuresis   - Plasma osmolality 281, urine osmolality 263, urine sodium 63 (consistent w/ SIADH though plasma osmolality not overly low) - Na tending down, now 128, likely due to SIADH  - Plasma osmolality 281, urine osmolality 263, urine sodium 63 (consistent w/ SIADH though plasma osmolality not overly low)  - Pt on fluid restricted diet to 1L

## 2017-08-25 NOTE — PROGRESS NOTE ADULT - PROBLEM SELECTOR PLAN 4
- Urine culture growing E. coli > 100,000 colonies from 8/18  - sensitive to ceftriaxone, currently being treated, today is day 6   - pt endorsing urinary retention/ difficulty urinating but no dysuria, is incontinent  - bladder scan w/ possible straight cath for residual  - no fever/ chills, pt not appearing toxic, blood cultures clear from 8/18

## 2017-08-25 NOTE — PROGRESS NOTE ADULT - ASSESSMENT
83 y/o F w/ hx aortic stenosis s/p AVR, CAD with multiple stents, CABG (2012), HTN, HLD, hx multiple CVA (residual b/L weakness, greater on R than L), [facility] [place] [facility] and [facility], difficulty ambulating x 1 wk (baseline ambulatory w/ walker), found to have pneumonia (tx w/ azithromycin/ceftriaxone) + CHF (tx w/ Lasix 40 mg daily), began leaking troponins (trop 0.70) which raised concern for NSTEMI, was transferred for cath. Now s/p left heart cath thru R femoral artery w/ no hematoma, placed drug-eluting-stent in left main and proximal circumflex arteries, having some asymptomic bradycardia post-cath, s/p steve for urinary retention, now w/ worsening hyponatremia + NANDO, possibly 2/2 overdiuresis + SIADH.

## 2017-08-25 NOTE — PROGRESS NOTE ADULT - PROBLEM SELECTOR PLAN 9
Blood pressure currently on the lower side, 108/66 - 149/74, holding beta blocker and ACEI  - monitor BP  - add back BB, ACEI as tolerated

## 2017-08-25 NOTE — PROGRESS NOTE ADULT - PROBLEM SELECTOR PLAN 3
- Pt previous baseline creatinine 1.7, now 2.12 (improved from previous day marginally) [8/18:2.25-->2.15--> 2.21--> 2.34--> 2.21--> 2.37 --> 2.12 today 8/25]  - possibly due to aggressive diuresis, infection   - strict Is and Os, trend creatinine  - avoid nephrotoxic meds  - Pt had been retaining urine, now s/p indwelling steve but possible trial of void today  - Decrease diuretic dose today - Pt previous baseline creatinine 1.7, now 2.12 (improved from previous day marginally) [8/18:2.25-->2.15--> 2.21--> 2.34--> 2.21--> 2.37 --> 2.12 today 8/25]  - possibly due to aggressive diuresis, infection   - strict Is and Os, trend creatinine  - avoid nephrotoxic meds  - Pt had been retaining urine, now s/p indwelling steve but possible trial of void tomorrow

## 2017-08-26 LAB
ANION GAP SERPL CALC-SCNC: 13 MMOL/L — SIGNIFICANT CHANGE UP (ref 5–17)
BUN SERPL-MCNC: 41 MG/DL — HIGH (ref 7–23)
CALCIUM SERPL-MCNC: 8.4 MG/DL — SIGNIFICANT CHANGE UP (ref 8.4–10.5)
CHLORIDE SERPL-SCNC: 91 MMOL/L — LOW (ref 96–108)
CO2 SERPL-SCNC: 22 MMOL/L — SIGNIFICANT CHANGE UP (ref 22–31)
CREAT SERPL-MCNC: 2.19 MG/DL — HIGH (ref 0.5–1.3)
GLUCOSE SERPL-MCNC: 102 MG/DL — HIGH (ref 70–99)
HCT VFR BLD CALC: 29.8 % — LOW (ref 34.5–45)
HGB BLD-MCNC: 9.8 G/DL — LOW (ref 11.5–15.5)
LEGIONELLA AG UR QL: NEGATIVE — SIGNIFICANT CHANGE UP
MCHC RBC-ENTMCNC: 29.9 PG — SIGNIFICANT CHANGE UP (ref 27–34)
MCHC RBC-ENTMCNC: 32.7 GM/DL — SIGNIFICANT CHANGE UP (ref 32–36)
MCV RBC AUTO: 91.2 FL — SIGNIFICANT CHANGE UP (ref 80–100)
OSMOLALITY SERPL: 276 MOS/KG — SIGNIFICANT CHANGE UP (ref 275–300)
PLATELET # BLD AUTO: 181 K/UL — SIGNIFICANT CHANGE UP (ref 150–400)
POTASSIUM SERPL-MCNC: 4.9 MMOL/L — SIGNIFICANT CHANGE UP (ref 3.5–5.3)
POTASSIUM SERPL-SCNC: 4.9 MMOL/L — SIGNIFICANT CHANGE UP (ref 3.5–5.3)
RBC # BLD: 3.27 M/UL — LOW (ref 3.8–5.2)
RBC # FLD: 14.5 % — SIGNIFICANT CHANGE UP (ref 10.3–14.5)
SODIUM SERPL-SCNC: 126 MMOL/L — LOW (ref 135–145)
URATE SERPL-MCNC: 7.7 MG/DL — HIGH (ref 2.5–7)
WBC # BLD: 5.4 K/UL — SIGNIFICANT CHANGE UP (ref 3.8–10.5)
WBC # FLD AUTO: 5.4 K/UL — SIGNIFICANT CHANGE UP (ref 3.8–10.5)

## 2017-08-26 PROCEDURE — 99233 SBSQ HOSP IP/OBS HIGH 50: CPT | Mod: GC

## 2017-08-26 PROCEDURE — 99232 SBSQ HOSP IP/OBS MODERATE 35: CPT

## 2017-08-26 RX ORDER — SENNA PLUS 8.6 MG/1
2 TABLET ORAL AT BEDTIME
Qty: 0 | Refills: 0 | Status: DISCONTINUED | OUTPATIENT
Start: 2017-08-26 | End: 2017-08-29

## 2017-08-26 RX ORDER — DOCUSATE SODIUM 100 MG
100 CAPSULE ORAL THREE TIMES A DAY
Qty: 0 | Refills: 0 | Status: DISCONTINUED | OUTPATIENT
Start: 2017-08-26 | End: 2017-08-29

## 2017-08-26 RX ORDER — SODIUM CHLORIDE 9 MG/ML
1 INJECTION INTRAMUSCULAR; INTRAVENOUS; SUBCUTANEOUS DAILY
Qty: 0 | Refills: 0 | Status: DISCONTINUED | OUTPATIENT
Start: 2017-08-26 | End: 2017-08-29

## 2017-08-26 RX ADMIN — HEPARIN SODIUM 5000 UNIT(S): 5000 INJECTION INTRAVENOUS; SUBCUTANEOUS at 05:40

## 2017-08-26 RX ADMIN — ATORVASTATIN CALCIUM 80 MILLIGRAM(S): 80 TABLET, FILM COATED ORAL at 22:07

## 2017-08-26 RX ADMIN — SIMETHICONE 80 MILLIGRAM(S): 80 TABLET, CHEWABLE ORAL at 05:40

## 2017-08-26 RX ADMIN — Medication 20 MILLIGRAM(S): at 11:47

## 2017-08-26 RX ADMIN — SODIUM CHLORIDE 1 GRAM(S): 9 INJECTION INTRAMUSCULAR; INTRAVENOUS; SUBCUTANEOUS at 11:47

## 2017-08-26 RX ADMIN — SIMETHICONE 80 MILLIGRAM(S): 80 TABLET, CHEWABLE ORAL at 18:07

## 2017-08-26 RX ADMIN — Medication 81 MILLIGRAM(S): at 11:47

## 2017-08-26 RX ADMIN — Medication 100 MILLIGRAM(S): at 22:07

## 2017-08-26 RX ADMIN — CLOPIDOGREL BISULFATE 75 MILLIGRAM(S): 75 TABLET, FILM COATED ORAL at 11:47

## 2017-08-26 RX ADMIN — SENNA PLUS 2 TABLET(S): 8.6 TABLET ORAL at 22:07

## 2017-08-26 RX ADMIN — PANTOPRAZOLE SODIUM 40 MILLIGRAM(S): 20 TABLET, DELAYED RELEASE ORAL at 05:40

## 2017-08-26 RX ADMIN — HEPARIN SODIUM 5000 UNIT(S): 5000 INJECTION INTRAVENOUS; SUBCUTANEOUS at 18:07

## 2017-08-26 RX ADMIN — CEFTRIAXONE 100 GRAM(S): 500 INJECTION, POWDER, FOR SOLUTION INTRAMUSCULAR; INTRAVENOUS at 22:07

## 2017-08-26 RX ADMIN — Medication 40 MILLIGRAM(S): at 05:40

## 2017-08-26 RX ADMIN — SIMETHICONE 80 MILLIGRAM(S): 80 TABLET, CHEWABLE ORAL at 11:47

## 2017-08-26 RX ADMIN — Medication 100 MILLIGRAM(S): at 13:28

## 2017-08-26 RX ADMIN — AMIODARONE HYDROCHLORIDE 200 MILLIGRAM(S): 400 TABLET ORAL at 05:40

## 2017-08-26 NOTE — PROGRESS NOTE ADULT - PROBLEM SELECTOR PLAN 3
- Pt previous baseline creatinine 1.7, now 2.12 (improved from previous day marginally) [8/18:2.25-->2.15--> 2.21--> 2.34--> 2.21--> 2.37 --> 2.12 today 8/25]  - possibly due to aggressive diuresis, infection   - strict Is and Os, trend creatinine  - avoid nephrotoxic meds  - Pt had been retaining urine, now s/p indwelling steve but TOV today.

## 2017-08-26 NOTE — PROGRESS NOTE ADULT - PROBLEM SELECTOR PLAN 4
- Urine culture growing E. coli > 100,000 colonies from 8/18  - sensitive to ceftriaxone, currently being treated, today is day 7  - pt endorsing urinary retention/ difficulty urinating but no dysuria, is incontinent  - bladder scan w/ possible straight cath for residual  - no fever/ chills, pt not appearing toxic, blood cultures clear from 8/18

## 2017-08-26 NOTE — PROGRESS NOTE ADULT - PROBLEM SELECTOR PLAN 5
- Clinically well, very mild peripheral edema, no crackles in the lungs  - Pt comfortable, not orthopneic, saturating well on RA  - Continue Lasix

## 2017-08-26 NOTE — PROGRESS NOTE ADULT - ASSESSMENT
81 y/o F w/ hx aortic stenosis s/p AVR, CAD with multiple stents, CABG (2012), HTN, HLD, hx multiple CVA (residual b/L weakness, greater on R than L), [facility] [place] [facility] and pain, difficulty ambulating x 1 wk (baseline ambulatory w/ walker), found to have pneumonia (tx w/ azithromycin/ceftriaxone) + CHF (tx w/ Lasix 40 mg daily), began leaking troponins (trop 0.70) which raised concern for NSTEMI, was transferred for cath. Now s/p left heart cath thru R femoral artery w/ no hematoma, placed drug-eluting-stent in left main and proximal circumflex arteries, having some asymptomic bradycardia post-cath, s/p steve for urinary retention, now w/ worsening hyponatremia + NANDO, possibly 2/2 overdiuresis + SIADH.

## 2017-08-26 NOTE — PROGRESS NOTE ADULT - SUBJECTIVE AND OBJECTIVE BOX
CHIEF COMPLAINT/REASON FOR CONSULT:   Patient is a 82y old  Female who presents with a chief complaint of     BRIEF SUMMARY:  82 y.o.F - h.o. Significant HTN, HL, Intermittent Confusion, Stroke with R>L Weakness, CKD, CAD s/p CABG with multiple prior PCIs -  initially p/t [place] with LE edema and weakness with course c/b PNA, N-STEMI s/p CRISTIAN to L Main and pLCx as well as PNA, UTI, Hyponatremia  ================================================  24 HR EVENTS:  Cr stable   SR with 1 AV block   ================================================  Allergies    codeine (Unknown)    Intolerances    	    MEDICATIONS:  MEDICATIONS  (STANDING):  clopidogrel Tablet 75 milliGRAM(s) Oral daily  atorvastatin 80 milliGRAM(s) Oral at bedtime  amiodarone    Tablet 200 milliGRAM(s) Oral daily  PARoxetine 20 milliGRAM(s) Oral daily  furosemide    Tablet 40 milliGRAM(s) Oral daily  pantoprazole    Tablet 40 milliGRAM(s) Oral before breakfast  cefTRIAXone   IVPB   IV Intermittent   cefTRIAXone   IVPB 1 Gram(s) IV Intermittent every 24 hours  aspirin enteric coated 81 milliGRAM(s) Oral daily  heparin  Injectable 5000 Unit(s) SubCutaneous every 12 hours  sodium chloride 1 Gram(s) Oral daily  simethicone 80 milliGRAM(s) Chew four times a day  senna 2 Tablet(s) Oral at bedtime  docusate sodium 100 milliGRAM(s) Oral three times a day          PAST MEDICAL & SURGICAL HISTORY:  CVA (cerebral infarction)  Anxiety  HLD (hyperlipidemia)  HTN (hypertension)  Hypertension  Hyperlipidemia  Bilateral cataracts  Osteoarthritis  Myelodysplastic syndrome: diagnosed 5 years ago  H/O spinal stenosis  Aortic stenosis: severe s/p cardiac cath 9/21/12  Asthma  Myeloblastic anemia  Moe esophagus  H/O heart artery stent  CAD (coronary artery disease)  S/P total knee replacement: bilateral in 2009  H/O breast biopsy: left , years ago  S/P dilatation and curettage: years ago  CAD S/P percutaneous coronary angioplasty: 1 stent placed 2009  one stent placed 2010  rectal prolapse repaired: 20+ years ago      FAMILY HISTORY:  Family history of diabetes mellitus (Child): daughter      REVIEW OF SYSTEMS:  Constitutional: No Fever, Fatigue, Weight Changes  Eyes: No recent vision changes, eye pain  ENT: No Congestion, Ear Pain, Sore Throat  Endocrine: No Excess Sweating, Temperature Intolerance  Cardiovascular: No Chest Pain, Palpitations, SOB, Pre-syncope, Syncope  Respiratory: No Cough, Congestion, Wheezing  GI: No dysuria, hematuria  MS: No Joint Pain, Swelling  Neurologic: No Headaches, Imbalance, Focal Deficits  Skin: No Rashes, Hematoma, Prurpura    PHYSICAL EXAM:  T(C): 36.9 (08-25-17 @ 04:51), Max: 36.9 (08-25-17 @ 04:51)  HR: 63 (08-25-17 @ 04:51) (56 - 63)  BP: 149/74 (08-25-17 @ 04:51) (108/66 - 149/74)  RR: 18 (08-25-17 @ 04:51) (18 - 18)  SpO2: 91% (08-25-17 @ 04:51) (91% - 98%)  Wt(kg): --  I&O's Summary    24 Aug 2017 07:01  -  25 Aug 2017 07:00  --------------------------------------------------------  IN: 1220 mL / OUT: 1450 mL / NET: -230 mL        Appearance: Normal	  HEENT:   Normal oral mucosa, PERRL, EOMI	  Lymphatic: No lymphadenopathy  Cardiovascular: Normal S1 S2, No JVD, II/VI ALHAJI, No edema  Respiratory: Lungs clear to auscultation, no use of accessory muscles	  Psychiatry: A & O x 2-3, Mood & affect appropriate  Gastrointestinal:  Soft, Non-tender, + BS	  Skin: No rashes, No ecchymoses, No cyanosis	  Neurologic: Non-focal  Extremities: Normal range of motion, No clubbing, cyanosis or edema  Vascular: Peripheral pulses palpable 2+ bilaterally, no prominent varicosities      LABS:	  Labs Reviewed 	08-26-17    CBC Full  -  ( 25 Aug 2017 06:28 )  WBC Count : 5.6 K/uL  Hemoglobin : 10.2 g/dL  Hematocrit : 30.1 %  Platelet Count - Automated : 179 K/uL  Mean Cell Volume : 89.3 fl  Mean Cell Hemoglobin : 30.1 pg  Mean Cell Hemoglobin Concentration : 33.7 gm/dL  Auto Neutrophil # : x  Auto Lymphocyte # : x  Auto Monocyte # : x  Auto Eosinophil # : x  Auto Basophil # : x  Auto Neutrophil % : x  Auto Lymphocyte % : x  Auto Monocyte % : x  Auto Eosinophil % : x  Auto Basophil % : x    08-25    128<L>  |  90<L>  |  40<H>  ----------------------------<  100<H>  4.4   |  23  |  2.12<H>  08-24    130<L>  |  95<L>  |  39<H>  ----------------------------<  95  4.1   |  25  |  2.37<H>    Ca    8.7      25 Aug 2017 06:28  Ca    8.4      24 Aug 2017 06:30  Phos  3.4     08-24  Mg     2.2     08-24      TELEMETRY: 	    	  SR with 1st degree  =======================================================================================  =======================================================================================  Assessment:  82 y.o.F - h.o. Significant HTN, HL, Intermittent Confusion, Stroke with R>L Weakness, CKD Cr 2.1, CAD s/p CABG with multiple prior PCIs -  initially p/t [place] with LE edema and weakness with course c/b PNA, N-STEMI s/p CRISTIAN to L Main and pLCx as well as PNA, UTI, Hyponatremia    Problems:  1. CAD s/p CABG and Recent L Main pLCx Stent  2. CHF  3. CVA  4. OX2  5. Significant HTN  6. HL      Recommendations  - Continue ASA/Plavix  - Continue Lasix 40  - Continue Atorva 80  - Continue Antihypertensives  - No need to trend trops  - Tele  - ? Why on Amiodarone - AF - obtain collateral re: AC   - PT    Please call with questions.    Tha Santillan MD [facility]  [phone]

## 2017-08-26 NOTE — PROGRESS NOTE ADULT - PROBLEM SELECTOR PLAN 6
- Pt not endorsing any symptoms of chest pain, difficulty breathing, no crackles, rhonchi or wheezing on exam  - tx w/ ceftriaxone, azithromycin for 4 days (today is day 5)  - repeat CXR if lung findings  - no fever/ chills, pt not appearing toxic, blood cultures clear from 8/18  -urine legionella neg and d/c azithro.

## 2017-08-26 NOTE — PROGRESS NOTE ADULT - PROBLEM SELECTOR PLAN 1
- s/p left heart catheterization via R femoral artery 8/22 w/ placement of 2 drug eluting stents (L main and circumflex arteries)  - no chest pain or SOB post procedure  - On aspirin, plavix, statin tho holding ACEI/ BB 2/2 low BP + NANDO   - TTE w/ EF of 84 but mod-severe MR and dialted LA, hyperdynamic LV, normal RV - s/p left heart catheterization via R femoral artery 8/22 w/ placement of 2 drug eluting stents (L main and circumflex arteries)  - no chest pain or SOB post procedure  - On aspirin, plavix, statin tho holding ACEI/ BB 2/2 low BP + NANDO, monitoring Cr.  - TTE w/ EF of 84 but mod-severe MR and dialted LA, hyperdynamic LV, normal RV

## 2017-08-26 NOTE — PROGRESS NOTE ADULT - PROBLEM SELECTOR PLAN 2
- Na tending down, now 126, likely due to SIADH  - Plasma osmolality 281, urine osmolality 263, urine sodium 63 (consistent w/ SIADH though plasma osmolality not overly low)  - Pt on fluid restricted diet to 1L and started on salt tabs, as well as lasix. Consider increasing dose. - Na tending down, now 126, likely due to SIADH  - Plasma osmolality 281, urine osmolality 263, urine sodium 63 (consistent w/ SIADH though plasma osmolality not overly low)  - Pt on fluid restricted diet to 1L and on lasix. Increasing salt tabs to 1 gram BID. Call nephro consult if not improving in the next 24-48 hours. - Na tending down, now 126, likely due to SIADH  - Plasma osmolality 281, urine osmolality 263, urine sodium 63 (consistent w/ SIADH though plasma osmolality not overly low)  - Pt on fluid restricted diet to 1L and on lasix. Continuing satl tab 1gm daily. Continuing to monitor serum Na. Call nephro consult if not improving in the next 24-48 hours.

## 2017-08-26 NOTE — PROGRESS NOTE ADULT - SUBJECTIVE AND OBJECTIVE BOX
Patient is a 82y old  Female who presents with a chief complaint of lower extremity weakness/ edema.    81 y/o AA female w/ aortic stenosis (s/p AVR), CAD (s/p CABG and multiple stents), HTN, HLD, CKD, multiple past CVAs w/ residual weakness bilaterally, R>L (also contractures on RUE), who presented to [place] for lower extremity edema + weakness, found to have PNA + CHF, tx w/ ceftriaxone/ azithro + lasix 40 mg Qday, transferred to [facility] for cath after she started leaking troponins, thought to be having an NSTEMI. Now s/p L heart cath w/ CRISTIAN placed in L main and proximal circumflex arteries. Also found to have a UTI growing e coli, now w/ NANDO and hyponatremia as well.     SUBJECTIVE / OVERNIGHT EVENTS:  No acute overnight events. Patient denies any CP, SOB, N/V, abd pain. Reports being constipated.     MEDICATIONS  (STANDING):  clopidogrel Tablet 75 milliGRAM(s) Oral daily  atorvastatin 80 milliGRAM(s) Oral at bedtime  amiodarone    Tablet 200 milliGRAM(s) Oral daily  PARoxetine 20 milliGRAM(s) Oral daily  furosemide    Tablet 40 milliGRAM(s) Oral daily  pantoprazole    Tablet 40 milliGRAM(s) Oral before breakfast  cefTRIAXone   IVPB   IV Intermittent   cefTRIAXone   IVPB 1 Gram(s) IV Intermittent every 24 hours  aspirin enteric coated 81 milliGRAM(s) Oral daily  heparin  Injectable 5000 Unit(s) SubCutaneous every 12 hours  sodium chloride 1 Gram(s) Oral daily  simethicone 80 milliGRAM(s) Chew four times a day  senna 2 Tablet(s) Oral at bedtime  docusate sodium 100 milliGRAM(s) Oral three times a day    MEDICATIONS  (PRN):        Vital Signs Last 24 Hrs  T(C): 36.7 (26 Aug 2017 04:00), Max: 37.2 (25 Aug 2017 20:42)  T(F): 98 (26 Aug 2017 04:00), Max: 98.9 (25 Aug 2017 20:42)  HR: 66 (26 Aug 2017 04:00) (57 - 66)  BP: 152/64 (26 Aug 2017 04:00) (130/71 - 152/64)  BP(mean): --  RR: 18 (26 Aug 2017 04:00) (17 - 18)  SpO2: 96% (26 Aug 2017 04:00) (96% - 96%)      PHYSICAL EXAM  GENERAL: NAD, well-developed  HEAD:  Atraumatic, Normocephalic  EYES: EOMI, PERRLA, conjunctiva and sclera clear  NECK: Supple, No JVD  CHEST/LUNG: Clear to auscultation bilaterally; No wheezes or crackles  HEART: Regular rate and rhythm; III/VI holosystolic murmur, no rubs, or gallops appreciated   ABDOMEN: Soft, Nontender, Nondistended; Bowel sounds present  EXTREMITIES:  2+ Peripheral Pulses, No clubbing, cyanosis, trace edema only  PSYCH: AAOx2-3.  SKIN: No rashes or lesions.    LABS:               08-26    126<L>  |  91<L>  |  41<H>  ----------------------------<  102<H>  4.9   |  22  |  2.19<H>  08-25    126<L>  |  90<L>  |  39<H>  ----------------------------<  108<H>  4.9   |  24  |  2.15<H>  08-25    128<L>  |  90<L>  |  40<H>  ----------------------------<  100<H>  4.4   |  23  |  2.12<H>    Ca    8.4      26 Aug 2017 07:06  Ca    8.6      25 Aug 2017 18:10  Ca    8.7      25 Aug 2017 06:28                                                9.8    5.4   )-----------( 181      ( 26 Aug 2017 07:06 )             29.8                         10.2   5.6   )-----------( 179      ( 25 Aug 2017 06:28 )             30.1                         9.2    4.5   )-----------( 167      ( 24 Aug 2017 06:30 )             27.1     CAPILLARY BLOOD GLUCOSE Patient is a 82y old  Female who presents with a chief complaint of lower extremity weakness/ edema.    83 y/o AA female w/ aortic stenosis (s/p AVR), CAD (s/p CABG and multiple stents), HTN, HLD, CKD, multiple past CVAs w/ residual weakness bilaterally, R>L (also contractures on RUE), who presented to [place] for lower extremity edema + weakness, found to have PNA + CHF, tx w/ ceftriaxone/ azithro + lasix 40 mg Qday, transferred to [facility] for cath after she started leaking troponins, thought to be having an NSTEMI. Now s/p L heart cath w/ CRISTIAN placed in L main and proximal circumflex arteries. Also found to have a UTI growing e coli, now w/ NANDO and hyponatremia as well.     SUBJECTIVE / OVERNIGHT EVENTS:  No acute overnight events. Patient denies any CP, SOB, N/V, abd pain. Reports being constipated.     MEDICATIONS  (STANDING):  clopidogrel Tablet 75 milliGRAM(s) Oral daily  atorvastatin 80 milliGRAM(s) Oral at bedtime  amiodarone    Tablet 200 milliGRAM(s) Oral daily  PARoxetine 20 milliGRAM(s) Oral daily  furosemide    Tablet 40 milliGRAM(s) Oral daily  pantoprazole    Tablet 40 milliGRAM(s) Oral before breakfast  cefTRIAXone   IVPB   IV Intermittent   cefTRIAXone   IVPB 1 Gram(s) IV Intermittent every 24 hours  aspirin enteric coated 81 milliGRAM(s) Oral daily  heparin  Injectable 5000 Unit(s) SubCutaneous every 12 hours  sodium chloride 1 Gram(s) Oral daily  simethicone 80 milliGRAM(s) Chew four times a day  senna 2 Tablet(s) Oral at bedtime  docusate sodium 100 milliGRAM(s) Oral three times a day    MEDICATIONS  (PRN):        Vital Signs Last 24 Hrs  T(C): 36.7 (26 Aug 2017 04:00), Max: 37.2 (25 Aug 2017 20:42)  T(F): 98 (26 Aug 2017 04:00), Max: 98.9 (25 Aug 2017 20:42)  HR: 66 (26 Aug 2017 04:00) (57 - 66)  BP: 152/64 (26 Aug 2017 04:00) (130/71 - 152/64)  BP(mean): --  RR: 18 (26 Aug 2017 04:00) (17 - 18)  SpO2: 96% (26 Aug 2017 04:00) (96% - 96%)      PHYSICAL EXAM  GENERAL: NAD, well-developed  HEAD:  Atraumatic, Normocephalic  EYES: EOMI, PERRLA, conjunctiva and sclera clear  NECK: Supple, No JVD  CHEST/LUNG: Clear to auscultation bilaterally; No wheezes or crackles  HEART: Regular rate and rhythm; III/VI holosystolic murmur, no rubs, or gallops appreciated   ABDOMEN: Soft, Nontender, Nondistended; Bowel sounds present  EXTREMITIES:  2+ Peripheral Pulses, No clubbing, cyanosis, trace edema only  PSYCH: AAOx1-2  SKIN: No rashes or lesions.    LABS:               08-26    126<L>  |  91<L>  |  41<H>  ----------------------------<  102<H>  4.9   |  22  |  2.19<H>  08-25    126<L>  |  90<L>  |  39<H>  ----------------------------<  108<H>  4.9   |  24  |  2.15<H>  08-25    128<L>  |  90<L>  |  40<H>  ----------------------------<  100<H>  4.4   |  23  |  2.12<H>    Ca    8.4      26 Aug 2017 07:06  Ca    8.6      25 Aug 2017 18:10  Ca    8.7      25 Aug 2017 06:28                                                9.8    5.4   )-----------( 181      ( 26 Aug 2017 07:06 )             29.8                         10.2   5.6   )-----------( 179      ( 25 Aug 2017 06:28 )             30.1                         9.2    4.5   )-----------( 167      ( 24 Aug 2017 06:30 )             27.1     CAPILLARY BLOOD GLUCOSE

## 2017-08-27 LAB
ANION GAP SERPL CALC-SCNC: 10 MMOL/L — SIGNIFICANT CHANGE UP (ref 5–17)
BUN SERPL-MCNC: 42 MG/DL — HIGH (ref 7–23)
CALCIUM SERPL-MCNC: 8.5 MG/DL — SIGNIFICANT CHANGE UP (ref 8.4–10.5)
CHLORIDE SERPL-SCNC: 99 MMOL/L — SIGNIFICANT CHANGE UP (ref 96–108)
CO2 SERPL-SCNC: 24 MMOL/L — SIGNIFICANT CHANGE UP (ref 22–31)
CREAT SERPL-MCNC: 1.94 MG/DL — HIGH (ref 0.5–1.3)
GLUCOSE SERPL-MCNC: 95 MG/DL — SIGNIFICANT CHANGE UP (ref 70–99)
HCT VFR BLD CALC: 29.1 % — LOW (ref 34.5–45)
HGB BLD-MCNC: 9.5 G/DL — LOW (ref 11.5–15.5)
MCHC RBC-ENTMCNC: 29.6 PG — SIGNIFICANT CHANGE UP (ref 27–34)
MCHC RBC-ENTMCNC: 32.7 GM/DL — SIGNIFICANT CHANGE UP (ref 32–36)
MCV RBC AUTO: 90.7 FL — SIGNIFICANT CHANGE UP (ref 80–100)
PLATELET # BLD AUTO: 180 K/UL — SIGNIFICANT CHANGE UP (ref 150–400)
POTASSIUM SERPL-MCNC: 4.5 MMOL/L — SIGNIFICANT CHANGE UP (ref 3.5–5.3)
POTASSIUM SERPL-SCNC: 4.5 MMOL/L — SIGNIFICANT CHANGE UP (ref 3.5–5.3)
RBC # BLD: 3.21 M/UL — LOW (ref 3.8–5.2)
RBC # FLD: 14.2 % — SIGNIFICANT CHANGE UP (ref 10.3–14.5)
SODIUM SERPL-SCNC: 133 MMOL/L — LOW (ref 135–145)
WBC # BLD: 4.8 K/UL — SIGNIFICANT CHANGE UP (ref 3.8–10.5)
WBC # FLD AUTO: 4.8 K/UL — SIGNIFICANT CHANGE UP (ref 3.8–10.5)

## 2017-08-27 PROCEDURE — 99232 SBSQ HOSP IP/OBS MODERATE 35: CPT

## 2017-08-27 PROCEDURE — 99233 SBSQ HOSP IP/OBS HIGH 50: CPT | Mod: GC

## 2017-08-27 RX ADMIN — PANTOPRAZOLE SODIUM 40 MILLIGRAM(S): 20 TABLET, DELAYED RELEASE ORAL at 06:17

## 2017-08-27 RX ADMIN — SIMETHICONE 80 MILLIGRAM(S): 80 TABLET, CHEWABLE ORAL at 22:33

## 2017-08-27 RX ADMIN — Medication 40 MILLIGRAM(S): at 06:17

## 2017-08-27 RX ADMIN — SIMETHICONE 80 MILLIGRAM(S): 80 TABLET, CHEWABLE ORAL at 17:19

## 2017-08-27 RX ADMIN — Medication 20 MILLIGRAM(S): at 13:30

## 2017-08-27 RX ADMIN — HEPARIN SODIUM 5000 UNIT(S): 5000 INJECTION INTRAVENOUS; SUBCUTANEOUS at 17:19

## 2017-08-27 RX ADMIN — Medication 100 MILLIGRAM(S): at 22:32

## 2017-08-27 RX ADMIN — ATORVASTATIN CALCIUM 80 MILLIGRAM(S): 80 TABLET, FILM COATED ORAL at 22:32

## 2017-08-27 RX ADMIN — Medication 100 MILLIGRAM(S): at 13:30

## 2017-08-27 RX ADMIN — SIMETHICONE 80 MILLIGRAM(S): 80 TABLET, CHEWABLE ORAL at 06:18

## 2017-08-27 RX ADMIN — CLOPIDOGREL BISULFATE 75 MILLIGRAM(S): 75 TABLET, FILM COATED ORAL at 13:30

## 2017-08-27 RX ADMIN — Medication 81 MILLIGRAM(S): at 13:30

## 2017-08-27 RX ADMIN — HEPARIN SODIUM 5000 UNIT(S): 5000 INJECTION INTRAVENOUS; SUBCUTANEOUS at 06:17

## 2017-08-27 RX ADMIN — SODIUM CHLORIDE 1 GRAM(S): 9 INJECTION INTRAMUSCULAR; INTRAVENOUS; SUBCUTANEOUS at 13:31

## 2017-08-27 RX ADMIN — SIMETHICONE 80 MILLIGRAM(S): 80 TABLET, CHEWABLE ORAL at 13:31

## 2017-08-27 RX ADMIN — AMIODARONE HYDROCHLORIDE 200 MILLIGRAM(S): 400 TABLET ORAL at 06:17

## 2017-08-27 RX ADMIN — Medication 100 MILLIGRAM(S): at 06:18

## 2017-08-27 RX ADMIN — SENNA PLUS 2 TABLET(S): 8.6 TABLET ORAL at 22:33

## 2017-08-27 NOTE — PROGRESS NOTE ADULT - PROBLEM SELECTOR PLAN 6
- Pt not endorsing any symptoms of chest pain, difficulty breathing, no crackles, rhonchi or wheezing on exam  - treated w/ ceftriaxone, azithromycin   - repeat CXR if lung findings  - no fever/ chills, pt not appearing toxic, blood cultures clear from 8/18  -urine legionella neg so d/c dyllan.

## 2017-08-27 NOTE — PROGRESS NOTE ADULT - SUBJECTIVE AND OBJECTIVE BOX
Patient is a 82y old  Female who presents with a chief complaint of high troponins.           SUBJECTIVE / OVERNIGHT EVENTS:        MEDICATIONS  (STANDING):  clopidogrel Tablet 75 milliGRAM(s) Oral daily  atorvastatin 80 milliGRAM(s) Oral at bedtime  amiodarone    Tablet 200 milliGRAM(s) Oral daily  PARoxetine 20 milliGRAM(s) Oral daily  furosemide    Tablet 40 milliGRAM(s) Oral daily  pantoprazole    Tablet 40 milliGRAM(s) Oral before breakfast  cefTRIAXone   IVPB   IV Intermittent   cefTRIAXone   IVPB 1 Gram(s) IV Intermittent every 24 hours  aspirin enteric coated 81 milliGRAM(s) Oral daily  heparin  Injectable 5000 Unit(s) SubCutaneous every 12 hours  simethicone 80 milliGRAM(s) Chew four times a day  senna 2 Tablet(s) Oral at bedtime  docusate sodium 100 milliGRAM(s) Oral three times a day  sodium chloride 1 Gram(s) Oral daily    MEDICATIONS  (PRN):        CAPILLARY BLOOD GLUCOSE        I&O's Summary    26 Aug 2017 07:01  -  27 Aug 2017 07:00  --------------------------------------------------------  IN: 460 mL / OUT: 0 mL / NET: 460 mL        PHYSICAL EXAM  GENERAL: NAD, well-developed  HEAD:  Atraumatic, Normocephalic  EYES: EOMI, PERRLA, conjunctiva and sclera clear  NECK: Supple, No JVD  CHEST/LUNG: Clear to auscultation bilaterally; No wheeze  HEART: Regular rate and rhythm; No murmurs, rubs, or gallops  ABDOMEN: Soft, Nontender, Nondistended; Bowel sounds present  EXTREMITIES:  2+ Peripheral Pulses, No clubbing, cyanosis, or edema  PSYCH: AAOx3  SKIN: No rashes or lesions    LABS:                        9.5    4.8   )-----------( 180      ( 27 Aug 2017 05:26 )             29.1     08-27    133<L>  |  99  |  42<H>  ----------------------------<  95  4.5   |  24  |  1.94<H>    Ca    8.5      27 Aug 2017 05:26                RADIOLOGY & ADDITIONAL TESTS:    Imaging Personally Reviewed:  Consultant(s) Notes Reviewed:    Care Discussed with Consultants/Other Providers: Patient is a 82y old  Female who presents with a chief complaint of high troponins.         SUBJECTIVE / OVERNIGHT EVENTS:  No acute overnight events. Pt asking where her breakfast is, says she is hungry. Pt not complaining of any chest pain or SOB. Pt also says she has to pee, but is incontinent (currently needs to be changed).       MEDICATIONS  (STANDING):  clopidogrel Tablet 75 milliGRAM(s) Oral daily  atorvastatin 80 milliGRAM(s) Oral at bedtime  amiodarone    Tablet 200 milliGRAM(s) Oral daily  PARoxetine 20 milliGRAM(s) Oral daily  furosemide    Tablet 40 milliGRAM(s) Oral daily  pantoprazole    Tablet 40 milliGRAM(s) Oral before breakfast  cefTRIAXone   IVPB   IV Intermittent   cefTRIAXone   IVPB 1 Gram(s) IV Intermittent every 24 hours  aspirin enteric coated 81 milliGRAM(s) Oral daily  heparin  Injectable 5000 Unit(s) SubCutaneous every 12 hours  simethicone 80 milliGRAM(s) Chew four times a day  senna 2 Tablet(s) Oral at bedtime  docusate sodium 100 milliGRAM(s) Oral three times a day  sodium chloride 1 Gram(s) Oral daily    Vital Signs Last 24 Hrs  T(C): 36.6 (27 Aug 2017 04:39), Max: 36.9 (26 Aug 2017 13:16)  T(F): 97.8 (27 Aug 2017 04:39), Max: 98.4 (26 Aug 2017 13:16)  HR: 64 (27 Aug 2017 04:39) (59 - 64)  BP: 140/80 (27 Aug 2017 04:39) (121/68 - 140/80)  RR: 18 (27 Aug 2017 04:39) (18 - 18)  SpO2: 97% (27 Aug 2017 04:39) (96% - 98%)    I&O's Summary    26 Aug 2017 07:01  -  27 Aug 2017 07:00  --------------------------------------------------------  IN: 460 mL / OUT: 0 mL / NET: 460 mL        PHYSICAL EXAM  GENERAL: NAD, well-developed  HEAD:  Atraumatic, Normocephalic, no teeth  EYES: EOMI, PERRLA, conjunctiva and sclera clear  NECK: Supple, No JVD  CHEST/LUNG: Clear to auscultation bilaterally; No wheeze  HEART: Regular rate and rhythm; No murmurs, rubs, or gallops  ABDOMEN: Soft, Nontender, Nondistended; Bowel sounds present  EXTREMITIES:  2+ Peripheral Pulses, No clubbing, cyanosis, or edema  PSYCH: AAOx3  SKIN: No rashes or lesions    LABS:                        9.5    4.8   )-----------( 180      ( 27 Aug 2017 05:26 )             29.1     08-27    133<L>  |  99  |  42<H>  ----------------------------<  95  4.5   |  24  |  1.94<H>    Ca    8.5      27 Aug 2017 05:26                RADIOLOGY & ADDITIONAL TESTS:    Imaging Personally Reviewed:  Consultant(s) Notes Reviewed:    Care Discussed with Consultants/Other Providers: Patient is a 82y old  Female who presents with a chief complaint of high troponins.         SUBJECTIVE / OVERNIGHT EVENTS:  No acute overnight events. Pt asking where her breakfast is, says she is hungry. Pt not complaining of any chest pain or SOB. Pt also says she has to pee, but is incontinent (currently needs to be changed).       MEDICATIONS  (STANDING):  clopidogrel Tablet 75 milliGRAM(s) Oral daily  atorvastatin 80 milliGRAM(s) Oral at bedtime  amiodarone    Tablet 200 milliGRAM(s) Oral daily  PARoxetine 20 milliGRAM(s) Oral daily  furosemide    Tablet 40 milliGRAM(s) Oral daily  pantoprazole    Tablet 40 milliGRAM(s) Oral before breakfast  cefTRIAXone   IVPB   IV Intermittent   cefTRIAXone   IVPB 1 Gram(s) IV Intermittent every 24 hours  aspirin enteric coated 81 milliGRAM(s) Oral daily  heparin  Injectable 5000 Unit(s) SubCutaneous every 12 hours  simethicone 80 milliGRAM(s) Chew four times a day  senna 2 Tablet(s) Oral at bedtime  docusate sodium 100 milliGRAM(s) Oral three times a day  sodium chloride 1 Gram(s) Oral daily    Vital Signs Last 24 Hrs  T(C): 36.6 (27 Aug 2017 04:39), Max: 36.9 (26 Aug 2017 13:16)  T(F): 97.8 (27 Aug 2017 04:39), Max: 98.4 (26 Aug 2017 13:16)  HR: 64 (27 Aug 2017 04:39) (59 - 64)  BP: 140/80 (27 Aug 2017 04:39) (121/68 - 140/80)  RR: 18 (27 Aug 2017 04:39) (18 - 18)  SpO2: 97% (27 Aug 2017 04:39) (96% - 98%)    I&O's Summary    26 Aug 2017 07:01  -  27 Aug 2017 07:00  --------------------------------------------------------  IN: 460 mL / OUT: 0 mL / NET: 460 mL        PHYSICAL EXAM  GENERAL: NAD, well-developed  HEAD:  Atraumatic, Normocephalic, no teeth  EYES: EOMI, PERRLA, conjunctiva and sclera clear  NECK: Supple, No JVD  CHEST/LUNG: Clear to auscultation bilaterally; No wheeze  HEART: Regular rate and rhythm; No murmurs, rubs, or gallops  ABDOMEN: Soft, Nontender, Nondistended; Bowel sounds present  EXTREMITIES:  2+ Peripheral Pulses, No clubbing, cyanosis, or edema  PSYCH: AAOx2-3  SKIN: No rashes or lesions    LABS:                        9.5    4.8   )-----------( 180      ( 27 Aug 2017 05:26 )             29.1     08-27    133<L>  |  99  |  42<H>  ----------------------------<  95  4.5   |  24  |  1.94<H>    Ca    8.5      27 Aug 2017 05:26                RADIOLOGY & ADDITIONAL TESTS:    Imaging Personally Reviewed:  Consultant(s) Notes Reviewed:    Care Discussed with Consultants/Other Providers:

## 2017-08-27 NOTE — PROGRESS NOTE ADULT - PROBLEM SELECTOR PLAN 9
Blood pressure currently on the lower side,121/68 - 140/80, holding beta blocker and ACEI  - monitor BP  - add back BB, ACEI as tolerated (BB being held due to pt's low HR, ACEI due to NANDO)

## 2017-08-27 NOTE — PROGRESS NOTE ADULT - SUBJECTIVE AND OBJECTIVE BOX
CHIEF COMPLAINT/REASON FOR CONSULT:   Patient is a 82y old  Female who presents with a chief complaint of     BRIEF SUMMARY:  82 y.o.F - h.o. Significant HTN, HL, Intermittent Confusion, Stroke with R>L Weakness, CKD, CAD s/p CABG with multiple prior PCIs -  initially p/t [place] with LE edema and weakness with course c/b PNA, N-STEMI s/p CRISTIAN to L Main and pLCx as well as PNA, UTI, Hyponatremia  ================================================  24 HR EVENTS:  Cr stable   SR with 1 AV block 60s-70s  ================================================  Allergies    codeine (Unknown)    Intolerances    	    MEDICATIONS:  MEDICATIONS  (STANDING):  clopidogrel Tablet 75 milliGRAM(s) Oral daily  atorvastatin 80 milliGRAM(s) Oral at bedtime  amiodarone    Tablet 200 milliGRAM(s) Oral daily  PARoxetine 20 milliGRAM(s) Oral daily  furosemide    Tablet 40 milliGRAM(s) Oral daily  pantoprazole    Tablet 40 milliGRAM(s) Oral before breakfast  cefTRIAXone   IVPB   IV Intermittent   cefTRIAXone   IVPB 1 Gram(s) IV Intermittent every 24 hours  aspirin enteric coated 81 milliGRAM(s) Oral daily  heparin  Injectable 5000 Unit(s) SubCutaneous every 12 hours  sodium chloride 1 Gram(s) Oral daily  simethicone 80 milliGRAM(s) Chew four times a day  senna 2 Tablet(s) Oral at bedtime  docusate sodium 100 milliGRAM(s) Oral three times a day          PAST MEDICAL & SURGICAL HISTORY:  CVA (cerebral infarction)  Anxiety  HLD (hyperlipidemia)  HTN (hypertension)  Hypertension  Hyperlipidemia  Bilateral cataracts  Osteoarthritis  Myelodysplastic syndrome: diagnosed 5 years ago  H/O spinal stenosis  Aortic stenosis: severe s/p cardiac cath 9/21/12  Asthma  Myeloblastic anemia  Moe esophagus  H/O heart artery stent  CAD (coronary artery disease)  S/P total knee replacement: bilateral in 2009  H/O breast biopsy: left , years ago  S/P dilatation and curettage: years ago  CAD S/P percutaneous coronary angioplasty: 1 stent placed 2009  one stent placed 2010  rectal prolapse repaired: 20+ years ago      FAMILY HISTORY:  Family history of diabetes mellitus (Child): daughter      REVIEW OF SYSTEMS:  Constitutional: No Fever, Fatigue, Weight Changes  Eyes: No recent vision changes, eye pain  ENT: No Congestion, Ear Pain, Sore Throat  Endocrine: No Excess Sweating, Temperature Intolerance  Cardiovascular: No Chest Pain, Palpitations, SOB, Pre-syncope, Syncope  Respiratory: No Cough, Congestion, Wheezing  GI: No dysuria, hematuria  MS: No Joint Pain, Swelling  Neurologic: No Headaches, Imbalance, Focal Deficits  Skin: No Rashes, Hematoma, Prurpura    PHYSICAL EXAM:  Vital Signs Last 24 Hrs  T(C): 36.6 (27 Aug 2017 04:39), Max: 36.9 (26 Aug 2017 13:16)  T(F): 97.8 (27 Aug 2017 04:39), Max: 98.4 (26 Aug 2017 13:16)  HR: 64 (27 Aug 2017 04:39) (59 - 64)  BP: 140/80 (27 Aug 2017 04:39) (121/68 - 140/80)  BP(mean): --  RR: 18 (27 Aug 2017 04:39) (18 - 18)  SpO2: 97% (27 Aug 2017 04:39) (96% - 98%)    Appearance: Normal	  HEENT:   Normal oral mucosa, PERRL, EOMI	  Lymphatic: No lymphadenopathy  Cardiovascular: Normal S1 S2, No JVD, II/VI ALHAJI, No edema  Respiratory: Lungs clear to auscultation, no use of accessory muscles	  Psychiatry: A & O x 2-3, Mood & affect appropriate  Gastrointestinal:  Soft, Non-tender, + BS	  Skin: No rashes, No ecchymoses, No cyanosis	  Neurologic: Non-focal  Extremities: Normal range of motion, No clubbing, cyanosis or edema  Vascular: Peripheral pulses palpable 2+ bilaterally, no prominent varicosities      LABS:	  Labs Reviewed 	08-27-17    CBC Full  -  ( 25 Aug 2017 06:28 )  WBC Count : 5.6 K/uL  Hemoglobin : 10.2 g/dL  Hematocrit : 30.1 %  Platelet Count - Automated : 179 K/uL  Mean Cell Volume : 89.3 fl  Mean Cell Hemoglobin : 30.1 pg  Mean Cell Hemoglobin Concentration : 33.7 gm/dL  Auto Neutrophil # : x  Auto Lymphocyte # : x  Auto Monocyte # : x  Auto Eosinophil # : x  Auto Basophil # : x  Auto Neutrophil % : x  Auto Lymphocyte % : x  Auto Monocyte % : x  Auto Eosinophil % : x  Auto Basophil % : x    08-25    128<L>  |  90<L>  |  40<H>  ----------------------------<  100<H>  4.4   |  23  |  2.12<H>  08-24    130<L>  |  95<L>  |  39<H>  ----------------------------<  95  4.1   |  25  |  2.37<H>    Ca    8.7      25 Aug 2017 06:28  Ca    8.4      24 Aug 2017 06:30  Phos  3.4     08-24  Mg     2.2     08-24      TELEMETRY: 	    	  SR with 1st degree  =======================================================================================  =======================================================================================  Assessment:  82 y.o.F - h.o. Significant HTN, HL, Intermittent Confusion, Stroke with R>L Weakness, CKD Cr 2.1, CAD s/p CABG with multiple prior PCIs -  initially p/t [place] with LE edema and weakness with course c/b PNA, N-STEMI s/p CRISTIAN to L Main and pLCx as well as PNA, UTI, Hyponatremia    Problems:  1. CAD s/p CABG and Recent L Main pLCx Stent  2. CHF  3. CVA  4. OX2  5. Significant HTN  6. HL      Recommendations  - Continue ASA/Plavix  - Continue Lasix 40  - Continue Atorva 80  - Continue Antihypertensives  - No need to trend trops  - Tele  - ? Why on Amiodarone - AF - obtain collateral re: AC   - PT    Please call with questions.    Tha Santillan MD [facility]  [phone]

## 2017-08-27 NOTE — PROGRESS NOTE ADULT - PROBLEM SELECTOR PLAN 3
- Pt previous baseline creatinine 1.7, now 2.12 (improved from previous day marginally) 2.25 (admission, 8/18)--> 2.37  (8/24)--> 1.94 today (8/27)  - possibly due to aggressive diuresis, infection   - strict Is and Os, trend creatinine  - avoid nephrotoxic meds  - Bladder scan midday to ensure pt not retaining

## 2017-08-27 NOTE — PROGRESS NOTE ADULT - PROBLEM SELECTOR PLAN 1
- s/p left heart catheterization via R femoral artery 8/22 w/ placement of 2 drug eluting stents (L main and circumflex arteries)  - no chest pain or SOB post procedure  - On aspirin, plavix, statin tho holding ACEI/ BB 2/2 low BP + NANDO, monitoring Cr.  - TTE w/ EF of 84 but mod-severe MR and dialted LA, hyperdynamic LV, normal RV

## 2017-08-27 NOTE — PROGRESS NOTE ADULT - ASSESSMENT
83 y/o F w/ hx aortic stenosis s/p AVR, CAD with multiple stents, CABG (2012), HTN, HLD, hx multiple CVA (residual b/L weakness, greater on R than L), presented initially to [facility] w/ lower extremity edema and pain, difficulty ambulating x 1 wk (baseline ambulatory w/ walker), found to have pneumonia (tx w/ azithromycin/ceftriaxone) + CHF (tx w/ Lasix 40 mg daily), began leaking troponins (trop 0.70) which raised concern for NSTEMI, was transferred for cath. Now s/p left heart cath thru R femoral artery w/ no hematoma, placed drug-eluting-stent in left main and proximal circumflex arteries, having some asymptomic bradycardia post-cath, s/p steve removal, with improving NANDO and hyponatremia.

## 2017-08-27 NOTE — PROGRESS NOTE ADULT - PROBLEM SELECTOR PLAN 2
- Na tending down, now 133, likely due to SIADH  - Plasma osmolality 276, urine osmolality 322, urine sodium 32 (Urine sodium decreasing)  - Pt on fluid restricted diet to 1L and on lasix, salt tab  - Continuing to monitor serum Na. Call nephro consult if not improving - Na tending back up, now 133, likely due to SIADH  - Plasma osmolality 276, urine osmolality 322, urine sodium 32 (Urine sodium decreasing)  - Pt on fluid restricted diet to 1L and on lasix, continuing salt tab  - Continuing to monitor serum Na. Call nephro consult if not improving

## 2017-08-27 NOTE — PROGRESS NOTE ADULT - PROBLEM SELECTOR PLAN 4
- Urine culture growing E. coli > 100,000 colonies from 8/18  - sensitive to ceftriaxone, currently being treated, today is day 7  - pt endorsing urinary retention/ difficulty urinating but no dysuria, is incontinent, steve removed yesterday   - no fever/ chills, pt not appearing toxic, blood cultures clear from 8/18

## 2017-08-28 ENCOUNTER — TRANSCRIPTION ENCOUNTER (OUTPATIENT)
Age: 82
End: 2017-08-28

## 2017-08-28 LAB
ANION GAP SERPL CALC-SCNC: 13 MMOL/L — SIGNIFICANT CHANGE UP (ref 5–17)
BUN SERPL-MCNC: 40 MG/DL — HIGH (ref 7–23)
CALCIUM SERPL-MCNC: 8.6 MG/DL — SIGNIFICANT CHANGE UP (ref 8.4–10.5)
CHLORIDE SERPL-SCNC: 97 MMOL/L — SIGNIFICANT CHANGE UP (ref 96–108)
CO2 SERPL-SCNC: 24 MMOL/L — SIGNIFICANT CHANGE UP (ref 22–31)
CREAT SERPL-MCNC: 1.99 MG/DL — HIGH (ref 0.5–1.3)
GLUCOSE SERPL-MCNC: 92 MG/DL — SIGNIFICANT CHANGE UP (ref 70–99)
HCT VFR BLD CALC: 28.3 % — LOW (ref 34.5–45)
HGB BLD-MCNC: 9.6 G/DL — LOW (ref 11.5–15.5)
MAGNESIUM SERPL-MCNC: 2.2 MG/DL — SIGNIFICANT CHANGE UP (ref 1.6–2.6)
MCHC RBC-ENTMCNC: 30.3 PG — SIGNIFICANT CHANGE UP (ref 27–34)
MCHC RBC-ENTMCNC: 33.7 GM/DL — SIGNIFICANT CHANGE UP (ref 32–36)
MCV RBC AUTO: 89.9 FL — SIGNIFICANT CHANGE UP (ref 80–100)
PHOSPHATE SERPL-MCNC: 3.1 MG/DL — SIGNIFICANT CHANGE UP (ref 2.5–4.5)
PLATELET # BLD AUTO: 167 K/UL — SIGNIFICANT CHANGE UP (ref 150–400)
POTASSIUM SERPL-MCNC: 4.5 MMOL/L — SIGNIFICANT CHANGE UP (ref 3.5–5.3)
POTASSIUM SERPL-SCNC: 4.5 MMOL/L — SIGNIFICANT CHANGE UP (ref 3.5–5.3)
RBC # BLD: 3.15 M/UL — LOW (ref 3.8–5.2)
RBC # FLD: 14.6 % — HIGH (ref 10.3–14.5)
SODIUM SERPL-SCNC: 134 MMOL/L — LOW (ref 135–145)
WBC # BLD: 5.3 K/UL — SIGNIFICANT CHANGE UP (ref 3.8–10.5)
WBC # FLD AUTO: 5.3 K/UL — SIGNIFICANT CHANGE UP (ref 3.8–10.5)

## 2017-08-28 PROCEDURE — 99233 SBSQ HOSP IP/OBS HIGH 50: CPT | Mod: GC

## 2017-08-28 PROCEDURE — 99232 SBSQ HOSP IP/OBS MODERATE 35: CPT

## 2017-08-28 RX ADMIN — Medication 40 MILLIGRAM(S): at 06:14

## 2017-08-28 RX ADMIN — HEPARIN SODIUM 5000 UNIT(S): 5000 INJECTION INTRAVENOUS; SUBCUTANEOUS at 18:30

## 2017-08-28 RX ADMIN — CLOPIDOGREL BISULFATE 75 MILLIGRAM(S): 75 TABLET, FILM COATED ORAL at 14:06

## 2017-08-28 RX ADMIN — HEPARIN SODIUM 5000 UNIT(S): 5000 INJECTION INTRAVENOUS; SUBCUTANEOUS at 06:14

## 2017-08-28 RX ADMIN — AMIODARONE HYDROCHLORIDE 200 MILLIGRAM(S): 400 TABLET ORAL at 06:13

## 2017-08-28 RX ADMIN — PANTOPRAZOLE SODIUM 40 MILLIGRAM(S): 20 TABLET, DELAYED RELEASE ORAL at 06:13

## 2017-08-28 RX ADMIN — SODIUM CHLORIDE 1 GRAM(S): 9 INJECTION INTRAMUSCULAR; INTRAVENOUS; SUBCUTANEOUS at 14:06

## 2017-08-28 RX ADMIN — Medication 100 MILLIGRAM(S): at 22:37

## 2017-08-28 RX ADMIN — Medication 20 MILLIGRAM(S): at 14:06

## 2017-08-28 RX ADMIN — SENNA PLUS 2 TABLET(S): 8.6 TABLET ORAL at 22:37

## 2017-08-28 RX ADMIN — SIMETHICONE 80 MILLIGRAM(S): 80 TABLET, CHEWABLE ORAL at 18:30

## 2017-08-28 RX ADMIN — Medication 81 MILLIGRAM(S): at 14:06

## 2017-08-28 RX ADMIN — SIMETHICONE 80 MILLIGRAM(S): 80 TABLET, CHEWABLE ORAL at 14:06

## 2017-08-28 RX ADMIN — Medication 100 MILLIGRAM(S): at 06:13

## 2017-08-28 RX ADMIN — SIMETHICONE 80 MILLIGRAM(S): 80 TABLET, CHEWABLE ORAL at 22:40

## 2017-08-28 RX ADMIN — SIMETHICONE 80 MILLIGRAM(S): 80 TABLET, CHEWABLE ORAL at 06:14

## 2017-08-28 RX ADMIN — Medication 100 MILLIGRAM(S): at 14:06

## 2017-08-28 RX ADMIN — ATORVASTATIN CALCIUM 80 MILLIGRAM(S): 80 TABLET, FILM COATED ORAL at 22:37

## 2017-08-28 NOTE — PROGRESS NOTE ADULT - SUBJECTIVE AND OBJECTIVE BOX
Patient is a 82y old  Female who presents with a chief complaint of       SUBJECTIVE / OVERNIGHT EVENTS:  No acute overnight events. Pt with no complaints at this time.     MEDICATIONS  (STANDING):  clopidogrel Tablet 75 milliGRAM(s) Oral daily  atorvastatin 80 milliGRAM(s) Oral at bedtime  amiodarone    Tablet 200 milliGRAM(s) Oral daily  PARoxetine 20 milliGRAM(s) Oral daily  furosemide    Tablet 40 milliGRAM(s) Oral daily  pantoprazole    Tablet 40 milliGRAM(s) Oral before breakfast  aspirin enteric coated 81 milliGRAM(s) Oral daily  heparin  Injectable 5000 Unit(s) SubCutaneous every 12 hours  simethicone 80 milliGRAM(s) Chew four times a day  senna 2 Tablet(s) Oral at bedtime  docusate sodium 100 milliGRAM(s) Oral three times a day  sodium chloride 1 Gram(s) Oral daily    MEDICATIONS  (PRN):      Vital Signs Last 24 Hrs  T(C): 36.8 (28 Aug 2017 04:31), Max: 36.8 (27 Aug 2017 21:51)  T(F): 98.2 (28 Aug 2017 04:31), Max: 98.2 (27 Aug 2017 21:51)  HR: 64 (28 Aug 2017 04:31) (64 - 67)  BP: 123/58 (28 Aug 2017 04:31) (123/58 - 138/65)  BP(mean): --  RR: 18 (28 Aug 2017 04:31) (18 - 18)  SpO2: 96% (28 Aug 2017 04:31) (96% - 98%)      CAPILLARY BLOOD GLUCOSE        I&O's Summary    27 Aug 2017 07:01  -  28 Aug 2017 07:00  --------------------------------------------------------  IN: 840 mL / OUT: 0 mL / NET: 840 mL        PHYSICAL EXAM  GENERAL: NAD, well-developed  HEAD:  Atraumatic, Normocephalic  EYES: EOMI, PERRLA, conjunctiva and sclera clear  NECK: Supple, No JVD  CHEST/LUNG: Clear to auscultation bilaterally; No wheeze  HEART: Regular rate and rhythm; No murmurs, rubs, or gallops  ABDOMEN: Soft, Nontender, Nondistended; Bowel sounds present  EXTREMITIES:  2+ Peripheral Pulses, No clubbing, cyanosis, or edema  PSYCH: AAOx3  SKIN: No rashes or lesions    LABS:                        9.6    5.3   )-----------( 167      ( 28 Aug 2017 06:40 )             28.3     08-28    134<L>  |  97  |  40<H>  ----------------------------<  92  4.5   |  24  |  1.99<H>    Ca    8.6      28 Aug 2017 06:40  Phos  3.1     08-28  Mg     2.2     08-28                RADIOLOGY & ADDITIONAL TESTS:    Imaging Personally Reviewed:  Consultant(s) Notes Reviewed:    Care Discussed with Consultants/Other Providers: Patient is a 82y old  Female who presents with a chief complaint of       SUBJECTIVE / OVERNIGHT EVENTS:  No acute overnight events. Pt with no complaints at this time. Denies chest pain and shortness of breath, again complaining of having to pee, is incontinent and is found to be wet.     MEDICATIONS  (STANDING):  clopidogrel Tablet 75 milliGRAM(s) Oral daily  atorvastatin 80 milliGRAM(s) Oral at bedtime  amiodarone    Tablet 200 milliGRAM(s) Oral daily  PARoxetine 20 milliGRAM(s) Oral daily  furosemide    Tablet 40 milliGRAM(s) Oral daily  pantoprazole    Tablet 40 milliGRAM(s) Oral before breakfast  aspirin enteric coated 81 milliGRAM(s) Oral daily  heparin  Injectable 5000 Unit(s) SubCutaneous every 12 hours  simethicone 80 milliGRAM(s) Chew four times a day  senna 2 Tablet(s) Oral at bedtime  docusate sodium 100 milliGRAM(s) Oral three times a day  sodium chloride 1 Gram(s) Oral daily        Vital Signs Last 24 Hrs  T(C): 36.8 (28 Aug 2017 04:31), Max: 36.8 (27 Aug 2017 21:51)  T(F): 98.2 (28 Aug 2017 04:31), Max: 98.2 (27 Aug 2017 21:51)  HR: 64 (28 Aug 2017 04:31) (64 - 67)  BP: 123/58 (28 Aug 2017 04:31) (123/58 - 138/65)  BP(mean): --  RR: 18 (28 Aug 2017 04:31) (18 - 18)  SpO2: 96% (28 Aug 2017 04:31) (96% - 98%)      CAPILLARY BLOOD GLUCOSE      I&O's Summary    27 Aug 2017 07:01  -  28 Aug 2017 07:00  --------------------------------------------------------  IN: 840 mL / OUT: 0 mL / NET: 840 mL        PHYSICAL EXAM  GENERAL: NAD, well-developed, overweight  HEAD:  Atraumatic, Normocephalic  EYES: EOMI, PERRLA, conjunctiva and sclera clear  NECK: Supple, No JVD  CHEST/LUNG: Clear to auscultation bilaterally; No wheeze, no crackles  HEART: Regular rate and rhythm; III/VI holosystolic murmur as noted previously   ABDOMEN: Soft, Nontender, Nondistended; Bowel sounds present  EXTREMITIES:  2+ Peripheral Pulses, No clubbing, or cyanosis, trace edema on LE, R arm contracted   PSYCH: AAOx1 (to self), calm  SKIN: No rashes or lesions    LABS:                        9.6    5.3   )-----------( 167      ( 28 Aug 2017 06:40 )             28.3     08-28    134<L>  |  97  |  40<H>  ----------------------------<  92  4.5   |  24  |  1.99<H>    Ca    8.6      28 Aug 2017 06:40  Phos  3.1     08-28  Mg     2.2     08-28          RADIOLOGY & ADDITIONAL TESTS:    Imaging Personally Reviewed:   Consultant(s) Notes Reviewed:  cardiology  Care Discussed with Consultants/Other Providers: case management, nursing Patient is a 82y old  Female who presents with a chief complaint of       SUBJECTIVE / OVERNIGHT EVENTS:  No acute overnight events. Pt with no complaints at this time. Denies chest pain and shortness of breath, again complaining of having to pee, is incontinent and is found to be wet.     MEDICATIONS  (STANDING):  clopidogrel Tablet 75 milliGRAM(s) Oral daily  atorvastatin 80 milliGRAM(s) Oral at bedtime  amiodarone    Tablet 200 milliGRAM(s) Oral daily  PARoxetine 20 milliGRAM(s) Oral daily  furosemide    Tablet 40 milliGRAM(s) Oral daily  pantoprazole    Tablet 40 milliGRAM(s) Oral before breakfast  aspirin enteric coated 81 milliGRAM(s) Oral daily  heparin  Injectable 5000 Unit(s) SubCutaneous every 12 hours  simethicone 80 milliGRAM(s) Chew four times a day  senna 2 Tablet(s) Oral at bedtime  docusate sodium 100 milliGRAM(s) Oral three times a day  sodium chloride 1 Gram(s) Oral daily      Vital Signs Last 24 Hrs  T(C): 36.8 (28 Aug 2017 04:31), Max: 36.8 (27 Aug 2017 21:51)  T(F): 98.2 (28 Aug 2017 04:31), Max: 98.2 (27 Aug 2017 21:51)  HR: 64 (28 Aug 2017 04:31) (64 - 67)  BP: 123/58 (28 Aug 2017 04:31) (123/58 - 138/65)  RR: 18 (28 Aug 2017 04:31) (18 - 18)  SpO2: 96% (28 Aug 2017 04:31) (96% - 98%)      CAPILLARY BLOOD GLUCOSE      I&O's Summary    27 Aug 2017 07:01  -  28 Aug 2017 07:00  --------------------------------------------------------  IN: 840 mL / OUT: 0 mL / NET: 840 mL        PHYSICAL EXAM  GENERAL: NAD, well-developed, overweight  HEAD:  Atraumatic, Normocephalic  EYES: EOMI, PERRLA, conjunctiva and sclera clear  NECK: Supple, No JVD  CHEST/LUNG: Clear to auscultation bilaterally; No wheeze, no crackles  HEART: Regular rate and rhythm; III/VI holosystolic murmur as noted previously   ABDOMEN: Soft, Nontender, Nondistended; Bowel sounds present  EXTREMITIES:  2+ Peripheral Pulses, No clubbing, or cyanosis, trace edema on LE, R arm contracted   PSYCH: AAOx1 (to self), calm  SKIN: No rashes or lesions    LABS:                        9.6    5.3   )-----------( 167      ( 28 Aug 2017 06:40 )             28.3     08-28    134<L>  |  97  |  40<H>  ----------------------------<  92  4.5   |  24  |  1.99<H>    Ca    8.6      28 Aug 2017 06:40  Phos  3.1     08-28  Mg     2.2     08-28          RADIOLOGY & ADDITIONAL TESTS:    Imaging Personally Reviewed:   Consultant(s) Notes Reviewed:  cardiology  Care Discussed with Consultants/Other Providers: case management, nursing

## 2017-08-28 NOTE — DISCHARGE NOTE ADULT - CARE PLAN
Principal Discharge DX:	ACS (acute coronary syndrome)  Goal:	You will not experience any persistent chest pain. Your troponins will be downtrending/ stable  Instructions for follow-up, activity and diet:	You can resume your previous normal level of activity. You should ambulate as you are able and get out of bed into a chair with assistance. You should continue taking your aspirin, plavix, and amiodarone, and furosemide as directed. If you experience any persistent chest pain along w/ shortness of breath or dyspnea, you should get evaluated immediately.  Secondary Diagnosis:	Hyponatremia  Instructions for follow-up, activity and diet:	You should continue your fluid restriction to 1 L as your sodium has been low because of excessive water intake and will remain normal if you take in less fluids. You should make sure you follow-up with your primary care doctor to monitor your sodium level.  Secondary Diagnosis:	NANDO (acute kidney injury)  Instructions for follow-up, activity and diet:	Your baseline creatinine per records was about1.4-1.7 through January 2017 but increased to 2.34 in June of 2017 and 2.39 July 2017. Your discharge creatinine is 1.99 and has been stable over the past few days. You should make sure to drink enough fluids (about 1 liter) and should follow-up with your primary care doctor to monitor your kidney function. If you begin to produce significantly less urine than usual you should get evaluated immediately.  Secondary Diagnosis:	CAD (coronary artery disease)  Instructions for follow-up, activity and diet:	You should continue taking your Aspirin, Plavix, and Atorvastatin as above.  Secondary Diagnosis:	CAP (community acquired pneumonia)  Instructions for follow-up, activity and diet:	You finished your course of antibiotics in the hospital. You took Ceftriaxone and Azithromycin. You should return to your normal level of activity and ambulate as tolerated. If you experience difficulty breathing, shortness of breath, cough, or fever, you should be evaluated immediately.  Secondary Diagnosis:	UTI (urinary tract infection)  Instructions for follow-up, activity and diet:	Your antibiotics also covered your UTI. Your urine culture grew e.coli which were sensitive to everything except Ampicillin and Ampicillin/ Sulfabactam. You were treated with Ceftriaxone and Azithromycin, which also covered your pneumonia. If you begin to experience painful urination, blood in your urine, flank/ back pain, or fevers, you should be evaluated immediately.  Secondary Diagnosis:	Hyperlipidemia  Instructions for follow-up, activity and diet:	You should continue taking your statin drug as directed and should also attempt to follow a diet which is low in cholesterol. You should return to your primary doctor for your cholesterol to be monitored. Principal Discharge DX:	ACS (acute coronary syndrome)  Goal:	You will not experience any persistent chest pain. Your troponins will be downtrending/ stable  Instructions for follow-up, activity and diet:	You can resume your previous normal level of activity. You should ambulate as you are able and get out of bed into a chair with assistance. You should continue taking your aspirin, plavix, and amiodarone, and furosemide as directed. If you experience any persistent chest pain along w/ shortness of breath or dyspnea, you should get evaluated immediately.  Secondary Diagnosis:	Hyponatremia  Instructions for follow-up, activity and diet:	You should continue your fluid restriction to 1 L as your sodium has been low because of excessive water intake and will remain normal if you take in less fluids. You should make sure you follow-up with your primary care doctor to monitor your sodium level. Your discharge sodium is 135.  Secondary Diagnosis:	NANDO (acute kidney injury)  Instructions for follow-up, activity and diet:	Your baseline creatinine per records was about1.4-1.7 through January 2017 but increased to 2.34 in June of 2017 and 2.39 July 2017. Your discharge creatinine is 2.03 and has been stable over the past few days. You should make sure to drink enough fluids (about 1 liter) and should follow-up with your primary care doctor to monitor your kidney function. If you begin to produce significantly less urine than usual you should get evaluated immediately.  Secondary Diagnosis:	CAD (coronary artery disease)  Instructions for follow-up, activity and diet:	You should continue taking your Aspirin, Plavix, and Atorvastatin as above.  Secondary Diagnosis:	CAP (community acquired pneumonia)  Instructions for follow-up, activity and diet:	You finished your course of antibiotics in the hospital. You took Ceftriaxone and Azithromycin for > 7 days. You should return to your normal level of activity and ambulate as tolerated. If you experience difficulty breathing, shortness of breath, cough, or fever, you should be evaluated immediately.  Secondary Diagnosis:	UTI (urinary tract infection)  Instructions for follow-up, activity and diet:	Your antibiotics also covered your UTI. Your urine culture grew e.coli which were sensitive to everything except Ampicillin and Ampicillin/ Sulfabactam. You were treated with Ceftriaxone and Azithromycin, which also covered your pneumonia. If you begin to experience painful urination, blood in your urine, flank/ back pain, or fevers, you should be evaluated immediately.  Secondary Diagnosis:	Hyperlipidemia  Instructions for follow-up, activity and diet:	You should continue taking your statin drug as directed and should also attempt to follow a diet which is low in cholesterol. You should return to your primary doctor for your cholesterol to be monitored. Principal Discharge DX:	ACS (acute coronary syndrome)  Goal:	Continued medical mangament.  Instructions for follow-up, activity and diet:	You can resume your previous normal level of activity. You should ambulate as you are able and get out of bed into a chair with assistance. You should continue taking your aspirin, plavix, and amiodarone, and furosemide as directed. If you experience any persistent chest pain along w/ shortness of breath or dyspnea, please report to the ED. Please take your medications as directed.  Secondary Diagnosis:	Hyponatremia  Instructions for follow-up, activity and diet:	You should continue your fluid restriction to 1 L as your sodium has been low because of excessive water intake and will remain normal if you take in less fluids. You should make sure you follow-up with your primary care doctor to monitor your sodium level. Your discharge sodium is 135.If your sodium normalizes, please have your doctor discontinue your salt tabs. Please see your PMD in 1 week of discharge. Please check a BMP in 1 week of discharge.  Secondary Diagnosis:	NANDO (acute kidney injury)  Instructions for follow-up, activity and diet:	Your baseline creatinine per records was about1.4-1.7 through January 2017 but increased to 2.34 in June of 2017 and 2.39 July 2017. Your discharge creatinine is 2.03 and has been stable over the past few days. Please follow-up with your primary care doctor to monitor your kidney function in 1 week of discharge.  Secondary Diagnosis:	CAD (coronary artery disease)  Instructions for follow-up, activity and diet:	You should continue taking your Aspirin, Plavix, and Atorvastatin as above. Please speak with your cardiologist about amnidarone as outpatient.  Secondary Diagnosis:	CAP (community acquired pneumonia)  Instructions for follow-up, activity and diet:	You finished your course of antibiotics in the hospital. You took Ceftriaxone and Azithromycin for > 7 days. You should return to your normal level of activity and ambulate as tolerated. If you experience difficulty breathing, shortness of breath, cough, or fever, you should be evaluated immediately.  Secondary Diagnosis:	UTI (urinary tract infection)  Instructions for follow-up, activity and diet:	Your antibiotics also covered your UTI. Your urine culture grew e.coli which were sensitive to everything except Ampicillin and Ampicillin/ Sulfabactam. You were treated with Ceftriaxone and Azithromycin, which also covered your pneumonia. If you begin to experience painful urination, blood in your urine, flank/ back pain, or fevers, you should be evaluated immediately.  Secondary Diagnosis:	Urinary retention  Instructions for follow-up, activity and diet:	Please perform a TOV in 3 days at the rehab with ambulation. You had urinary retention s/p failed TOV on 8/29.

## 2017-08-28 NOTE — PROGRESS NOTE ADULT - PROBLEM SELECTOR PLAN 9
Blood pressure currently on the lower side,121/68 - 140/80, holding beta blocker and ACEI  - monitor BP  - add back BB, ACEI as tolerated (BB being held due to pt's low HR, ACEI due to NANDO) Blood pressure currently 123/58 - 138/65, holding beta blocker and ACEI  - monitor BP  - add back BB, ACEI as tolerated (BB being held due to pt's low HR, ACEI due to NANDO)

## 2017-08-28 NOTE — PROGRESS NOTE ADULT - PROBLEM SELECTOR PLAN 5
- Clinically well, very mild peripheral edema, no crackles in the lungs  - Pt comfortable, not orthopneic, saturating well on RA  - Continue Lasix - Clinically well, very mild peripheral edema, no crackles in the lungs  - Pt comfortable, not orthopneic, saturating well on RA  - Continue Lasix at current dose

## 2017-08-28 NOTE — PROGRESS NOTE ADULT - PROBLEM SELECTOR PLAN 7
- On aspirin, plavix, statin   - s/p 2 new drug eluting stents on 8/18  - no chest pain, dyspnea, SOB right now - On aspirin, plavix, statin   - s/p 2 new drug eluting stents on 8/18  - no chest pain, dyspnea, SOB, palpitations right now

## 2017-08-28 NOTE — DISCHARGE NOTE ADULT - SECONDARY DIAGNOSIS.
Hyponatremia NANDO (acute kidney injury) CAD (coronary artery disease) CAP (community acquired pneumonia) UTI (urinary tract infection) Hyperlipidemia Urinary retention

## 2017-08-28 NOTE — DISCHARGE NOTE ADULT - CARE PROVIDER_API CALL
Pantera Guajardo), Cardiovascular Disease; Internal Medicine  [address] [address]  Phone: [phone]  Fax: [phone]

## 2017-08-28 NOTE — DISCHARGE NOTE ADULT - HOSPITAL COURSE
Ms. 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 Her legs were swollen for several days and she was having difficulty ambulating. At [place] she was found to have pneumonia and a UTI and be in heart failure. She was treated with Ceftriaxone/ Azithromycin + Lasix 40 mg Q day. She was also found to have mildly elevated troponins (high of 0.86), so she was transferred to [facility] on 8/22 for cardiac cath w/ possible intervention as she was thought to be having an NSTEMI.    Pt arrived at [facility] on 8/22. Pt received s/p L heart cath w/ CRISTIAN placed in left main and proximal circumflex arteries. Also found to have a UTI growing e coli, now w/ NANDO and hyponatremia as well. Ms. 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 Her legs were swollen for several days and she was having difficulty ambulating. At [place] she was found to have pneumonia and a UTI and be in heart failure. She was treated with Ceftriaxone/ Azithromycin + Lasix 40 mg Q day. She was also found to have mildly elevated troponins (high of 0.86), so she was transferred to [facility] on 8/22 for cardiac cath w/ possible intervention as she was thought to be having an NSTEMI. Pt transferred to [facility] on 8/22. Pt received s/p L heart cath w/ CRISTIAN placed in left main and proximal circumflex arteries. Also found to have a UTI growing e coli, now w/ NANDO and hyponatremia as well.     Problem 1: ACS  Pt w/ troponins of 0.086 at [place] on 8/18 (repeat 8/19 were 0.07, 0.056). Pt transferred to [facility] 8/22, recieved catheterization via right femoral artery w/ placement of drug-eluting-stents in left main and proximal circumflex arteries. Pt with some minor chest pain after procedure but no shortness of breath and pain resolved with Acetaminophen. Patient did not expereince any further chest pain, dyspnea, or shortness of breath for the remainder of her hospitalization and resumed her therapy with Aspirin, Plavix, and Atorvastatin. Pt also underwent an TTE during her hospitalization which revealed an EF of 84, moderate to severe mitral regurgitation, dilated LA, hyperdynamic LV, and normal right ventricle.       Problem: Hyponatremia  Pt admitted w/ sodium of 133 on 8/22. Pt was maintained on strict Is and Os. Sodium reached a low of 126 on 8/25 and 8/26. 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 It responded to fluid restriction in combination with salt tabs and was 135 at discharge. Patient's sodium should be followed closely.        Problem: NANDO (acute kidney injury)  Per outside reports, pt w/ baseline creatinine 1.6 - 1.7 in January 2017, but creatinine was 2.34 on 8/22 at admission to [facility]. Injury was likely due to aggressive diuresis or infection and discharge creatinine was 2.03. - strict Is and Os, trend creatinine. Patient also appeared to be chronically retaining urine and frequently complained of having to pee despite being incontinent. This was likely overflow incontinence as urine residuals were found to be high. An indwelling steve catheter ws inserted w/ good output. Pt failed a trial of void and the steve was re-inserted 8/28.       Problem: UTI (urinary tract infection)  Patient with mild positive UA at [place]. Patient's urine cultures from 8/18 grew E. coli > 100,000 colonies, which were sensitive for Ceftriaxone. Patient was treated for >7 days. As above, pt had been endorsing urinary retention/ difficulty urinating but did not complain of any dysuria. As above, her steve was inserted initially on on 8/24, removed AM 8/27, and re-inserted 8/28 after failed trial of void. Patient did not experience any fever, chills, or dysuria during hospitalization.       Problem: Congestive heart failure  Patient was diuresed with 40 mg Lasix daily at [place] and was clinically euvolemic at [facility], with only trace peripheral edema, no SOB, crackles, or orthopnea. Patient was maintained on 40 mg of Furosemide daily. She continued to breath comfortably with good oxygen saturation on room air.      Problem: CAP (community acquired pneumonia).  Patient found to have pneumonia at [place], began treatment with Ceftriaxone and Azithromycin which she continued until 8/27 and 8/26 respectively. She was not endorsing any symptoms of fever, chills, chest pain, cough, or difficulty breathing and had no crackles, rhonchi or wheezing on exam throughout her hospitalization. Additionally, her blood cultures from 8/18 did not grow any microorganisms. Her urine legionella antigen was negative.  Pt was breathing well and did not appear toxic on discharge.     Problem: HTN (hypertension).  Patient's blood pressure was initially a little low after her stents were placed. She was also experiencing sinus bradycardia into the low 50s. Pt recieved her amiodarone and lasix but her beta blocker was held due to her low blood pressure and heart rate. She had not been on an ACE inhibitor prior to hospitalization due to poor kidney function and it was held during her hospitalization for the same reason.    On 8/29/17 pt appeared clinically well, nontoxic, with no cardiac symptoms, and a stable sodium and creatinine. She was deemed stable for discharge to sub-acute rehab. She was discharged with her indwelling steve catheter (placed 8/28) still in place after her failed trial of void. Ms. 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 Her legs were swollen for several days and she was having difficulty ambulating. At [place] she was found to have pneumonia and a UTI and be in heart failure. She was treated with Ceftriaxone/ Azithromycin + Lasix 40 mg Q day. She was also found to have mildly elevated troponins (high of 0.86), so she was transferred to [facility] on 8/22 for cardiac cath w/ possible intervention as she was thought to be having an NSTEMI. Pt transferred to [facility] on 8/22. Pt received s/p L heart cath w/ CRISTIAN placed in left main and proximal circumflex arteries. Also found to have a UTI growing e coli, now w/ NANDO and hyponatremia as well.     Problem 1: ACS  Pt w/ troponins of 0.086 at [place] on 8/18 (repeat 8/19 were 0.07, 0.056). Pt transferred to [facility] 8/22, recieved catheterization via right femoral artery w/ placement of drug-eluting-stents in left main and proximal circumflex arteries. Pt with some minor chest pain after procedure but no shortness of breath and pain resolved with Acetaminophen. Patient did not expereince any further chest pain, dyspnea, or shortness of breath for the remainder of her hospitalization and resumed her therapy with Aspirin, Plavix, and Atorvastatin. Pt also underwent an TTE during her hospitalization which revealed an EF of 84, moderate to severe mitral regurgitation, dilated LA, hyperdynamic LV, and normal right ventricle. The patient was monitored on tele, no acute events post stents. 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       Problem: Hyponatremia  Pt admitted w/ sodium of 133 on 8/22. Pt was maintained on strict Is and Os. Sodium reached a low of 126 on 8/25 and 8/26. 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 It responded to fluid restriction in combination with salt tabs and was 135 at discharge. Patient's sodium should be followed closely.        Problem: NANDO (acute kidney injury)  Per outside reports, pt w/ baseline creatinine 1.6 - 1.7 in January 2017, but creatinine was 2.34 on 8/22 at admission to [facility]. Injury was likely due to aggressive diuresis or infection and discharge creatinine was 2.03. - strict Is and Os, trend creatinine. Patient also appeared to be chronically retaining urine and frequently complained of having to pee despite being incontinent. This was likely overflow incontinence as urine residuals were found to be high. An indwelling steve catheter ws inserted w/ good output. Pt failed a trial of void and the steve was re-inserted 8/28. She will need repeat TOV at rehab in several days.       Problem: UTI (urinary tract infection)  Patient with mild positive UA at [place]. Patient's urine cultures from 8/18 grew E. coli > 100,000 colonies, which were sensitive for Ceftriaxone. Patient was treated for >7 days. As above, pt had been endorsing urinary retention/ difficulty urinating but did not complain of any dysuria. As above, her steve was inserted initially on on 8/24, removed AM 8/27, and re-inserted 8/28 after failed trial of void. Patient did not experience any fever, chills, or dysuria during hospitalization.       Problem: Congestive heart failure  Patient was diuresed with 40 mg Lasix daily at [place] and was clinically euvolemic at [facility], with only trace peripheral edema, no SOB, crackles, or orthopnea. Patient was maintained on 40 mg of Furosemide daily. She continued to breath comfortably with good oxygen saturation on room air.      Problem: CAP (community acquired pneumonia).  Patient found to have pneumonia at [place], began treatment with Ceftriaxone and Azithromycin which she continued until 8/27 and 8/26 respectively. She was not endorsing any symptoms of fever, chills, chest pain, cough, or difficulty breathing and had no crackles, rhonchi or wheezing on exam throughout her hospitalization. Additionally, her blood cultures from 8/18 did not grow any microorganisms. Her urine legionella antigen was negative.  Pt was breathing well and did not appear toxic on discharge.     Problem: HTN (hypertension).  Patient's blood pressure was initially a little low after her stents were placed. She was also experiencing sinus bradycardia into the low 50s. Pt received her amiodarone and lasix but her beta blocker was held due to her low blood pressure and heart rate. She had not been on an ACE inhibitor prior to hospitalization due to poor kidney function and it was held during her hospitalization for the same reason.    On 8/29/17 pt appeared clinically well, nontoxic, with no cardiac symptoms, and a stable sodium and creatinine. She was deemed stable for discharge to sub-acute rehab. She was discharged with her indwelling steve catheter (placed 8/28) still in place after her failed trial of void. The patient will need to see her cardiologist/pmd for discussion about amiodarone  as outpatient. Will need repeat BMP for Na and renal function.

## 2017-08-28 NOTE — PROGRESS NOTE ADULT - PROBLEM SELECTOR PLAN 3
- Pt previous baseline creatinine 1.7, now 2.12 (improved from previous day marginally) 2.25 (admission, 8/18)--> 2.37  (8/24)--> 1.94 today (8/27)  - possibly due to aggressive diuresis, infection   - strict Is and Os, trend creatinine  - avoid nephrotoxic meds  - Bladder scan midday to ensure pt not retaining - Pt previous baseline creatinine 1.7, now 1.99 (improved from previous day marginally) 2.25 (admission, 8/18)--> 2.37  (8/24)--> 1.99 today (8/28)  - possibly due to aggressive diuresis, infection   - strict Is and Os, trend creatinine  - avoid nephrotoxic meds  - Bladder scan midday to ensure pt not retaining  - will re-insert steve if high residual urine vol

## 2017-08-28 NOTE — PROGRESS NOTE ADULT - PROBLEM SELECTOR PLAN 10
- pt on subcut  Heparin, 5000 Q12 - pt on subcut  Heparin, 5000 Q12  - DC to sub-acute rehab as soon as there is a bed (f/u case mnt)

## 2017-08-28 NOTE — DISCHARGE NOTE ADULT - MEDICATION SUMMARY - MEDICATIONS TO STOP TAKING
I will STOP taking the medications listed below when I get home from the hospital:    clonidine 0.1 mg oral tablet  -- 1 tab(s) by mouth 2 times a day hosp    valsartan 160 mg oral capsule  --  by mouth hosp & home    dilTIAZem 180 mg/24 hours oral capsule, extended release  -- 1 cap(s) by mouth once a day hosp & home

## 2017-08-28 NOTE — DISCHARGE NOTE ADULT - PATIENT PORTAL LINK FT
“You can access the [employer] Patient Portal, offered by [facility], by registering with the following website: [email or web address]”

## 2017-08-28 NOTE — PROGRESS NOTE ADULT - ASSESSMENT
83 y/o F w/ hx aortic stenosis s/p AVR, CAD with multiple stents, CABG (2012), HTN, HLD, hx multiple CVA (residual b/L weakness, greater on R than L), presented initially to [place] w/ lower extremity edema and pain, difficulty ambulating x 1 wk (baseline ambulatory w/ walker), found to have pneumonia (tx w/ azithromycin/ceftriaxone) + CHF (tx w/ Lasix 40 mg daily), began leaking troponins (trop 0.70) which raised concern for NSTEMI, was transferred for cath. Now s/p left heart cath thru R femoral artery w/ no hematoma, placed drug-eluting-stent in left main and proximal circumflex arteries, having some asymptomic bradycardia post-cath, s/p steve removal, with improving NANDO and hyponatremia. Possible DC today.

## 2017-08-28 NOTE — PROGRESS NOTE ADULT - PROBLEM SELECTOR PLAN 4
- Urine culture growing E. coli > 100,000 colonies from 8/18  - sensitive to ceftriaxone, currently being treated, today is day 7  - pt endorsing urinary retention/ difficulty urinating but no dysuria, is incontinent, steve removed yesterday   - no fever/ chills, pt not appearing toxic, blood cultures clear from 8/18 - Urine culture growing E. coli > 100,000 colonies from 8/18  - sensitive to ceftriaxone, finished abx, treated for >7 days  - pt endorsing urinary retention/ difficulty urinating but no dysuria, is incontinent  - bladder scan today, insert steve if retaining  - no fever/ chills, pt not appearing toxic, blood cultures clear from 8/18

## 2017-08-28 NOTE — PROGRESS NOTE ADULT - SUBJECTIVE AND OBJECTIVE BOX
CHIEF COMPLAINT/REASON FOR CONSULT:   Patient is a 82y old  Female who presents with a chief complaint of     BRIEF SUMMARY:  82 y.o.F - h.o. Significant HTN, HL, Intermittent Confusion, Stroke with R>L Weakness, CKD, CAD s/p CABG with multiple prior PCIs -  initially p/t [place] with LE edema and weakness with course c/b PNA, N-STEMI s/p CRISTIAN to L Main and pLCx as well as PNA, UTI, Hyponatremia  ================================================  24 HR EVENTS:  Cr stable   No sig changes  ================================================  Allergies    codeine (Unknown)    Intolerances    	    MEDICATIONS:  MEDICATIONS  (STANDING):  clopidogrel Tablet 75 milliGRAM(s) Oral daily  atorvastatin 80 milliGRAM(s) Oral at bedtime  amiodarone    Tablet 200 milliGRAM(s) Oral daily  PARoxetine 20 milliGRAM(s) Oral daily  furosemide    Tablet 40 milliGRAM(s) Oral daily  pantoprazole    Tablet 40 milliGRAM(s) Oral before breakfast  aspirin enteric coated 81 milliGRAM(s) Oral daily  heparin  Injectable 5000 Unit(s) SubCutaneous every 12 hours  simethicone 80 milliGRAM(s) Chew four times a day  senna 2 Tablet(s) Oral at bedtime  docusate sodium 100 milliGRAM(s) Oral three times a day  sodium chloride 1 Gram(s) Oral daily            PAST MEDICAL & SURGICAL HISTORY:  CVA (cerebral infarction)  Anxiety  HLD (hyperlipidemia)  HTN (hypertension)  Hypertension  Hyperlipidemia  Bilateral cataracts  Osteoarthritis  Myelodysplastic syndrome: diagnosed 5 years ago  H/O spinal stenosis  Aortic stenosis: severe s/p cardiac cath 9/21/12  Asthma  Myeloblastic anemia  Moe esophagus  H/O heart artery stent  CAD (coronary artery disease)  S/P total knee replacement: bilateral in 2009  H/O breast biopsy: left , years ago  S/P dilatation and curettage: years ago  CAD S/P percutaneous coronary angioplasty: 1 stent placed 2009  one stent placed 2010  rectal prolapse repaired: 20+ years ago      FAMILY HISTORY:  Family history of diabetes mellitus (Child): daughter      REVIEW OF SYSTEMS:  Constitutional: No Fever, Fatigue, Weight Changes  Eyes: No recent vision changes, eye pain  ENT: No Congestion, Ear Pain, Sore Throat  Endocrine: No Excess Sweating, Temperature Intolerance  Cardiovascular: No Chest Pain, Palpitations, SOB, Pre-syncope, Syncope  Respiratory: No Cough, Congestion, Wheezing  GI: No dysuria, hematuria  MS: No Joint Pain, Swelling  Neurologic: No Headaches, Imbalance, Focal Deficits  Skin: No Rashes, Hematoma, Prurpura    PHYSICAL EXAM:  Vital Signs Last 24 Hrs  T(C): 36.8 (28 Aug 2017 04:31), Max: 36.8 (27 Aug 2017 21:51)  T(F): 98.2 (28 Aug 2017 04:31), Max: 98.2 (27 Aug 2017 21:51)  HR: 64 (28 Aug 2017 04:31) (64 - 67)  BP: 123/58 (28 Aug 2017 04:31) (123/58 - 138/65)  BP(mean): --  RR: 18 (28 Aug 2017 04:31) (18 - 18)  SpO2: 96% (28 Aug 2017 04:31) (96% - 98%)    Appearance: Normal	  HEENT:   Normal oral mucosa, PERRL, EOMI	  Lymphatic: No lymphadenopathy  Cardiovascular: Normal S1 S2, No JVD, II/VI ALHAJI, No edema  Respiratory: Lungs clear to auscultation, no use of accessory muscles	  Psychiatry: A & O x 2-3, Mood & affect appropriate  Gastrointestinal:  Soft, Non-tender, + BS	  Skin: No rashes, No ecchymoses, No cyanosis	  Neurologic: Non-focal  Extremities: Normal range of motion, No clubbing, cyanosis or edema  Vascular: Peripheral pulses palpable 2+ bilaterally, no prominent varicosities      LABS:	  Labs Reviewed 	08-27-17    CBC Full  -  ( 25 Aug 2017 06:28 )  WBC Count : 5.6 K/uL  Hemoglobin : 10.2 g/dL  Hematocrit : 30.1 %  Platelet Count - Automated : 179 K/uL  Mean Cell Volume : 89.3 fl  Mean Cell Hemoglobin : 30.1 pg  Mean Cell Hemoglobin Concentration : 33.7 gm/dL  Auto Neutrophil # : x  Auto Lymphocyte # : x  Auto Monocyte # : x  Auto Eosinophil # : x  Auto Basophil # : x  Auto Neutrophil % : x  Auto Lymphocyte % : x  Auto Monocyte % : x  Auto Eosinophil % : x  Auto Basophil % : x    08-25    128<L>  |  90<L>  |  40<H>  ----------------------------<  100<H>  4.4   |  23  |  2.12<H>  08-24    130<L>  |  95<L>  |  39<H>  ----------------------------<  95  4.1   |  25  |  2.37<H>    Ca    8.7      25 Aug 2017 06:28  Ca    8.4      24 Aug 2017 06:30  Phos  3.4     08-24  Mg     2.2     08-24      TELEMETRY: 	    	  SR with 1st degree  =======================================================================================  =======================================================================================  Assessment:  82 y.o.F - h.o. Significant HTN, HL, Intermittent Confusion, Stroke with R>L Weakness, CKD Cr 2.1, CAD s/p CABG with multiple prior PCIs -  initially p/t [place] with LE edema and weakness with course c/b PNA, N-STEMI s/p CRISTIAN to L Main and pLCx as well as PNA, UTI, Hyponatremia    Problems:  1. CAD s/p CABG and Recent L Main pLCx Stent  2. CHF  3. CVA  4. OX2  5. Significant HTN  6. HL      Recommendations  - Continue ASA/Plavix  - Continue Lasix 40  - Continue Atorva 80  - Continue Antihypertensives  - No need to trend trops  - OK to D/C home  - ? Why on Amiodarone - AF - obtain collateral re: AC   - PT    Please call with questions.    Tha Santillan MD [facility]  [phone]

## 2017-08-28 NOTE — PROGRESS NOTE ADULT - PROBLEM SELECTOR PLAN 6
- Pt not endorsing any symptoms of chest pain, difficulty breathing, no crackles, rhonchi or wheezing on exam  - treated w/ ceftriaxone, azithromycin   - repeat CXR if lung findings  - no fever/ chills, pt not appearing toxic, blood cultures clear from 8/18  -urine legionella neg so d/c dyllan. - Pt not endorsing any symptoms of chest pain, difficulty breathing, no crackles, rhonchi or wheezing on exam  - treated w/ ceftriaxone, azithromycin > 7 days  - repeat CXR if lung findings  - no fever/ chills, pt not appearing toxic, blood cultures clear from 8/18  - urine legionella negative

## 2017-08-28 NOTE — DISCHARGE NOTE ADULT - ADDITIONAL INSTRUCTIONS
Please have your BMP checked in 1 week  Please see your Cardiologist, PMD in 1 week of discharge.  Please perform a TOV in 2-3 days at rehab.

## 2017-08-28 NOTE — DISCHARGE NOTE ADULT - NS AS DC AMI BB CONTRA
low heart rate, hypotension/other reasons documentated by Physician / Nurse Practitioner / Physician Assistant or Pharmacist

## 2017-08-28 NOTE — PROGRESS NOTE ADULT - PROBLEM SELECTOR PLAN 2
- Na tending back up, now 133, likely due to SIADH  - Plasma osmolality 276, urine osmolality 322, urine sodium 32 (Urine sodium decreasing)  - Pt on fluid restricted diet to 1L and on lasix, continuing salt tab  - Continuing to monitor serum Na. Call nephro consult if not improving - Na tending back up, now 134 (89 yest) likely low due to SIADH  - Plasma osmolality 276, urine osmolality 322, urine sodium 32 (Urine sodium decreasing)  - Pt on fluid restricted diet to 1L and on lasix, continuing salt tab  - Continuing to monitor serum Na. Call nephro consult if not improving

## 2017-08-28 NOTE — DISCHARGE NOTE ADULT - INSTRUCTIONS
Patient should resume her normal cholesterol-restricted diet. She should, however, be restricted to 1 L of fluid due to her SIADH.

## 2017-08-28 NOTE — DISCHARGE NOTE ADULT - MEDICATION SUMMARY - MEDICATIONS TO CHANGE
I will SWITCH the dose or number of times a day I take the medications listed below when I get home from the hospital:    aspirin 325 mg oral delayed release tablet  -- 1 tab(s) by mouth once a day hosp & home    atorvastatin 20 mg oral tablet  -- 1 tab(s) by mouth once a day home

## 2017-08-28 NOTE — DISCHARGE NOTE ADULT - MEDICATION SUMMARY - MEDICATIONS TO TAKE
I will START or STAY ON the medications listed below when I get home from the hospital:    aspirin 81 mg oral delayed release tablet  -- 1 tab(s) by mouth once a day  -- Indication: For CAD (coronary artery disease)    amiodarone 200 mg oral tablet  -- 1 tab(s) by mouth once a day hosp & home  -- Indication: For CAD (coronary artery disease)    Valium 5 mg oral tablet  -- 1 tab(s) by mouth once a day (at bedtime), As Needed home  -- Indication: For Anxiety    paroxetine 20 mg oral tablet  -- 1 tab(s) by mouth once a day hosp & home  -- Indication: For depression    atorvastatin 80 mg oral tablet  -- 1 tab(s) by mouth once a day (at bedtime)  -- Indication: For ACS (acute coronary syndrome)    clopidogrel 75 mg oral tablet  -- 1 tab(s) by mouth once a day home  -- Indication: For ACS (acute coronary syndrome)    SEROquel 50 mg oral tablet  -- 1 tab(s) by mouth once a day (at bedtime) home  -- Indication: For Agitation    albuterol 0.63 mg/3 mL (0.021%) inhalation solution  -- 3 milliliter(s) inhaled 3 times a day, As Needed hosp & home  -- Indication: For bronchospasms    Lasix 40 mg oral tablet  -- 1 tab(s) by mouth once a day Please restart 8/31  -- Indication: For Heart Failure    Sodium Chloride 1 g oral tablet  -- 1 tab(s) by mouth once a day  -- Indication: For SIADH    omeprazole 20 mg oral delayed release capsule  -- 1 cap(s) by mouth once a day  home  -- Indication: For GERD    oxybutynin 5 mg oral tablet  -- 1 tab(s) by mouth 3 times a day home  -- Indication: For Bladder spasm

## 2017-08-29 VITALS
HEART RATE: 67 BPM | SYSTOLIC BLOOD PRESSURE: 147 MMHG | TEMPERATURE: 98 F | RESPIRATION RATE: 18 BRPM | OXYGEN SATURATION: 95 % | DIASTOLIC BLOOD PRESSURE: 80 MMHG

## 2017-08-29 LAB
ANION GAP SERPL CALC-SCNC: 12 MMOL/L — SIGNIFICANT CHANGE UP (ref 5–17)
BUN SERPL-MCNC: 42 MG/DL — HIGH (ref 7–23)
CALCIUM SERPL-MCNC: 8.7 MG/DL — SIGNIFICANT CHANGE UP (ref 8.4–10.5)
CHLORIDE SERPL-SCNC: 99 MMOL/L — SIGNIFICANT CHANGE UP (ref 96–108)
CO2 SERPL-SCNC: 24 MMOL/L — SIGNIFICANT CHANGE UP (ref 22–31)
CREAT SERPL-MCNC: 2.03 MG/DL — HIGH (ref 0.5–1.3)
GLUCOSE SERPL-MCNC: 102 MG/DL — HIGH (ref 70–99)
HCT VFR BLD CALC: 29.2 % — LOW (ref 34.5–45)
HGB BLD-MCNC: 9.6 G/DL — LOW (ref 11.5–15.5)
MCHC RBC-ENTMCNC: 30.1 PG — SIGNIFICANT CHANGE UP (ref 27–34)
MCHC RBC-ENTMCNC: 32.8 GM/DL — SIGNIFICANT CHANGE UP (ref 32–36)
MCV RBC AUTO: 91.8 FL — SIGNIFICANT CHANGE UP (ref 80–100)
PLATELET # BLD AUTO: 188 K/UL — SIGNIFICANT CHANGE UP (ref 150–400)
POTASSIUM SERPL-MCNC: 4.8 MMOL/L — SIGNIFICANT CHANGE UP (ref 3.5–5.3)
POTASSIUM SERPL-SCNC: 4.8 MMOL/L — SIGNIFICANT CHANGE UP (ref 3.5–5.3)
RBC # BLD: 3.18 M/UL — LOW (ref 3.8–5.2)
RBC # FLD: 14.6 % — HIGH (ref 10.3–14.5)
SODIUM SERPL-SCNC: 135 MMOL/L — SIGNIFICANT CHANGE UP (ref 135–145)
WBC # BLD: 4.8 K/UL — SIGNIFICANT CHANGE UP (ref 3.8–10.5)
WBC # FLD AUTO: 4.8 K/UL — SIGNIFICANT CHANGE UP (ref 3.8–10.5)

## 2017-08-29 PROCEDURE — 85730 THROMBOPLASTIN TIME PARTIAL: CPT

## 2017-08-29 PROCEDURE — 84484 ASSAY OF TROPONIN QUANT: CPT

## 2017-08-29 PROCEDURE — 86900 BLOOD TYPING SEROLOGIC ABO: CPT

## 2017-08-29 PROCEDURE — 93454 CORONARY ARTERY ANGIO S&I: CPT | Mod: 59

## 2017-08-29 PROCEDURE — 97116 GAIT TRAINING THERAPY: CPT

## 2017-08-29 PROCEDURE — 86850 RBC ANTIBODY SCREEN: CPT

## 2017-08-29 PROCEDURE — 85027 COMPLETE CBC AUTOMATED: CPT

## 2017-08-29 PROCEDURE — 97110 THERAPEUTIC EXERCISES: CPT

## 2017-08-29 PROCEDURE — 83935 ASSAY OF URINE OSMOLALITY: CPT

## 2017-08-29 PROCEDURE — 82550 ASSAY OF CK (CPK): CPT

## 2017-08-29 PROCEDURE — 97530 THERAPEUTIC ACTIVITIES: CPT

## 2017-08-29 PROCEDURE — 80061 LIPID PANEL: CPT

## 2017-08-29 PROCEDURE — 99239 HOSP IP/OBS DSCHRG MGMT >30: CPT

## 2017-08-29 PROCEDURE — 87449 NOS EACH ORGANISM AG IA: CPT

## 2017-08-29 PROCEDURE — 80053 COMPREHEN METABOLIC PANEL: CPT

## 2017-08-29 PROCEDURE — 84550 ASSAY OF BLOOD/URIC ACID: CPT

## 2017-08-29 PROCEDURE — 93005 ELECTROCARDIOGRAM TRACING: CPT

## 2017-08-29 PROCEDURE — C1769: CPT

## 2017-08-29 PROCEDURE — 84133 ASSAY OF URINE POTASSIUM: CPT

## 2017-08-29 PROCEDURE — 82533 TOTAL CORTISOL: CPT

## 2017-08-29 PROCEDURE — 86901 BLOOD TYPING SEROLOGIC RH(D): CPT

## 2017-08-29 PROCEDURE — C1894: CPT

## 2017-08-29 PROCEDURE — 85610 PROTHROMBIN TIME: CPT

## 2017-08-29 PROCEDURE — 80048 BASIC METABOLIC PNL TOTAL CA: CPT

## 2017-08-29 PROCEDURE — 93306 TTE W/DOPPLER COMPLETE: CPT

## 2017-08-29 PROCEDURE — 97161 PT EVAL LOW COMPLEX 20 MIN: CPT

## 2017-08-29 PROCEDURE — 84300 ASSAY OF URINE SODIUM: CPT

## 2017-08-29 PROCEDURE — 82553 CREATINE MB FRACTION: CPT

## 2017-08-29 PROCEDURE — 84443 ASSAY THYROID STIM HORMONE: CPT

## 2017-08-29 PROCEDURE — C9600: CPT | Mod: LC

## 2017-08-29 PROCEDURE — 84100 ASSAY OF PHOSPHORUS: CPT

## 2017-08-29 PROCEDURE — 99232 SBSQ HOSP IP/OBS MODERATE 35: CPT

## 2017-08-29 PROCEDURE — 83036 HEMOGLOBIN GLYCOSYLATED A1C: CPT

## 2017-08-29 PROCEDURE — 83735 ASSAY OF MAGNESIUM: CPT

## 2017-08-29 PROCEDURE — C1887: CPT

## 2017-08-29 PROCEDURE — C1874: CPT

## 2017-08-29 PROCEDURE — 83930 ASSAY OF BLOOD OSMOLALITY: CPT

## 2017-08-29 PROCEDURE — C9606: CPT | Mod: LM

## 2017-08-29 PROCEDURE — 82570 ASSAY OF URINE CREATININE: CPT

## 2017-08-29 RX ORDER — ATORVASTATIN CALCIUM 80 MG/1
1 TABLET, FILM COATED ORAL
Qty: 0 | Refills: 0 | COMMUNITY
Start: 2017-08-29

## 2017-08-29 RX ORDER — DILTIAZEM HCL 120 MG
1 CAPSULE, EXT RELEASE 24 HR ORAL
Qty: 0 | Refills: 0 | COMMUNITY

## 2017-08-29 RX ORDER — ATORVASTATIN CALCIUM 80 MG/1
1 TABLET, FILM COATED ORAL
Qty: 0 | Refills: 0 | COMMUNITY

## 2017-08-29 RX ORDER — FUROSEMIDE 40 MG
1 TABLET ORAL
Qty: 0 | Refills: 0 | COMMUNITY

## 2017-08-29 RX ORDER — VALSARTAN 80 MG/1
0 TABLET ORAL
Qty: 0 | Refills: 0 | COMMUNITY

## 2017-08-29 RX ORDER — ASPIRIN/CALCIUM CARB/MAGNESIUM 324 MG
1 TABLET ORAL
Qty: 0 | Refills: 0 | COMMUNITY
Start: 2017-08-29

## 2017-08-29 RX ORDER — SODIUM CHLORIDE 9 MG/ML
1 INJECTION INTRAMUSCULAR; INTRAVENOUS; SUBCUTANEOUS
Qty: 0 | Refills: 0 | COMMUNITY
Start: 2017-08-29

## 2017-08-29 RX ADMIN — HEPARIN SODIUM 5000 UNIT(S): 5000 INJECTION INTRAVENOUS; SUBCUTANEOUS at 05:59

## 2017-08-29 RX ADMIN — SODIUM CHLORIDE 1 GRAM(S): 9 INJECTION INTRAMUSCULAR; INTRAVENOUS; SUBCUTANEOUS at 11:16

## 2017-08-29 RX ADMIN — AMIODARONE HYDROCHLORIDE 200 MILLIGRAM(S): 400 TABLET ORAL at 05:59

## 2017-08-29 RX ADMIN — CLOPIDOGREL BISULFATE 75 MILLIGRAM(S): 75 TABLET, FILM COATED ORAL at 11:16

## 2017-08-29 RX ADMIN — Medication 40 MILLIGRAM(S): at 05:59

## 2017-08-29 RX ADMIN — Medication 20 MILLIGRAM(S): at 11:16

## 2017-08-29 RX ADMIN — PANTOPRAZOLE SODIUM 40 MILLIGRAM(S): 20 TABLET, DELAYED RELEASE ORAL at 05:59

## 2017-08-29 RX ADMIN — Medication 100 MILLIGRAM(S): at 13:10

## 2017-08-29 RX ADMIN — SIMETHICONE 80 MILLIGRAM(S): 80 TABLET, CHEWABLE ORAL at 11:16

## 2017-08-29 RX ADMIN — SIMETHICONE 80 MILLIGRAM(S): 80 TABLET, CHEWABLE ORAL at 05:59

## 2017-08-29 RX ADMIN — Medication 100 MILLIGRAM(S): at 05:59

## 2017-08-29 RX ADMIN — Medication 81 MILLIGRAM(S): at 11:16

## 2017-08-29 NOTE — PROGRESS NOTE ADULT - PROBLEM SELECTOR PROBLEM 8
HLD (hyperlipidemia)

## 2017-08-29 NOTE — PROGRESS NOTE ADULT - PROBLEM SELECTOR PLAN 6
Resolved:  - Pt not endorsing any symptoms of chest pain, difficulty breathing, no crackles, rhonchi or wheezing on exam  - treated w/ ceftriaxone, azithromycin > 7 days  - repeat CXR if lung findings  - no fever/ chills, pt not appearing toxic, blood cultures clear from 8/18  - urine legionella negative

## 2017-08-29 NOTE — PROGRESS NOTE ADULT - SUBJECTIVE AND OBJECTIVE BOX
Patient is a 82y old  Female who presents with a chief complaint of elevated troponins/ cardiac cath (28 Aug 2017 15:57)    Note: Pt's outside cardiologist is Dr. Guajardo. Called his office to determine why patient is on amiodarone. Dr. Guajardo out yesterday but his associate Dr. Garibay, who was not 100% sure assumes pt had episodes of afib in the past (pt ha      SUBJECTIVE / OVERNIGHT EVENTS:  No acute overnight events. Patient is feeling well, no complaints. Pt denying chest pain, shortness of breath, no longer bothered by sensation of having to pee after steve insertion. Pt is comfortable and slept well.       MEDICATIONS  (STANDING):  clopidogrel Tablet 75 milliGRAM(s) Oral daily  atorvastatin 80 milliGRAM(s) Oral at bedtime  amiodarone    Tablet 200 milliGRAM(s) Oral daily  PARoxetine 20 milliGRAM(s) Oral daily  furosemide    Tablet 40 milliGRAM(s) Oral daily  pantoprazole    Tablet 40 milliGRAM(s) Oral before breakfast  aspirin enteric coated 81 milliGRAM(s) Oral daily  heparin  Injectable 5000 Unit(s) SubCutaneous every 12 hours  simethicone 80 milliGRAM(s) Chew four times a day  senna 2 Tablet(s) Oral at bedtime  docusate sodium 100 milliGRAM(s) Oral three times a day  sodium chloride 1 Gram(s) Oral daily      Vital Signs Last 24 Hrs  T(C): 36.7 (29 Aug 2017 04:39), Max: 37.1 (28 Aug 2017 21:32)  T(F): 98 (29 Aug 2017 04:39), Max: 98.8 (28 Aug 2017 21:32)  HR: 62 (29 Aug 2017 04:39) (61 - 62)  BP: 149/62 (29 Aug 2017 04:39) (131/64 - 149/62)  RR: 18 (29 Aug 2017 04:39) (17 - 18)  SpO2: 95% (29 Aug 2017 04:39) (95% - 97%)      I&O's Summary    28 Aug 2017 07:01  -  29 Aug 2017 07:00  --------------------------------------------------------  IN: 720 mL / OUT: 500 mL / NET: 220 mL        PHYSICAL EXAM  GENERAL: NAD, well-developed, well-appearing, overweight  HEAD:  Atraumatic, Normocephalic, no teeth   EYES: EOMI, PERRLA, conjunctiva and sclera clear  NECK: Supple, No JVD  CHEST/LUNG: Clear to auscultation bilaterally; No wheezes or crackles  HEART: Regular rate and rhythm; III/VI holosystolic murmur, no rubs or gallops  ABDOMEN: Soft, Nontender, Nondistended; Bowel sounds present  EXTREMITIES:  2+ Peripheral Pulses, No clubbing, cyanosis, or edema  PSYCH: AAOx1.5, oriented to self, knows she is in a hospital (unsure which one), pleasant, bright affect, calm  SKIN: No rashes or lesions, skin intact, warm, dry    LABS:                        9.6    4.8   )-----------( 188      ( 29 Aug 2017 07:21 )             29.2     08-29    135  |  99  |  42<H>  ----------------------------<  102<H>  4.8   |  24  |  2.03<H>    Ca    8.7      29 Aug 2017 07:21  Phos  3.1     08-28  Mg     2.2     08-28        RADIOLOGY & ADDITIONAL TESTS:    Imaging Personally Reviewed: none new  Consultant(s) Notes Reviewed:  cards  Care Discussed with Consultants/Other Providers: nursing Patient is a 82y old  Female who presents with a chief complaint of elevated troponins/ cardiac cath (28 Aug 2017 15:57)    Note: Pt's outside cardiologist is Dr. Guajardo. Called his office to determine why patient is on amiodarone. Dr. Guajardo out yesterday but his associate Dr. Garibay, who was not 100%, suggests pt had episodes of afib in the past (pt has had strokes).       SUBJECTIVE / OVERNIGHT EVENTS:  No acute overnight events. Patient is feeling well, no complaints. Pt denying chest pain, shortness of breath, no longer bothered by sensation of having to pee after steve insertion. Pt is comfortable and slept well.       MEDICATIONS  (STANDING):  clopidogrel Tablet 75 milliGRAM(s) Oral daily  atorvastatin 80 milliGRAM(s) Oral at bedtime  amiodarone    Tablet 200 milliGRAM(s) Oral daily  PARoxetine 20 milliGRAM(s) Oral daily  furosemide    Tablet 40 milliGRAM(s) Oral daily  pantoprazole    Tablet 40 milliGRAM(s) Oral before breakfast  aspirin enteric coated 81 milliGRAM(s) Oral daily  heparin  Injectable 5000 Unit(s) SubCutaneous every 12 hours  simethicone 80 milliGRAM(s) Chew four times a day  senna 2 Tablet(s) Oral at bedtime  docusate sodium 100 milliGRAM(s) Oral three times a day  sodium chloride 1 Gram(s) Oral daily      Vital Signs Last 24 Hrs  T(C): 36.7 (29 Aug 2017 04:39), Max: 37.1 (28 Aug 2017 21:32)  T(F): 98 (29 Aug 2017 04:39), Max: 98.8 (28 Aug 2017 21:32)  HR: 62 (29 Aug 2017 04:39) (61 - 62)  BP: 149/62 (29 Aug 2017 04:39) (131/64 - 149/62)  RR: 18 (29 Aug 2017 04:39) (17 - 18)  SpO2: 95% (29 Aug 2017 04:39) (95% - 97%)      I&O's Summary    28 Aug 2017 07:01  -  29 Aug 2017 07:00  --------------------------------------------------------  IN: 720 mL / OUT: 500 mL / NET: 220 mL        PHYSICAL EXAM  GENERAL: NAD, well-developed, well-appearing, overweight  HEAD:  Atraumatic, Normocephalic, no teeth   EYES: EOMI, PERRLA, conjunctiva and sclera clear  NECK: Supple, No JVD  CHEST/LUNG: Clear to auscultation bilaterally; No wheezes or crackles  HEART: Regular rate and rhythm; III/VI holosystolic murmur, no rubs or gallops  ABDOMEN: Soft, Nontender, Nondistended; Bowel sounds present  EXTREMITIES:  2+ Peripheral Pulses, No clubbing, cyanosis, or edema  PSYCH: AAOx1.5, oriented to self, knows she is in a hospital (unsure which one), pleasant, bright affect, calm  SKIN: No rashes or lesions, skin intact, warm, dry    LABS:                        9.6    4.8   )-----------( 188      ( 29 Aug 2017 07:21 )             29.2     08-29    135  |  99  |  42<H>  ----------------------------<  102<H>  4.8   |  24  |  2.03<H>    Ca    8.7      29 Aug 2017 07:21  Phos  3.1     08-28  Mg     2.2     08-28        RADIOLOGY & ADDITIONAL TESTS:    Imaging Personally Reviewed: none new  Consultant(s) Notes Reviewed:  cards  Care Discussed with Consultants/Other Providers: nursing Patient is a 82y old  Female who presents with a chief complaint of elevated troponins/ cardiac cath (28 Aug 2017 15:57)    Note: Pt's outside cardiologist is Dr. Guajardo. Called his office to determine why patient is on amiodarone. Dr. Guajardo out yesterday but his associate Dr. Garibay, who was not 100%, suggests pt had episodes of afib in the past (pt has had strokes). Will call again today to speak w/       SUBJECTIVE / OVERNIGHT EVENTS:  No acute overnight events. Patient is feeling well, no complaints. Pt denying chest pain, shortness of breath, no longer bothered by sensation of having to pee after steve insertion. Pt is comfortable and slept well.       MEDICATIONS  (STANDING):  clopidogrel Tablet 75 milliGRAM(s) Oral daily  atorvastatin 80 milliGRAM(s) Oral at bedtime  amiodarone    Tablet 200 milliGRAM(s) Oral daily  PARoxetine 20 milliGRAM(s) Oral daily  furosemide    Tablet 40 milliGRAM(s) Oral daily  pantoprazole    Tablet 40 milliGRAM(s) Oral before breakfast  aspirin enteric coated 81 milliGRAM(s) Oral daily  heparin  Injectable 5000 Unit(s) SubCutaneous every 12 hours  simethicone 80 milliGRAM(s) Chew four times a day  senna 2 Tablet(s) Oral at bedtime  docusate sodium 100 milliGRAM(s) Oral three times a day  sodium chloride 1 Gram(s) Oral daily      Vital Signs Last 24 Hrs  T(C): 36.7 (29 Aug 2017 04:39), Max: 37.1 (28 Aug 2017 21:32)  T(F): 98 (29 Aug 2017 04:39), Max: 98.8 (28 Aug 2017 21:32)  HR: 62 (29 Aug 2017 04:39) (61 - 62)  BP: 149/62 (29 Aug 2017 04:39) (131/64 - 149/62)  RR: 18 (29 Aug 2017 04:39) (17 - 18)  SpO2: 95% (29 Aug 2017 04:39) (95% - 97%)      I&O's Summary    28 Aug 2017 07:01  -  29 Aug 2017 07:00  --------------------------------------------------------  IN: 720 mL / OUT: 500 mL / NET: 220 mL        PHYSICAL EXAM  GENERAL: NAD, well-developed, well-appearing, overweight  HEAD:  Atraumatic, Normocephalic, no teeth   EYES: EOMI, PERRLA, conjunctiva and sclera clear  NECK: Supple, No JVD  CHEST/LUNG: Clear to auscultation bilaterally; No wheezes or crackles  HEART: Regular rate and rhythm; III/VI holosystolic murmur, no rubs or gallops  ABDOMEN: Soft, Nontender, Nondistended; Bowel sounds present  EXTREMITIES:  2+ Peripheral Pulses, No clubbing, cyanosis, or edema  PSYCH: AAOx1.5, oriented to self, knows she is in a hospital (unsure which one), pleasant, bright affect, calm  SKIN: No rashes or lesions, skin intact, warm, dry    LABS:                        9.6    4.8   )-----------( 188      ( 29 Aug 2017 07:21 )             29.2     08-29    135  |  99  |  42<H>  ----------------------------<  102<H>  4.8   |  24  |  2.03<H>    Ca    8.7      29 Aug 2017 07:21  Phos  3.1     08-28  Mg     2.2     08-28        RADIOLOGY & ADDITIONAL TESTS:    Imaging Personally Reviewed: none new  Consultant(s) Notes Reviewed:  cards  Care Discussed with Consultants/Other Providers: nursing

## 2017-08-29 NOTE — PROGRESS NOTE ADULT - PROBLEM SELECTOR PLAN 9
Blood pressure currently 131/64 - 149/62, holding beta blocker and ACEI  - monitor BP  - add back BB, ACEI as tolerated (BB being held due to pt's low HR, ACEI due to NANDO)

## 2017-08-29 NOTE — PROGRESS NOTE ADULT - PROBLEM SELECTOR PLAN 10
- pt on subcut  Heparin, 5000 Q12  - DC to sub-acute rehab as soon as there is a bed, possibly today

## 2017-08-29 NOTE — PROGRESS NOTE ADULT - PROBLEM SELECTOR PLAN 4
Resolved:  - Urine culture growing E. coli > 100,000 colonies from 8/18  - sensitive to ceftriaxone, finished abx, treated for >7 days  - pt endorsing urinary retention/ difficulty urinating but no dysuria, is incontinent  - s/p steve re-insertion 8/28  - no fever/ chills, pt not appearing toxic, blood cultures clear from 8/18

## 2017-08-29 NOTE — PROGRESS NOTE ADULT - SUBJECTIVE AND OBJECTIVE BOX
CHIEF COMPLAINT/REASON FOR CONSULT:   Patient is a 82y old  Female who presents with a chief complaint of     BRIEF SUMMARY:  82 y.o.F - h.o. Significant HTN, HL, Intermittent Confusion, Stroke with R>L Weakness, CKD, CAD s/p CABG with multiple prior PCIs -  initially p/t [place] with LE edema and weakness with course c/b PNA, N-STEMI s/p CRISTIAN to L Main and pLCx as well as PNA, UTI, Hyponatremia  ================================================  24 HR EVENTS:  Cr stable   No sig changes  Planning for D/C   ================================================  Allergies    codeine (Unknown)    Intolerances    	    MEDICATIONS:  MEDICATIONS  (STANDING):  clopidogrel Tablet 75 milliGRAM(s) Oral daily  atorvastatin 80 milliGRAM(s) Oral at bedtime  amiodarone    Tablet 200 milliGRAM(s) Oral daily  PARoxetine 20 milliGRAM(s) Oral daily  furosemide    Tablet 40 milliGRAM(s) Oral daily  pantoprazole    Tablet 40 milliGRAM(s) Oral before breakfast  aspirin enteric coated 81 milliGRAM(s) Oral daily  heparin  Injectable 5000 Unit(s) SubCutaneous every 12 hours  simethicone 80 milliGRAM(s) Chew four times a day  senna 2 Tablet(s) Oral at bedtime  docusate sodium 100 milliGRAM(s) Oral three times a day  sodium chloride 1 Gram(s) Oral daily            PAST MEDICAL & SURGICAL HISTORY:  CVA (cerebral infarction)  Anxiety  HLD (hyperlipidemia)  HTN (hypertension)  Hypertension  Hyperlipidemia  Bilateral cataracts  Osteoarthritis  Myelodysplastic syndrome: diagnosed 5 years ago  H/O spinal stenosis  Aortic stenosis: severe s/p cardiac cath 9/21/12  Asthma  Myeloblastic anemia  Moe esophagus  H/O heart artery stent  CAD (coronary artery disease)  S/P total knee replacement: bilateral in 2009  H/O breast biopsy: left , years ago  S/P dilatation and curettage: years ago  CAD S/P percutaneous coronary angioplasty: 1 stent placed 2009  one stent placed 2010  rectal prolapse repaired: 20+ years ago      FAMILY HISTORY:  Family history of diabetes mellitus (Child): daughter      REVIEW OF SYSTEMS:  Constitutional: No Fever, Fatigue, Weight Changes  Eyes: No recent vision changes, eye pain  ENT: No Congestion, Ear Pain, Sore Throat  Endocrine: No Excess Sweating, Temperature Intolerance  Cardiovascular: No Chest Pain, Palpitations, SOB, Pre-syncope, Syncope  Respiratory: No Cough, Congestion, Wheezing  GI: No dysuria, hematuria  MS: No Joint Pain, Swelling  Neurologic: No Headaches, Imbalance, Focal Deficits  Skin: No Rashes, Hematoma, Prurpura    PHYSICAL EXAM:  Vital Signs Last 24 Hrs  T(C): 36.7 (29 Aug 2017 04:39), Max: 37.1 (28 Aug 2017 21:32)  T(F): 98 (29 Aug 2017 04:39), Max: 98.8 (28 Aug 2017 21:32)  HR: 62 (29 Aug 2017 04:39) (61 - 62)  BP: 149/62 (29 Aug 2017 04:39) (131/64 - 149/62)  BP(mean): --  RR: 18 (29 Aug 2017 04:39) (17 - 18)  SpO2: 95% (29 Aug 2017 04:39) (95% - 97%)    Appearance: Normal	  HEENT:   Normal oral mucosa, PERRL, EOMI	  Lymphatic: No lymphadenopathy  Cardiovascular: Normal S1 S2, No JVD, II/VI ALHAJI, No edema  Respiratory: Lungs clear to auscultation, no use of accessory muscles	  Psychiatry: A & O x 2-3, Mood & affect appropriate  Gastrointestinal:  Soft, Non-tender, + BS	  Skin: No rashes, No ecchymoses, No cyanosis	  Neurologic: Non-focal  Extremities: Normal range of motion, No clubbing, cyanosis or edema  Vascular: Peripheral pulses palpable 2+ bilaterally, no prominent varicosities      LABS:	  Labs Reviewed 	08-29-17    CBC Full  -  ( 25 Aug 2017 06:28 )  WBC Count : 5.6 K/uL  Hemoglobin : 10.2 g/dL  Hematocrit : 30.1 %  Platelet Count - Automated : 179 K/uL  Mean Cell Volume : 89.3 fl  Mean Cell Hemoglobin : 30.1 pg  Mean Cell Hemoglobin Concentration : 33.7 gm/dL  Auto Neutrophil # : x  Auto Lymphocyte # : x  Auto Monocyte # : x  Auto Eosinophil # : x  Auto Basophil # : x  Auto Neutrophil % : x  Auto Lymphocyte % : x  Auto Monocyte % : x  Auto Eosinophil % : x  Auto Basophil % : x    08-25    128<L>  |  90<L>  |  40<H>  ----------------------------<  100<H>  4.4   |  23  |  2.12<H>  08-24    130<L>  |  95<L>  |  39<H>  ----------------------------<  95  4.1   |  25  |  2.37<H>    Ca    8.7      25 Aug 2017 06:28  Ca    8.4      24 Aug 2017 06:30  Phos  3.4     08-24  Mg     2.2     08-24      TELEMETRY: 	    	  SR with 1st degree  =======================================================================================  =======================================================================================  Assessment:  82 y.o.F - h.o. Significant HTN, HL, Intermittent Confusion, Stroke with R>L Weakness, CKD Cr 2.1, CAD s/p CABG with multiple prior PCIs -  initially p/t [place] with LE edema and weakness with course c/b PNA, N-STEMI s/p CRISTIAN to L Main and pLCx as well as PNA, UTI, Hyponatremia    Problems:  1. CAD s/p CABG and Recent L Main pLCx Stent  2. CHF  3. CVA  4. OX2  5. Significant HTN  6. HL      Recommendations  - Continue ASA/Plavix  - Continue Lasix 40  - Continue Atorva 80  - Continue Antihypertensives  - No need to trend trops  - OK to D/C from CV standpoint  - Appreciate primary team investigating Amiodarone question. Will need to decide on AC in conjunction with Dr. Guajardo, but can be done with outpatient appt in next few days  - PT    Please call with questions.    Tha Santillan MD [facility]  [phone]

## 2017-08-29 NOTE — PROGRESS NOTE ADULT - PROBLEM SELECTOR PLAN 1
- s/p left heart catheterization via R femoral artery 8/22 w/ placement of 2 drug eluting stents (L main and circumflex arteries)  - no chest pain or SOB   - On aspirin, plavix, statin tho holding ACEI/ BB 2/2 low HR + NANDO, monitoring Cr.  - TTE w/ EF of 84 but mod-severe MR and dialted LA, hyperdynamic LV, normal RV

## 2017-08-29 NOTE — PROGRESS NOTE ADULT - PROBLEM SELECTOR PROBLEM 1
ACS (acute coronary syndrome)
CAD (coronary artery disease)

## 2017-08-29 NOTE — PROGRESS NOTE ADULT - ATTENDING COMMENTS
Patient seen and examined by me, case discussed with resident team, agree with above HPI, exam, and A/P.
Pt seen and examined by me, case discussed with resident team, agree with above HPI, exam and A/P.
81 y/o/f  PMHx of AVR with NSTEMI status-post LM-LAD/circumflex stents. BP acceptable. No CP.   UTI with urinary retention and possible left sided on Pneumonia - continue Abx  Monitor serum sodium for hyponatremia
83 y/o/f  PMHx of AVR with NSTEMI status-post LM-LAD/circumflex stents, UTI with urinary retention s/p replacement of steve as she failed TOV. , possible left sided on Pneumonia ( s/p rx) and   hyponatremia- resolved Now patient is without CP SOB and she is afebrile.  Patient was on Amiodarone at home and follows with Dr Guajardo. Out patient follow up.  Cardiologist   input appreciated.   CKD 4 stable   d/c planning to [facility]. TOV after ambulation. Upon d/c from the rehab to follow up with Dr Guajardo. Discharge time 35 minutes.
81 y/o/f  PMHx of AVR with NSTEMI status-post LM-LAD/circumflex stents. BP acceptable. No CP.   UTI with urinary retention, possible left sided on Pneumonia,  hyponatremia.   salt tab, fluid restriction, hoping to d/c abx tomorrow, TOV.
82 year-old woman hx of AVR with NSTEMI status-post unprotected LM-LAD/circumflex stents. BP management. Discontinue calcium-channel blocker, Needs PT evaluation. Ambulate.
83 y/o/f  PMHx of AVR with NSTEMI status-post LM-LAD/circumflex stents, UTI with urinary retention s/p steve D/lizy , possible left sided on Pneumonia ( s/p rx) and   hyponatremia. Now patienmt is without CP SOB and she is afebrile. hyponatremia resolved. patient was on Amiodarone- will try to touch base with her cardiologist. Check PVR. d/c planning. T 35 minutes

## 2017-08-29 NOTE — PROGRESS NOTE ADULT - PROBLEM SELECTOR PROBLEM 4
UTI (urinary tract infection)
CAP (community acquired pneumonia)
Hyponatremia

## 2017-08-29 NOTE — PROGRESS NOTE ADULT - PROBLEM SELECTOR PLAN 3
- Pt previous baseline creatinine 1.6 - 1.7 in January 2017 per outside records, now 2.03, Trend: 2.25 (admission, 8/18)--> 2.37  (8/24)--> 2.03 today (8/29)  - possibly due to aggressive diuresis, infection, this may be patient's new baseline since June per outside records   - strict Is and Os, trend creatinine  - avoid nephrotoxic meds  - Pt appears to be chronically retaining urine, steve inserted 8/28 w/ good output (pt had large residual)

## 2017-08-29 NOTE — PROGRESS NOTE ADULT - PROBLEM SELECTOR PLAN 2
- Na tending back up and continuing to improve, now 135 (WNL), was 134 yest  - likely low due to SIADH as is responding to fluid restriction and salt tabs  - Pt on fluid restricted diet to 1L and on lasix, continuing salt tab  - Continuing to trend Na, should be monitored at rehab/ after discharge

## 2017-08-29 NOTE — PROGRESS NOTE ADULT - PROBLEM SELECTOR PROBLEM 3
NANDO (acute kidney injury)

## 2017-08-29 NOTE — PROGRESS NOTE ADULT - ASSESSMENT
83 y/o F w/ hx aortic stenosis s/p AVR, CAD with multiple stents, CABG (2012), HTN, HLD, hx multiple CVA (residual b/L weakness, greater on R than L), presented initially to [facility] w/ lower extremity edema and pain, difficulty ambulating x 1 wk (baseline ambulatory w/ walker), found to have pneumonia (tx w/ azithromycin/ceftriaxone) + CHF (tx w/ Lasix 40 mg daily), began leaking troponins (trop 0.70) which raised concern for NSTEMI, was transferred for cath. Now s/p left heart cath thru R femoral artery w/ no hematoma, placed drug-eluting-stent in left main and proximal circumflex arteries, having some asymptomic bradycardia post-cath, s/p steve removal, with stable NANDO on CKD and improving hyponatremia w/ sodium now in normal range (135). Possible DC today to rehab; pt did not leave yesterday as there were no beds at her rehab of choice.

## 2017-08-29 NOTE — PROGRESS NOTE ADULT - PROBLEM SELECTOR PLAN 7
- On aspirin, plavix, statin   - s/p 2 new drug eluting stents on 8/18  - no chest pain, dyspnea, SOB, palpitations right now

## 2017-08-29 NOTE — PROGRESS NOTE ADULT - PROBLEM SELECTOR PROBLEM 5
Congestive heart failure, unspecified congestive heart failure chronicity, unspecified congestive heart failure type
UTI (urinary tract infection)
UTI (urinary tract infection)

## 2017-08-29 NOTE — PROGRESS NOTE ADULT - PROBLEM SELECTOR PLAN 5
- Clinically well, minimal peripheral edema, no crackles in the lungs  - Pt comfortable, not orthopneic, saturating well on RA  - Continue Lasix at current dose

## 2017-08-29 NOTE — PROGRESS NOTE ADULT - PROBLEM SELECTOR PROBLEM 2
Hyponatremia
Congestive heart failure, unspecified congestive heart failure chronicity, unspecified congestive heart failure type
HTN (hypertension)

## 2017-08-29 NOTE — PROGRESS NOTE ADULT - PROBLEM SELECTOR PROBLEM 6
CAP (community acquired pneumonia)
Prophylactic measure

## 2017-08-31 ENCOUNTER — RX RENEWAL (OUTPATIENT)
Age: 82
End: 2017-08-31

## 2017-09-23 ENCOUNTER — RX RENEWAL (OUTPATIENT)
Age: 82
End: 2017-09-23

## 2017-10-07 ENCOUNTER — INPATIENT (INPATIENT)
Facility: HOSPITAL | Age: 82
LOS: 2 days | Discharge: NURSING, REHAB OR LONG-TERM CARE | End: 2017-10-10
Attending: INTERNAL MEDICINE | Admitting: INTERNAL MEDICINE
Payer: COMMERCIAL

## 2017-10-07 VITALS
WEIGHT: 139.99 LBS | HEART RATE: 64 BPM | RESPIRATION RATE: 18 BRPM | DIASTOLIC BLOOD PRESSURE: 64 MMHG | TEMPERATURE: 98 F | SYSTOLIC BLOOD PRESSURE: 136 MMHG | HEIGHT: 59 IN | OXYGEN SATURATION: 99 %

## 2017-10-07 DIAGNOSIS — I69.951 HEMIPLEGIA AND HEMIPARESIS FOLLOWING UNSPECIFIED CEREBROVASCULAR DISEASE AFFECTING RIGHT DOMINANT SIDE: ICD-10-CM

## 2017-10-07 DIAGNOSIS — H59.023 CATARACT (LENS) FRAGMENTS IN EYE FOLLOWING CATARACT SURGERY, BILATERAL: ICD-10-CM

## 2017-10-07 DIAGNOSIS — Z88.5 ALLERGY STATUS TO NARCOTIC AGENT: ICD-10-CM

## 2017-10-07 DIAGNOSIS — Z79.82 LONG TERM (CURRENT) USE OF ASPIRIN: ICD-10-CM

## 2017-10-07 DIAGNOSIS — J45.909 UNSPECIFIED ASTHMA, UNCOMPLICATED: ICD-10-CM

## 2017-10-07 DIAGNOSIS — F41.9 ANXIETY DISORDER, UNSPECIFIED: ICD-10-CM

## 2017-10-07 DIAGNOSIS — M19.90 UNSPECIFIED OSTEOARTHRITIS, UNSPECIFIED SITE: ICD-10-CM

## 2017-10-07 DIAGNOSIS — R13.10 DYSPHAGIA, UNSPECIFIED: ICD-10-CM

## 2017-10-07 DIAGNOSIS — I69.351 HEMIPLEGIA AND HEMIPARESIS FOLLOWING CEREBRAL INFARCTION AFFECTING RIGHT DOMINANT SIDE: ICD-10-CM

## 2017-10-07 DIAGNOSIS — Z95.5 PRESENCE OF CORONARY ANGIOPLASTY IMPLANT AND GRAFT: ICD-10-CM

## 2017-10-07 DIAGNOSIS — R26.9 UNSPECIFIED ABNORMALITIES OF GAIT AND MOBILITY: ICD-10-CM

## 2017-10-07 DIAGNOSIS — N18.9 CHRONIC KIDNEY DISEASE, UNSPECIFIED: ICD-10-CM

## 2017-10-07 DIAGNOSIS — Z95.1 PRESENCE OF AORTOCORONARY BYPASS GRAFT: ICD-10-CM

## 2017-10-07 DIAGNOSIS — I13.0 HYPERTENSIVE HEART AND CHRONIC KIDNEY DISEASE WITH HEART FAILURE AND STAGE 1 THROUGH STAGE 4 CHRONIC KIDNEY DISEASE, OR UNSPECIFIED CHRONIC KIDNEY DISEASE: ICD-10-CM

## 2017-10-07 DIAGNOSIS — K22.70 BARRETT'S ESOPHAGUS WITHOUT DYSPLASIA: ICD-10-CM

## 2017-10-07 DIAGNOSIS — I50.33 ACUTE ON CHRONIC DIASTOLIC (CONGESTIVE) HEART FAILURE: ICD-10-CM

## 2017-10-07 DIAGNOSIS — F03.90 UNSPECIFIED DEMENTIA WITHOUT BEHAVIORAL DISTURBANCE: ICD-10-CM

## 2017-10-07 DIAGNOSIS — I25.10 ATHEROSCLEROTIC HEART DISEASE OF NATIVE CORONARY ARTERY WITHOUT ANGINA PECTORIS: ICD-10-CM

## 2017-10-07 DIAGNOSIS — Z95.4 PRESENCE OF OTHER HEART-VALVE REPLACEMENT: ICD-10-CM

## 2017-10-07 DIAGNOSIS — D46.9 MYELODYSPLASTIC SYNDROME, UNSPECIFIED: ICD-10-CM

## 2017-10-07 DIAGNOSIS — Z96.653 PRESENCE OF ARTIFICIAL KNEE JOINT, BILATERAL: ICD-10-CM

## 2017-10-07 DIAGNOSIS — E78.5 HYPERLIPIDEMIA, UNSPECIFIED: ICD-10-CM

## 2017-10-07 LAB
ALBUMIN SERPL ELPH-MCNC: 3.5 G/DL — SIGNIFICANT CHANGE UP (ref 3.3–5)
ALP SERPL-CCNC: 156 U/L — HIGH (ref 40–120)
ALT FLD-CCNC: 63 U/L — SIGNIFICANT CHANGE UP (ref 12–78)
ANION GAP SERPL CALC-SCNC: 7 MMOL/L — SIGNIFICANT CHANGE UP (ref 5–17)
APPEARANCE UR: CLEAR — SIGNIFICANT CHANGE UP
APTT BLD: 31.8 SEC — SIGNIFICANT CHANGE UP (ref 27.5–37.4)
AST SERPL-CCNC: 53 U/L — HIGH (ref 15–37)
BACTERIA # UR AUTO: ABNORMAL
BASOPHILS # BLD AUTO: 0.1 K/UL — SIGNIFICANT CHANGE UP (ref 0–0.2)
BASOPHILS NFR BLD AUTO: 1.4 % — SIGNIFICANT CHANGE UP (ref 0–2)
BILIRUB SERPL-MCNC: 0.5 MG/DL — SIGNIFICANT CHANGE UP (ref 0.2–1.2)
BILIRUB UR-MCNC: NEGATIVE — SIGNIFICANT CHANGE UP
BUN SERPL-MCNC: 42 MG/DL — HIGH (ref 7–23)
CALCIUM SERPL-MCNC: 8.4 MG/DL — LOW (ref 8.5–10.1)
CHLORIDE SERPL-SCNC: 104 MMOL/L — SIGNIFICANT CHANGE UP (ref 96–108)
CO2 SERPL-SCNC: 26 MMOL/L — SIGNIFICANT CHANGE UP (ref 22–31)
COLOR SPEC: YELLOW — SIGNIFICANT CHANGE UP
COMMENT - URINE: SIGNIFICANT CHANGE UP
CREAT SERPL-MCNC: 1.99 MG/DL — HIGH (ref 0.5–1.3)
DIFF PNL FLD: NEGATIVE — SIGNIFICANT CHANGE UP
EOSINOPHIL # BLD AUTO: 0.1 K/UL — SIGNIFICANT CHANGE UP (ref 0–0.5)
EOSINOPHIL NFR BLD AUTO: 3.6 % — SIGNIFICANT CHANGE UP (ref 0–6)
EPI CELLS # UR: SIGNIFICANT CHANGE UP
GLUCOSE SERPL-MCNC: 84 MG/DL — SIGNIFICANT CHANGE UP (ref 70–99)
GLUCOSE UR QL: NEGATIVE MG/DL — SIGNIFICANT CHANGE UP
HCT VFR BLD CALC: 34.3 % — LOW (ref 34.5–45)
HGB BLD-MCNC: 11 G/DL — LOW (ref 11.5–15.5)
INR BLD: 1.17 RATIO — HIGH (ref 0.88–1.16)
KETONES UR-MCNC: NEGATIVE — SIGNIFICANT CHANGE UP
LEUKOCYTE ESTERASE UR-ACNC: ABNORMAL
LYMPHOCYTES # BLD AUTO: 1.2 K/UL — SIGNIFICANT CHANGE UP (ref 1–3.3)
LYMPHOCYTES # BLD AUTO: 29.8 % — SIGNIFICANT CHANGE UP (ref 13–44)
MCHC RBC-ENTMCNC: 29 PG — SIGNIFICANT CHANGE UP (ref 27–34)
MCHC RBC-ENTMCNC: 32 GM/DL — SIGNIFICANT CHANGE UP (ref 32–36)
MCV RBC AUTO: 90.6 FL — SIGNIFICANT CHANGE UP (ref 80–100)
MONOCYTES # BLD AUTO: 0.6 K/UL — SIGNIFICANT CHANGE UP (ref 0–0.9)
MONOCYTES NFR BLD AUTO: 15.5 % — HIGH (ref 2–14)
NEUTROPHILS # BLD AUTO: 2 K/UL — SIGNIFICANT CHANGE UP (ref 1.8–7.4)
NEUTROPHILS NFR BLD AUTO: 49.7 % — SIGNIFICANT CHANGE UP (ref 43–77)
NITRITE UR-MCNC: NEGATIVE — SIGNIFICANT CHANGE UP
NT-PROBNP SERPL-SCNC: 571 PG/ML — HIGH (ref 0–450)
PH UR: 6 — SIGNIFICANT CHANGE UP (ref 5–8)
PLATELET # BLD AUTO: 162 K/UL — SIGNIFICANT CHANGE UP (ref 150–400)
POTASSIUM SERPL-MCNC: 4.7 MMOL/L — SIGNIFICANT CHANGE UP (ref 3.5–5.3)
POTASSIUM SERPL-SCNC: 4.7 MMOL/L — SIGNIFICANT CHANGE UP (ref 3.5–5.3)
PROT SERPL-MCNC: 8.2 GM/DL — SIGNIFICANT CHANGE UP (ref 6–8.3)
PROT UR-MCNC: NEGATIVE MG/DL — SIGNIFICANT CHANGE UP
PROTHROM AB SERPL-ACNC: 12.8 SEC — HIGH (ref 9.8–12.7)
RBC # BLD: 3.79 M/UL — LOW (ref 3.8–5.2)
RBC # FLD: 15.2 % — HIGH (ref 11–15)
RBC CASTS # UR COMP ASSIST: SIGNIFICANT CHANGE UP /HPF (ref 0–4)
SODIUM SERPL-SCNC: 137 MMOL/L — SIGNIFICANT CHANGE UP (ref 135–145)
SP GR SPEC: 1.01 — SIGNIFICANT CHANGE UP (ref 1.01–1.02)
TROPONIN I SERPL-MCNC: 0.02 NG/ML — SIGNIFICANT CHANGE UP (ref 0.01–0.04)
UROBILINOGEN FLD QL: NEGATIVE MG/DL — SIGNIFICANT CHANGE UP
WBC # BLD: 4.1 K/UL — SIGNIFICANT CHANGE UP (ref 3.8–10.5)
WBC # FLD AUTO: 4.1 K/UL — SIGNIFICANT CHANGE UP (ref 3.8–10.5)
WBC UR QL: SIGNIFICANT CHANGE UP

## 2017-10-07 PROCEDURE — 70450 CT HEAD/BRAIN W/O DYE: CPT | Mod: 26

## 2017-10-07 PROCEDURE — 99285 EMERGENCY DEPT VISIT HI MDM: CPT

## 2017-10-07 PROCEDURE — 71010: CPT | Mod: 26

## 2017-10-07 RX ORDER — FUROSEMIDE 40 MG
40 TABLET ORAL DAILY
Qty: 0 | Refills: 0 | Status: DISCONTINUED | OUTPATIENT
Start: 2017-10-07 | End: 2017-10-10

## 2017-10-07 RX ORDER — HEPARIN SODIUM 5000 [USP'U]/ML
5000 INJECTION INTRAVENOUS; SUBCUTANEOUS EVERY 12 HOURS
Qty: 0 | Refills: 0 | Status: DISCONTINUED | OUTPATIENT
Start: 2017-10-07 | End: 2017-10-10

## 2017-10-07 RX ORDER — PANTOPRAZOLE SODIUM 20 MG/1
40 TABLET, DELAYED RELEASE ORAL
Qty: 0 | Refills: 0 | Status: DISCONTINUED | OUTPATIENT
Start: 2017-10-07 | End: 2017-10-10

## 2017-10-07 RX ORDER — ATORVASTATIN CALCIUM 80 MG/1
80 TABLET, FILM COATED ORAL AT BEDTIME
Qty: 0 | Refills: 0 | Status: DISCONTINUED | OUTPATIENT
Start: 2017-10-07 | End: 2017-10-10

## 2017-10-07 RX ORDER — FUROSEMIDE 40 MG
40 TABLET ORAL DAILY
Qty: 0 | Refills: 0 | Status: DISCONTINUED | OUTPATIENT
Start: 2017-10-07 | End: 2017-10-07

## 2017-10-07 RX ORDER — QUETIAPINE FUMARATE 200 MG/1
50 TABLET, FILM COATED ORAL AT BEDTIME
Qty: 0 | Refills: 0 | Status: DISCONTINUED | OUTPATIENT
Start: 2017-10-07 | End: 2017-10-10

## 2017-10-07 RX ORDER — IPRATROPIUM/ALBUTEROL SULFATE 18-103MCG
3 AEROSOL WITH ADAPTER (GRAM) INHALATION EVERY 8 HOURS
Qty: 0 | Refills: 0 | Status: DISCONTINUED | OUTPATIENT
Start: 2017-10-07 | End: 2017-10-10

## 2017-10-07 RX ORDER — ASPIRIN/CALCIUM CARB/MAGNESIUM 324 MG
81 TABLET ORAL DAILY
Qty: 0 | Refills: 0 | Status: DISCONTINUED | OUTPATIENT
Start: 2017-10-07 | End: 2017-10-10

## 2017-10-07 RX ORDER — SODIUM CHLORIDE 9 MG/ML
1 INJECTION INTRAMUSCULAR; INTRAVENOUS; SUBCUTANEOUS DAILY
Qty: 0 | Refills: 0 | Status: DISCONTINUED | OUTPATIENT
Start: 2017-10-07 | End: 2017-10-10

## 2017-10-07 RX ORDER — CLOPIDOGREL BISULFATE 75 MG/1
75 TABLET, FILM COATED ORAL DAILY
Qty: 0 | Refills: 0 | Status: DISCONTINUED | OUTPATIENT
Start: 2017-10-07 | End: 2017-10-10

## 2017-10-07 RX ORDER — OXYBUTYNIN CHLORIDE 5 MG
5 TABLET ORAL THREE TIMES A DAY
Qty: 0 | Refills: 0 | Status: DISCONTINUED | OUTPATIENT
Start: 2017-10-07 | End: 2017-10-10

## 2017-10-07 RX ORDER — AMIODARONE HYDROCHLORIDE 400 MG/1
200 TABLET ORAL DAILY
Qty: 0 | Refills: 0 | Status: DISCONTINUED | OUTPATIENT
Start: 2017-10-07 | End: 2017-10-10

## 2017-10-07 RX ADMIN — QUETIAPINE FUMARATE 50 MILLIGRAM(S): 200 TABLET, FILM COATED ORAL at 23:10

## 2017-10-07 RX ADMIN — HEPARIN SODIUM 5000 UNIT(S): 5000 INJECTION INTRAVENOUS; SUBCUTANEOUS at 18:23

## 2017-10-07 RX ADMIN — Medication 5 MILLIGRAM(S): at 23:10

## 2017-10-07 RX ADMIN — ATORVASTATIN CALCIUM 80 MILLIGRAM(S): 80 TABLET, FILM COATED ORAL at 23:10

## 2017-10-07 NOTE — H&P ADULT - NSHPLABSRESULTS_GEN_ALL_CORE
11.0   4.1   )-----------( 162      ( 07 Oct 2017 15:14 )             34.3     10    137  |  104  |  42<H>  ----------------------------<  84  4.7   |  26  |  1.99<H>    Ca    8.4<L>      07 Oct 2017 15:14    TPro  8.2  /  Alb  3.5  /  TBili  0.5  /  DBili  x   /  AST  53<H>  /  ALT  63  /  AlkPhos  156<H>  10    PT/INR - ( 07 Oct 2017 15:14 )   PT: 12.8 sec;   INR: 1.17 ratio         PTT - ( 07 Oct 2017 15:14 )  PTT:31.8 sec  Urinalysis Basic - ( 07 Oct 2017 16:50 )    Color: Yellow / Appearance: Clear / S.010 / pH: x  Gluc: x / Ketone: Negative  / Bili: Negative / Urobili: Negative mg/dL   Blood: x / Protein: Negative mg/dL / Nitrite: Negative   Leuk Esterase: Trace / RBC: 0-2 /HPF / WBC 0-2   Sq Epi: x / Non Sq Epi: Few / Bacteria: Occasional      CAPILLARY BLOOD GLUCOSE

## 2017-10-07 NOTE — ED PROVIDER NOTE - PHYSICAL EXAMINATION
GEN: Alert, NAD  HEAD: atraumatic, EOMI, PERRL, normal lids/conjunctiva  ENT: normal hearing, patent oropharynx without erythema/exudate, uvula midline  NECK: +supple, no tenderness/meningismus, +Trachea midline  PULM: Bilateral BS, normal resp effort, no wheeze/stridor/retractions  CV: RRR, no M/R/G, +dist ext pulses  ABD: soft, NT/ND  BACK: no CVAT, no midline tenderness  MSK: no edema/erythema/cyanosis  SKIN: no rash  NEURO: AAOx3,  RUE 3/5 contractures, LUE 4/5 - baseline  RLE 2-3/5 able to minimaly dorsi/plantar flex at ankle  LLE - 4/5  R tongue deviation (unknown if baseline), and mild dysarthria (baseline)

## 2017-10-07 NOTE — H&P ADULT - HISTORY OF PRESENT ILLNESS
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 left diastolic chf who was discharged from rehab 3 days ago p/w worsening RLE weakness. fmaily noticed it when she was discharged, has baseline weakness but has gotten worse to the point she is dragging her feet, no headache, no UE weakness that's new, no vision changes, no chagnes in her speech (has baseline slurred speech), no urinary incontinence that's changed, no back pain/injuries

## 2017-10-07 NOTE — ED ADULT TRIAGE NOTE - CHIEF COMPLAINT QUOTE
BIBA for increasing difficulty walking and moving right leg. Was d/lizy from rehab 4 days ago, presents with worsening walking since. Denies nausea, vomiting, fever, chills diarrhea.

## 2017-10-07 NOTE — ED ADULT NURSE REASSESSMENT NOTE - NS ED NURSE REASSESS COMMENT FT1
Rcvd patient AAOX2 denies pain at this time. Patient aware of self and place. 22 gauge R- wrist intact. Patient able to move L- extremeity, unable to move R- lower extremity

## 2017-10-07 NOTE — H&P ADULT - ASSESSMENT
82 years old female is admitted for acute on chronic left diastolic CHF, right leg weakness, unsteady gait, ASHD with stent, s/p Bio AVR  Plan: IV Diuretics. PT

## 2017-10-07 NOTE — ED ADULT NURSE NOTE - OBJECTIVE STATEMENT
Pt to the ed with family. Pt with  increasing difficulty walking and moving right leg. Was discharge  from rehab 4 days ago, presents with worsening walking since. Pt with right upper extremity contracted and decreased movement of left lower extremity Pt is oriented to place and self not time and situation. She is incontinent of urine skin intact Denies nausea, vomiting, fever, chills diarrhea. Pt to the ed with family. Pt with  increasing difficulty walking and moving right leg. Was discharge  from rehab 4 days ago, presents with worsening walking since. Pt with right upper extremity contracted and decreased movement of right lower extremity Pt is oriented to place and self not time and situation. She is incontinent of urine skin intact Denies nausea, vomiting, fever, chills diarrhea.

## 2017-10-08 LAB
ALBUMIN SERPL ELPH-MCNC: 3 G/DL — LOW (ref 3.3–5)
ALP SERPL-CCNC: 130 U/L — HIGH (ref 40–120)
ALT FLD-CCNC: 52 U/L — SIGNIFICANT CHANGE UP (ref 12–78)
ANION GAP SERPL CALC-SCNC: 10 MMOL/L — SIGNIFICANT CHANGE UP (ref 5–17)
ANISOCYTOSIS BLD QL: SLIGHT — SIGNIFICANT CHANGE UP
AST SERPL-CCNC: 40 U/L — HIGH (ref 15–37)
BILIRUB SERPL-MCNC: 0.4 MG/DL — SIGNIFICANT CHANGE UP (ref 0.2–1.2)
BUN SERPL-MCNC: 41 MG/DL — HIGH (ref 7–23)
CALCIUM SERPL-MCNC: 8.2 MG/DL — LOW (ref 8.5–10.1)
CHLORIDE SERPL-SCNC: 106 MMOL/L — SIGNIFICANT CHANGE UP (ref 96–108)
CO2 SERPL-SCNC: 25 MMOL/L — SIGNIFICANT CHANGE UP (ref 22–31)
CREAT SERPL-MCNC: 1.82 MG/DL — HIGH (ref 0.5–1.3)
CULTURE RESULTS: SIGNIFICANT CHANGE UP
EOSINOPHIL NFR BLD AUTO: 4 % — SIGNIFICANT CHANGE UP (ref 0–6)
GLUCOSE SERPL-MCNC: 81 MG/DL — SIGNIFICANT CHANGE UP (ref 70–99)
HCT VFR BLD CALC: 31 % — LOW (ref 34.5–45)
HGB BLD-MCNC: 9.8 G/DL — LOW (ref 11.5–15.5)
HYPOCHROMIA BLD QL: SLIGHT — SIGNIFICANT CHANGE UP
LYMPHOCYTES # BLD AUTO: 43 % — SIGNIFICANT CHANGE UP (ref 13–44)
MCHC RBC-ENTMCNC: 29.2 PG — SIGNIFICANT CHANGE UP (ref 27–34)
MCHC RBC-ENTMCNC: 31.8 GM/DL — LOW (ref 32–36)
MCV RBC AUTO: 91.9 FL — SIGNIFICANT CHANGE UP (ref 80–100)
MICROCYTES BLD QL: SLIGHT — SIGNIFICANT CHANGE UP
MONOCYTES NFR BLD AUTO: 11 % — SIGNIFICANT CHANGE UP (ref 2–14)
NEUTROPHILS NFR BLD AUTO: 42 % — LOW (ref 43–77)
PLAT MORPH BLD: NORMAL — SIGNIFICANT CHANGE UP
PLATELET # BLD AUTO: 142 K/UL — LOW (ref 150–400)
POTASSIUM SERPL-MCNC: 3.9 MMOL/L — SIGNIFICANT CHANGE UP (ref 3.5–5.3)
POTASSIUM SERPL-SCNC: 3.9 MMOL/L — SIGNIFICANT CHANGE UP (ref 3.5–5.3)
PROT SERPL-MCNC: 6.9 GM/DL — SIGNIFICANT CHANGE UP (ref 6–8.3)
RBC # BLD: 3.38 M/UL — LOW (ref 3.8–5.2)
RBC # FLD: 14.9 % — SIGNIFICANT CHANGE UP (ref 11–15)
RBC BLD AUTO: ABNORMAL
SCHISTOCYTES BLD QL AUTO: SLIGHT — SIGNIFICANT CHANGE UP
SODIUM SERPL-SCNC: 141 MMOL/L — SIGNIFICANT CHANGE UP (ref 135–145)
SPECIMEN SOURCE: SIGNIFICANT CHANGE UP
WBC # BLD: 3.5 K/UL — LOW (ref 3.8–10.5)
WBC # FLD AUTO: 3.5 K/UL — LOW (ref 3.8–10.5)

## 2017-10-08 RX ORDER — NYSTATIN CREAM 100000 [USP'U]/G
1 CREAM TOPICAL THREE TIMES A DAY
Qty: 0 | Refills: 0 | Status: DISCONTINUED | OUTPATIENT
Start: 2017-10-08 | End: 2017-10-10

## 2017-10-08 RX ADMIN — HEPARIN SODIUM 5000 UNIT(S): 5000 INJECTION INTRAVENOUS; SUBCUTANEOUS at 05:20

## 2017-10-08 RX ADMIN — ATORVASTATIN CALCIUM 80 MILLIGRAM(S): 80 TABLET, FILM COATED ORAL at 22:17

## 2017-10-08 RX ADMIN — Medication 5 MILLIGRAM(S): at 05:19

## 2017-10-08 RX ADMIN — Medication 81 MILLIGRAM(S): at 11:15

## 2017-10-08 RX ADMIN — SODIUM CHLORIDE 1 GRAM(S): 9 INJECTION INTRAMUSCULAR; INTRAVENOUS; SUBCUTANEOUS at 11:15

## 2017-10-08 RX ADMIN — HEPARIN SODIUM 5000 UNIT(S): 5000 INJECTION INTRAVENOUS; SUBCUTANEOUS at 17:08

## 2017-10-08 RX ADMIN — Medication 20 MILLIGRAM(S): at 13:12

## 2017-10-08 RX ADMIN — QUETIAPINE FUMARATE 50 MILLIGRAM(S): 200 TABLET, FILM COATED ORAL at 22:17

## 2017-10-08 RX ADMIN — Medication 40 MILLIGRAM(S): at 05:16

## 2017-10-08 RX ADMIN — Medication 5 MILLIGRAM(S): at 22:17

## 2017-10-08 RX ADMIN — AMIODARONE HYDROCHLORIDE 200 MILLIGRAM(S): 400 TABLET ORAL at 05:19

## 2017-10-08 RX ADMIN — NYSTATIN CREAM 1 APPLICATION(S): 100000 CREAM TOPICAL at 22:20

## 2017-10-08 RX ADMIN — CLOPIDOGREL BISULFATE 75 MILLIGRAM(S): 75 TABLET, FILM COATED ORAL at 11:15

## 2017-10-08 RX ADMIN — Medication 5 MILLIGRAM(S): at 13:12

## 2017-10-08 RX ADMIN — PANTOPRAZOLE SODIUM 40 MILLIGRAM(S): 20 TABLET, DELAYED RELEASE ORAL at 07:40

## 2017-10-08 NOTE — CONSULT NOTE ADULT - ASSESSMENT
consuklt dict old left cva r sided weakness unsteady gait no seziure dementia  discuss with family for eeg tsh b12 for sub acute rehab

## 2017-10-08 NOTE — PHYSICAL THERAPY INITIAL EVALUATION ADULT - MODIFIED CLINICAL TEST OF SENSORY INTEGRATION IN BALANCE TEST
Barthel Index: Feeding Score _0  , Bathing Score _0 _, Grooming Score _0  _, Dressing Score _0  _, Bowels Score _0    __, Bladder Score _0   _, Toilet Score _0  , Transfers Score _0   __, Mobility Score _0   _, Stairs Score _0_,     Total Score ___

## 2017-10-08 NOTE — PROGRESS NOTE ADULT - SUBJECTIVE AND OBJECTIVE BOX
Patient is a 82y old  Female who presents with a chief complaint of SOB (07 Oct 2017 17:29)    MEDICAL PROBLEMS:  RIGHT LEG WEAKNESS  CVA (cerebral infarction)  Anxiety  HLD (hyperlipidemia)  HTN (hypertension)  Hypertension  Hyperlipidemia  Bilateral cataracts  Osteoarthritis  Myelodysplastic syndrome  H/O spinal stenosis  Aortic stenosis  Asthma  Myeloblastic anemia  Moe esophagus  H/O heart artery stent  CAD (coronary artery disease)  Right leg weakness      INTERVAL HPI/OVERNIGHT EVENTS: Alert, less SOB from CHF. Still has right leg weakness    MEDICATIONS  (STANDING):  amiodarone    Tablet 200 milliGRAM(s) Oral daily  aspirin enteric coated 81 milliGRAM(s) Oral daily  atorvastatin 80 milliGRAM(s) Oral at bedtime  clopidogrel Tablet 75 milliGRAM(s) Oral daily  furosemide   Injectable 40 milliGRAM(s) IV Push daily  heparin  Injectable 5000 Unit(s) SubCutaneous every 12 hours  oxybutynin 5 milliGRAM(s) Oral three times a day  pantoprazole    Tablet 40 milliGRAM(s) Oral before breakfast  PARoxetine 20 milliGRAM(s) Oral daily  QUEtiapine 50 milliGRAM(s) Oral at bedtime  sodium chloride 1 Gram(s) Oral daily    MEDICATIONS  (PRN):  ALBUTerol/ipratropium for Nebulization 3 milliLiter(s) Nebulizer every 8 hours PRN Bronchospasm  diazepam    Tablet 5 milliGRAM(s) Oral at bedtime PRN anxiety    Allergies    codeine (Unknown)    Intolerances      Vital Signs Last 24 Hrs  T(C): 36.1 (08 Oct 2017 11:27), Max: 37.1 (07 Oct 2017 18:27)  T(F): 96.9 (08 Oct 2017 11:27), Max: 98.8 (07 Oct 2017 18:27)  HR: 63 (08 Oct 2017 11:27) (60 - 67)  BP: 142/66 (08 Oct 2017 11:27) (117/61 - 179/76)  BP(mean): --  RR: 16 (08 Oct 2017 11:27) (15 - 19)  SpO2: 96% (08 Oct 2017 11:27) (94% - 100%)    PHYSICAL EXAM:  GENERAL: NAD, well-groomed, well-developed  HEAD:  Atraumatic, Normocephalic  EYES: EOMI, PERRLA, conjunctiva and sclera clear  ENMT: No tonsillar erythema, exudates, or enlargement; Moist mucous membranes, Good dentition, No lesions  NECK: Supple, No JVD, Normal thyroid  NERVOUS SYSTEM:  Alert & Oriented X3, Good concentration; Motor Strength 5/5 B/L upper and lower extremities; DTRs 2+ intact and symmetric  CHEST/LUNG: Clear to percussion bilaterally; No rales, rhonchi, wheezing, or rubs  HEART: Regular rate and rhythm; No murmurs, rubs, or gallops  ABDOMEN: Soft, Nontender, Nondistended; Bowel sounds present  EXTREMITIES:  2+ Peripheral Pulses, No clubbing, cyanosis, or edema  LYMPH: No lymphadenopathy noted  SKIN: No rashes or lesions    10-08 @ 07:01  -  10-08 @ 11:45  --------------------------------------------------------  IN: 692 mL / OUT: 0 mL / NET: 692 mL        LABS:                        9.8    3.5   )-----------( 142      ( 08 Oct 2017 07:22 )             31.0     10-08    141  |  106  |  41<H>  ----------------------------<  81  3.9   |  25  |  1.82<H>    Ca    8.2<L>      08 Oct 2017 07:22    TPro  6.9  /  Alb  3.0<L>  /  TBili  0.4  /  DBili  x   /  AST  40<H>  /  ALT  52  /  AlkPhos  130<H>  1008    PT/INR - ( 07 Oct 2017 15:14 )   PT: 12.8 sec;   INR: 1.17 ratio         PTT - ( 07 Oct 2017 15:14 )  PTT:31.8 sec  Urinalysis Basic - ( 07 Oct 2017 16:50 )    Color: Yellow / Appearance: Clear / S.010 / pH: x  Gluc: x / Ketone: Negative  / Bili: Negative / Urobili: Negative mg/dL   Blood: x / Protein: Negative mg/dL / Nitrite: Negative   Leuk Esterase: Trace / RBC: 0-2 /HPF / WBC 0-2   Sq Epi: x / Non Sq Epi: Few / Bacteria: Occasional      CAPILLARY BLOOD GLUCOSE        Troponin I, Serum: .016 ng/mL (10-07 @ 15:14)    Cultures:            RADIOLOGY & ADDITIONAL TESTS:    Imaging Personally Reviewed:  [X] YES  [ ] NO    Consultant(s) Notes Reviewed:  [X] YES  [ ] NO    Care Discussed with Consultants/Other Providers [X] YES  [ ] NO

## 2017-10-08 NOTE — PHYSICAL THERAPY INITIAL EVALUATION ADULT - ADDITIONAL COMMENTS
Pt is a poor historian. Pt reports living with family in a house. Pt ambulated with walkers indep and assist needed with ADLs.

## 2017-10-08 NOTE — CONSULT NOTE ADULT - SUBJECTIVE AND OBJECTIVE BOX
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 left diastolic chf who was discharged from rehab 3 days ago p/w worsening RLE weakness. fmaily noticed it when she was discharged, has baseline weakness but has gotten worse to the point she is dragging her feet, no headache, no UE weakness that's new, no vision changes, no chagnes in her speech (has baseline slurred speech), no urinary incontinence that's changed, no back pain/injuries (07 Oct 2017 17:29)      Chief Complaint:  Patient is a 82y old  Female who presents with a chief complaint of SOB (07 Oct 2017 17:29)      Review of Systems:  see above          Social History/Family History  SOCHX:   tobacco,  -  alcohol    FMHX: FA/MO  - contributory       Discussed with:  PMD, Family    Physical Exam:    Vital Signs:  Vital Signs Last 24 Hrs  T(C): 37.2 (08 Oct 2017 17:04), Max: 37.2 (08 Oct 2017 17:04)  T(F): 98.9 (08 Oct 2017 17:04), Max: 98.9 (08 Oct 2017 17:04)  HR: 100 (08 Oct 2017 17:04) (60 - 100)  BP: 148/89 (08 Oct 2017 17:04) (117/61 - 148/89)  BP(mean): --  RR: 17 (08 Oct 2017 17:04) (16 - 18)  SpO2: 99% (08 Oct 2017 17:04) (94% - 100%)  Daily Height in cm: 162.56 (07 Oct 2017 22:30)    Daily Weight in k.2 (08 Oct 2017 05:09)  I&O's Summary    08 Oct 2017 07:01  -  08 Oct 2017 21:01  --------------------------------------------------------  IN: 692 mL / OUT: 0 mL / NET: 692 mL          Chest:  Full & symmetric excursion, no increased effort, breath sounds clear  Cardiovascular:  Regular rhythm, S1, S2, no murmur/rub/S3/S4, no carotid/femoral/abdominal bruit, radial/pedal pulses 2+, no edema  Abdomen:  Soft, non-tender, non-distended, normoactive bowel sounds, no HSM      Laboratory:                          9.8    3.5   )-----------( 142      ( 08 Oct 2017 07:22 )             31.0     10-    141  |  106  |  41<H>  ----------------------------<  81  3.9   |  25  |  1.82<H>    Ca    8.2<L>      08 Oct 2017 07:22    TPro  6.9  /  Alb  3.0<L>  /  TBili  0.4  /  DBili  x   /  AST  40<H>  /  ALT  52  /  AlkPhos  130<H>  10      CARDIAC MARKERS ( 07 Oct 2017 15:14 )  .016 ng/mL / x     / x     / x     / x          CAPILLARY BLOOD GLUCOSE        LIVER FUNCTIONS - ( 08 Oct 2017 07:22 )  Alb: 3.0 g/dL / Pro: 6.9 gm/dL / ALK PHOS: 130 U/L / ALT: 52 U/L / AST: 40 U/L / GGT: x           PT/INR - ( 07 Oct 2017 15:14 )   PT: 12.8 sec;   INR: 1.17 ratio         PTT - ( 07 Oct 2017 15:14 )  PTT:31.8 sec  Urinalysis Basic - ( 07 Oct 2017 16:50 )    Color: Yellow / Appearance: Clear / S.010 / pH: x  Gluc: x / Ketone: Negative  / Bili: Negative / Urobili: Negative mg/dL   Blood: x / Protein: Negative mg/dL / Nitrite: Negative   Leuk Esterase: Trace / RBC: 0-2 /HPF / WBC 0-2   Sq Epi: x / Non Sq Epi: Few / Bacteria: Occasional      Assessment:  CAD  Recent CABG, AVR  Sent home recently from REhab now with weakness  Labs reviewed  Neuro noted

## 2017-10-09 ENCOUNTER — TRANSCRIPTION ENCOUNTER (OUTPATIENT)
Age: 82
End: 2017-10-09

## 2017-10-09 LAB
ANION GAP SERPL CALC-SCNC: 10 MMOL/L — SIGNIFICANT CHANGE UP (ref 5–17)
BUN SERPL-MCNC: 40 MG/DL — HIGH (ref 7–23)
CALCIUM SERPL-MCNC: 8.4 MG/DL — LOW (ref 8.5–10.1)
CHLORIDE SERPL-SCNC: 106 MMOL/L — SIGNIFICANT CHANGE UP (ref 96–108)
CK SERPL-CCNC: 129 U/L — SIGNIFICANT CHANGE UP (ref 26–192)
CO2 SERPL-SCNC: 25 MMOL/L — SIGNIFICANT CHANGE UP (ref 22–31)
CREAT SERPL-MCNC: 2.04 MG/DL — HIGH (ref 0.5–1.3)
GLUCOSE SERPL-MCNC: 84 MG/DL — SIGNIFICANT CHANGE UP (ref 70–99)
POTASSIUM SERPL-MCNC: 4.1 MMOL/L — SIGNIFICANT CHANGE UP (ref 3.5–5.3)
POTASSIUM SERPL-SCNC: 4.1 MMOL/L — SIGNIFICANT CHANGE UP (ref 3.5–5.3)
SODIUM SERPL-SCNC: 141 MMOL/L — SIGNIFICANT CHANGE UP (ref 135–145)
TROPONIN I SERPL-MCNC: 0.02 NG/ML — SIGNIFICANT CHANGE UP (ref 0.01–0.04)

## 2017-10-09 RX ORDER — CLOPIDOGREL BISULFATE 75 MG/1
1 TABLET, FILM COATED ORAL
Qty: 0 | Refills: 0 | COMMUNITY

## 2017-10-09 RX ORDER — AMIODARONE HYDROCHLORIDE 400 MG/1
1 TABLET ORAL
Qty: 0 | Refills: 0 | COMMUNITY
Start: 2017-10-09

## 2017-10-09 RX ORDER — DIAZEPAM 5 MG
1 TABLET ORAL
Qty: 0 | Refills: 0 | COMMUNITY
Start: 2017-10-09

## 2017-10-09 RX ORDER — FUROSEMIDE 40 MG
40 TABLET ORAL
Qty: 0 | Refills: 0 | COMMUNITY
Start: 2017-10-09

## 2017-10-09 RX ORDER — SODIUM CHLORIDE 9 MG/ML
1 INJECTION INTRAMUSCULAR; INTRAVENOUS; SUBCUTANEOUS
Qty: 0 | Refills: 0 | COMMUNITY
Start: 2017-10-09

## 2017-10-09 RX ORDER — FUROSEMIDE 40 MG
1 TABLET ORAL
Qty: 30 | Refills: 0 | OUTPATIENT
Start: 2017-10-09 | End: 2017-11-08

## 2017-10-09 RX ORDER — CLOPIDOGREL BISULFATE 75 MG/1
1 TABLET, FILM COATED ORAL
Qty: 0 | Refills: 0 | COMMUNITY
Start: 2017-10-09

## 2017-10-09 RX ORDER — QUETIAPINE FUMARATE 200 MG/1
1 TABLET, FILM COATED ORAL
Qty: 0 | Refills: 0 | COMMUNITY
Start: 2017-10-09

## 2017-10-09 RX ORDER — ASPIRIN/CALCIUM CARB/MAGNESIUM 324 MG
1 TABLET ORAL
Qty: 0 | Refills: 0 | COMMUNITY
Start: 2017-10-09

## 2017-10-09 RX ORDER — OXYBUTYNIN CHLORIDE 5 MG
1 TABLET ORAL
Qty: 0 | Refills: 0 | COMMUNITY

## 2017-10-09 RX ORDER — ATORVASTATIN CALCIUM 80 MG/1
1 TABLET, FILM COATED ORAL
Qty: 0 | Refills: 0 | COMMUNITY
Start: 2017-10-09

## 2017-10-09 RX ORDER — OXYBUTYNIN CHLORIDE 5 MG
1 TABLET ORAL
Qty: 0 | Refills: 0 | COMMUNITY
Start: 2017-10-09

## 2017-10-09 RX ORDER — QUETIAPINE FUMARATE 200 MG/1
1 TABLET, FILM COATED ORAL
Qty: 0 | Refills: 0 | COMMUNITY

## 2017-10-09 RX ORDER — NYSTATIN CREAM 100000 [USP'U]/G
1 CREAM TOPICAL
Qty: 60 | Refills: 0 | OUTPATIENT
Start: 2017-10-09 | End: 2017-10-16

## 2017-10-09 RX ORDER — FUROSEMIDE 40 MG
1 TABLET ORAL
Qty: 0 | Refills: 0 | COMMUNITY

## 2017-10-09 RX ADMIN — Medication 20 MILLIGRAM(S): at 12:29

## 2017-10-09 RX ADMIN — Medication 81 MILLIGRAM(S): at 12:28

## 2017-10-09 RX ADMIN — HEPARIN SODIUM 5000 UNIT(S): 5000 INJECTION INTRAVENOUS; SUBCUTANEOUS at 06:18

## 2017-10-09 RX ADMIN — Medication 5 MILLIGRAM(S): at 06:18

## 2017-10-09 RX ADMIN — NYSTATIN CREAM 1 APPLICATION(S): 100000 CREAM TOPICAL at 13:25

## 2017-10-09 RX ADMIN — Medication 5 MILLIGRAM(S): at 13:24

## 2017-10-09 RX ADMIN — SODIUM CHLORIDE 1 GRAM(S): 9 INJECTION INTRAMUSCULAR; INTRAVENOUS; SUBCUTANEOUS at 12:27

## 2017-10-09 RX ADMIN — NYSTATIN CREAM 1 APPLICATION(S): 100000 CREAM TOPICAL at 21:57

## 2017-10-09 RX ADMIN — ATORVASTATIN CALCIUM 80 MILLIGRAM(S): 80 TABLET, FILM COATED ORAL at 21:57

## 2017-10-09 RX ADMIN — Medication 40 MILLIGRAM(S): at 06:18

## 2017-10-09 RX ADMIN — HEPARIN SODIUM 5000 UNIT(S): 5000 INJECTION INTRAVENOUS; SUBCUTANEOUS at 17:37

## 2017-10-09 RX ADMIN — QUETIAPINE FUMARATE 50 MILLIGRAM(S): 200 TABLET, FILM COATED ORAL at 21:57

## 2017-10-09 RX ADMIN — NYSTATIN CREAM 1 APPLICATION(S): 100000 CREAM TOPICAL at 06:17

## 2017-10-09 RX ADMIN — PANTOPRAZOLE SODIUM 40 MILLIGRAM(S): 20 TABLET, DELAYED RELEASE ORAL at 07:48

## 2017-10-09 RX ADMIN — AMIODARONE HYDROCHLORIDE 200 MILLIGRAM(S): 400 TABLET ORAL at 06:18

## 2017-10-09 RX ADMIN — Medication 5 MILLIGRAM(S): at 21:58

## 2017-10-09 RX ADMIN — CLOPIDOGREL BISULFATE 75 MILLIGRAM(S): 75 TABLET, FILM COATED ORAL at 12:28

## 2017-10-09 NOTE — DISCHARGE NOTE ADULT - CARE PROVIDER_API CALL
Yosi Malloy), Internal Medicine  [address] [place] [address]  Phone: [phone]  Fax: [phone]    Shania Gilmore (MD), Neurology  [address] [place] [id number]  Phone: [phone]  Fax: [phone]    Francis Green), Cardiology  [address] [place] [address]  Phone: [phone]  Fax: [phone]

## 2017-10-09 NOTE — DISCHARGE NOTE ADULT - MEDICATION SUMMARY - MEDICATIONS TO TAKE
I will START or STAY ON the medications listed below when I get home from the hospital:    aspirin 81 mg oral delayed release tablet  -- 1 tab(s) by mouth once a day  -- Indication: For Preventative    amiodarone 200 mg oral tablet  -- 1 tab(s) by mouth once a day  -- Indication: For Antiarrhythmic    diazePAM 5 mg oral tablet  -- 1 tab(s) by mouth once a day (at bedtime), As needed, anxiety  -- Indication: For Anxiety    PARoxetine 20 mg oral tablet  -- 1 tab(s) by mouth once a day  -- Indication: For Antidepressant    atorvastatin 80 mg oral tablet  -- 1 tab(s) by mouth once a day (at bedtime)  -- Indication: For HLD    clopidogrel 75 mg oral tablet  -- 1 tab(s) by mouth once a day  -- Indication: For Preventative    QUEtiapine 50 mg oral tablet  -- 1 tab(s) by mouth once a day (at bedtime)  -- Indication: For Antipsych    albuterol 0.63 mg/3 mL (0.021%) inhalation solution  -- 3 milliliter(s) inhaled 3 times a day, As Needed hosp & home  -- Indication: For Asthma    nystatin 100,000 units/g topical powder  -- 1 application on skin 3 times a day  -- Indication: For Rash    Lasix 40 mg oral tablet  -- 1 tab(s) by mouth once a day  -- Indication: For CHF    sodium chloride 1 g oral tablet  -- 1 tab(s) by mouth once a day  -- Indication: For Supplement    omeprazole 20 mg oral delayed release capsule  -- 1 cap(s) by mouth once a day  home  -- Indication: For GERD    oxybutynin 5 mg oral tablet  -- 1 tab(s) by mouth 3 times a day  -- Indication: For Overactive bladder I will START or STAY ON the medications listed below when I get home from the hospital:    aspirin 81 mg oral delayed release tablet  -- 1 tab(s) by mouth once a day  -- Indication: For Preventative    amiodarone 200 mg oral tablet  -- 1 tab(s) by mouth once a day  -- Indication: For Antiarrhythmic    heparin  -- 5000 unit(s) subcutaneous 2 times a day  -- Indication: For DVT prophylaxis    diazePAM 5 mg oral tablet  -- 1 tab(s) by mouth once a day (at bedtime), As needed, anxiety  -- Indication: For Anxiety    PARoxetine 20 mg oral tablet  -- 1 tab(s) by mouth once a day  -- Indication: For Antidepressant    atorvastatin 80 mg oral tablet  -- 1 tab(s) by mouth once a day (at bedtime)  -- Indication: For HLD    clopidogrel 75 mg oral tablet  -- 1 tab(s) by mouth once a day  -- Indication: For Preventative    QUEtiapine 50 mg oral tablet  -- 1 tab(s) by mouth once a day (at bedtime)  -- Indication: For Antipsych    albuterol 0.63 mg/3 mL (0.021%) inhalation solution  -- 3 milliliter(s) inhaled 3 times a day, As Needed hosp & home  -- Indication: For Asthma    nystatin 100,000 units/g topical powder  -- 1 application on skin 3 times a day  -- Indication: For Rash    Lasix 40 mg oral tablet  -- 1 tab(s) by mouth once a day  -- Indication: For CHF    sodium chloride 1 g oral tablet  -- 1 tab(s) by mouth once a day  -- Indication: For Supplement    omeprazole 20 mg oral delayed release capsule  -- 1 cap(s) by mouth once a day  home  -- Indication: For GERD    oxybutynin 5 mg oral tablet  -- 1 tab(s) by mouth 3 times a day  -- Indication: For Overactive bladder

## 2017-10-09 NOTE — DISCHARGE NOTE ADULT - CARE PLAN
Principal Discharge DX:	Right leg weakness  Goal:	Work up negative for CVA  Instructions for follow-up, activity and diet:	Continue Lasix. Follow up with PMD.  For KASIA.  Secondary Diagnosis:	Anxiety  Instructions for follow-up, activity and diet:	Continue home meds  Secondary Diagnosis:	Essential hypertension  Instructions for follow-up, activity and diet:	Continue home blood pressure medications  Follow up with PCP  Low-sodium diet  AHA Recipes - [email or web address]  Secondary Diagnosis:	HLD (hyperlipidemia)  Instructions for follow-up, activity and diet:	Continue home statin medication  Follow up with PCP  Heart healthy diet - [email or web address]  Secondary Diagnosis:	CAD (coronary artery disease)  Instructions for follow-up, activity and diet:	Continue home meds  Follow up with PMD and cardiologist  Secondary Diagnosis:	CHF (congestive heart failure)  Instructions for follow-up, activity and diet:	Continue home meds  Follow up with PMD and cardiologist Principal Discharge DX:	Right leg weakness  Goal:	Work up negative for CVA  Instructions for follow-up, activity and diet:	Continue Lasix. Follow up with PMD.  For KASIA  Follow up with Dr. Gilmore for results of EEG  Secondary Diagnosis:	Anxiety  Instructions for follow-up, activity and diet:	Continue home meds  Secondary Diagnosis:	Essential hypertension  Instructions for follow-up, activity and diet:	Continue home blood pressure medications  Follow up with PCP  Low-sodium diet  AHA Recipes - [email or web address]  Secondary Diagnosis:	HLD (hyperlipidemia)  Instructions for follow-up, activity and diet:	Continue home statin medication  Follow up with PCP  Heart healthy diet - [email or web address]  Secondary Diagnosis:	CAD (coronary artery disease)  Instructions for follow-up, activity and diet:	Continue home meds  Follow up with PMD and cardiologist  Secondary Diagnosis:	CHF (congestive heart failure)  Instructions for follow-up, activity and diet:	Continue home meds  Follow up with PMD and cardiologist Principal Discharge DX:	Right leg weakness  Goal:	Work up negative for CVA  Instructions for follow-up, activity and diet:	Continue Lasix. Follow up with PMD.  For KASIA  Follow up with Dr. Gilmore for EEG  Secondary Diagnosis:	Anxiety  Instructions for follow-up, activity and diet:	Continue home meds  Secondary Diagnosis:	Essential hypertension  Instructions for follow-up, activity and diet:	Continue home blood pressure medications  Follow up with PCP  Low-sodium diet  AHA Recipes - [email or web address]  Secondary Diagnosis:	HLD (hyperlipidemia)  Instructions for follow-up, activity and diet:	Continue home statin medication  Follow up with PCP  Heart healthy diet - [email or web address]  Secondary Diagnosis:	CAD (coronary artery disease)  Instructions for follow-up, activity and diet:	Continue home meds  Follow up with PMD and cardiologist  Secondary Diagnosis:	CHF (congestive heart failure)  Instructions for follow-up, activity and diet:	Continue home meds  Follow up with PMD and cardiologist Principal Discharge DX:	Right leg weakness  Goal:	Work up negative for CVA  Instructions for follow-up, activity and diet:	Continue Lasix. Follow up with PMD.  For KASIA  Follow up with Dr. Gilmore for EEG  Secondary Diagnosis:	Anxiety  Instructions for follow-up, activity and diet:	Continue home meds  Secondary Diagnosis:	Essential hypertension  Instructions for follow-up, activity and diet:	Continue home blood pressure medications  Follow up with PCP  Low-sodium diet  AHA Recipes - [email or web address]  Secondary Diagnosis:	HLD (hyperlipidemia)  Instructions for follow-up, activity and diet:	Continue home statin medication  Follow up with PCP  Heart healthy diet - [email or web address]  Secondary Diagnosis:	CAD (coronary artery disease)  Instructions for follow-up, activity and diet:	Continue home meds  Follow up with PMD and cardiologist  Secondary Diagnosis:	CHF (congestive heart failure)  Instructions for follow-up, activity and diet:	Continue home meds  Follow up with PMD and cardiologist  Secondary Diagnosis:	Dysphagia  Instructions for follow-up, activity and diet:	Diet per speech & swallow recommendations Principal Discharge DX:	Right leg weakness  Goal:	Work up negative for CVA  Instructions for follow-up, activity and diet:	Continue Lasix. Follow up with PMD.  For KASIA  Follow up with Dr. Gilmore for EEG  Secondary Diagnosis:	Anxiety  Instructions for follow-up, activity and diet:	Continue home meds  Secondary Diagnosis:	Essential hypertension  Instructions for follow-up, activity and diet:	Continue home blood pressure medications  Follow up with PCP  Low-sodium diet  AHA Recipes - [email or web address]  Secondary Diagnosis:	HLD (hyperlipidemia)  Instructions for follow-up, activity and diet:	Continue home statin medication  Follow up with PCP  Heart healthy diet - [email or web address]  Secondary Diagnosis:	CAD (coronary artery disease)  Instructions for follow-up, activity and diet:	Continue home meds  Follow up with PMD and cardiologist  Secondary Diagnosis:	CHF (congestive heart failure)  Instructions for follow-up, activity and diet:	Continue home meds  Follow up with PMD and cardiologist  Secondary Diagnosis:	Dysphagia  Instructions for follow-up, activity and diet:	Continue on dysphagia diet

## 2017-10-09 NOTE — DISCHARGE NOTE ADULT - PLAN OF CARE
Work up negative for CVA Continue Lasix. Follow up with PMD.  For KASIA. Continue home meds Continue home blood pressure medications  Follow up with PCP  Low-sodium diet  AHA Recipes - [email or web address] Continue home statin medication  Follow up with PCP  Heart healthy diet - [email or web address] Continue home meds  Follow up with PMD and cardiologist Continue Lasix. Follow up with PMD.  For KASIA  Follow up with Dr. Gilmore for results of EEG Continue Lasix. Follow up with PMD.  For KASIA  Follow up with Dr. Gilmore for EEG Diet per speech & swallow recommendations Continue on dysphagia diet

## 2017-10-09 NOTE — DISCHARGE NOTE ADULT - CARE PROVIDERS DIRECT ADDRESSES
,DirectAddress_Unknown,DirectAddress_Unknown,[email or web address]

## 2017-10-09 NOTE — DISCHARGE NOTE ADULT - SECONDARY DIAGNOSIS.
Anxiety Essential hypertension HLD (hyperlipidemia) CAD (coronary artery disease) CHF (congestive heart failure) Dysphagia

## 2017-10-09 NOTE — DISCHARGE NOTE ADULT - HOSPITAL COURSE
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 left diastolic chf who was discharged from rehab 3 days ago p/w worsening RLE weakness. fmaily noticed it when she was discharged, has baseline weakness but has gotten worse to the point she is dragging her feet, no headache, no UE weakness that's new, no vision changes, no chagnes in her speech (has baseline slurred speech), no urinary incontinence that's changed, no back pain/injuries. Workup negative for CVA. Pt to continue home on PO Lasix and follow up with PMD and cardiologist.

## 2017-10-09 NOTE — PROGRESS NOTE ADULT - SUBJECTIVE AND OBJECTIVE BOX
Assessment:  CAD  Recent CABG, AVR  Sent home recently from REhab now with weakness  Labs reviewed  Neuro noted

## 2017-10-09 NOTE — DISCHARGE NOTE ADULT - PATIENT PORTAL LINK FT
“You can access the [facility] Patient Portal, offered by [facility], by registering with the following website: [email or web address]”

## 2017-10-10 VITALS
OXYGEN SATURATION: 98 % | SYSTOLIC BLOOD PRESSURE: 142 MMHG | TEMPERATURE: 98 F | RESPIRATION RATE: 16 BRPM | HEART RATE: 61 BPM | DIASTOLIC BLOOD PRESSURE: 65 MMHG

## 2017-10-10 LAB — GLUCOSE BLDC GLUCOMTR-MCNC: 118 MG/DL — HIGH (ref 70–99)

## 2017-10-10 PROCEDURE — 93971 EXTREMITY STUDY: CPT | Mod: 26,RT

## 2017-10-10 PROCEDURE — 70360 X-RAY EXAM OF NECK: CPT | Mod: 26

## 2017-10-10 PROCEDURE — 71010: CPT | Mod: 26

## 2017-10-10 RX ORDER — HEPARIN SODIUM 5000 [USP'U]/ML
5000 INJECTION INTRAVENOUS; SUBCUTANEOUS
Qty: 0 | Refills: 0 | COMMUNITY
Start: 2017-10-10

## 2017-10-10 RX ORDER — ONDANSETRON 8 MG/1
4 TABLET, FILM COATED ORAL ONCE
Qty: 0 | Refills: 0 | Status: COMPLETED | OUTPATIENT
Start: 2017-10-10 | End: 2017-10-10

## 2017-10-10 RX ADMIN — PANTOPRAZOLE SODIUM 40 MILLIGRAM(S): 20 TABLET, DELAYED RELEASE ORAL at 07:47

## 2017-10-10 RX ADMIN — Medication 20 MILLIGRAM(S): at 12:36

## 2017-10-10 RX ADMIN — Medication 40 MILLIGRAM(S): at 05:16

## 2017-10-10 RX ADMIN — Medication 81 MILLIGRAM(S): at 12:36

## 2017-10-10 RX ADMIN — ONDANSETRON 4 MILLIGRAM(S): 8 TABLET, FILM COATED ORAL at 12:35

## 2017-10-10 RX ADMIN — NYSTATIN CREAM 1 APPLICATION(S): 100000 CREAM TOPICAL at 05:17

## 2017-10-10 RX ADMIN — HEPARIN SODIUM 5000 UNIT(S): 5000 INJECTION INTRAVENOUS; SUBCUTANEOUS at 17:56

## 2017-10-10 RX ADMIN — AMIODARONE HYDROCHLORIDE 200 MILLIGRAM(S): 400 TABLET ORAL at 05:17

## 2017-10-10 RX ADMIN — Medication 5 MILLIGRAM(S): at 05:17

## 2017-10-10 RX ADMIN — HEPARIN SODIUM 5000 UNIT(S): 5000 INJECTION INTRAVENOUS; SUBCUTANEOUS at 05:17

## 2017-10-10 RX ADMIN — CLOPIDOGREL BISULFATE 75 MILLIGRAM(S): 75 TABLET, FILM COATED ORAL at 12:36

## 2017-10-10 RX ADMIN — SODIUM CHLORIDE 1 GRAM(S): 9 INJECTION INTRAMUSCULAR; INTRAVENOUS; SUBCUTANEOUS at 12:36

## 2017-10-10 NOTE — SWALLOW BEDSIDE ASSESSMENT ADULT - ORAL PHASE
Delayed oral transit time stasis cleared with liquid wash/Lingual stasis/Decreased anterior-posterior movement of the bolus Within functional limits

## 2017-10-10 NOTE — SWALLOW BEDSIDE ASSESSMENT ADULT - COMMENTS
CTneck 10/10/2017 Impression: The soft tissues of the neck appear within normal limits. No discrete radio-opaque foreign body seen.     CXR 10/10/2017 IMPRESSION: Support Devices: None Heart: Cardiomegaly Mediastinum:  Unremarkable Lungs/Airways: Left base segmental atelectasis Bones/Soft tissues: Unremarkable CTneck 10/10/2017 Impression: The soft tissues of the neck appear within normal limits. No discrete radio-opaque foreign body seen.     CXR 10/10/2017 IMPRESSION: Support Devices: None Heart: Cardiomegaly Mediastinum:  Unremarkable Lungs/Airways: Left base segmental atelectasis Bones/Soft tissues: Unremarkable    CT head 10/07/2017IMPRESSION:  Extensive chronic ischemic changes again identified, grossly stable compared with prior study.

## 2017-10-10 NOTE — SWALLOW BEDSIDE ASSESSMENT ADULT - PHARYNGEAL PHASE
Delayed pharyngeal swallow Within functional limits burping - ?reflux/Multiple swallows/Delayed pharyngeal swallow

## 2017-10-10 NOTE — SWALLOW BEDSIDE ASSESSMENT ADULT - SWALLOW EVAL: DIAGNOSIS
pt presented with oropharyngeal phases of swallow marked by slow oral transport/reduced manipulation, decreased mastication with reduced bolus formation causing lingual residue with soft solid-may be 2/2 pt edentulous but cleared with liquid wash, suspect slight delay in swallow trigger with adequate laryngeal elevation- multiple swallows with thin liquid and noted burping may be 2/2 GERD. no overt signs of aspiration noted with po trials

## 2017-10-10 NOTE — PROGRESS NOTE ADULT - SUBJECTIVE AND OBJECTIVE BOX
Patient is a 82y old  Female who presents with a chief complaint of SOB (09 Oct 2017 13:06)    MEDICAL PROBLEMS:  RIGHT LEG WEAKNESS  CVA (cerebral infarction)  Anxiety  HLD (hyperlipidemia)  HTN (hypertension)  Hypertension  Hyperlipidemia  Bilateral cataracts  Osteoarthritis  Myelodysplastic syndrome  H/O spinal stenosis  Aortic stenosis  Asthma  Myeloblastic anemia  Moe esophagus  H/O heart artery stent  CAD (coronary artery disease)  Right leg weakness  CHF (congestive heart failure)  CAD (coronary artery disease)  HLD (hyperlipidemia)  Essential hypertension  Anxiety      INTERVAL HPI/OVERNIGHT EVENTS: No dyspnea, no CP    MEDICATIONS  (STANDING):  amiodarone    Tablet 200 milliGRAM(s) Oral daily  aspirin enteric coated 81 milliGRAM(s) Oral daily  atorvastatin 80 milliGRAM(s) Oral at bedtime  clopidogrel Tablet 75 milliGRAM(s) Oral daily  furosemide   Injectable 40 milliGRAM(s) IV Push daily  heparin  Injectable 5000 Unit(s) SubCutaneous every 12 hours  nystatin Powder 1 Application(s) Topical three times a day  oxybutynin 5 milliGRAM(s) Oral three times a day  pantoprazole    Tablet 40 milliGRAM(s) Oral before breakfast  PARoxetine 20 milliGRAM(s) Oral daily  QUEtiapine 50 milliGRAM(s) Oral at bedtime  sodium chloride 1 Gram(s) Oral daily    MEDICATIONS  (PRN):  ALBUTerol/ipratropium for Nebulization 3 milliLiter(s) Nebulizer every 8 hours PRN Bronchospasm  diazepam    Tablet 5 milliGRAM(s) Oral at bedtime PRN anxiety    Allergies    codeine (Unknown)    Intolerances      Vital Signs Last 24 Hrs  T(C): 36.1 (10 Oct 2017 05:44), Max: 37.4 (09 Oct 2017 17:21)  T(F): 97 (10 Oct 2017 05:44), Max: 99.4 (09 Oct 2017 17:21)  HR: 60 (10 Oct 2017 05:44) (57 - 68)  BP: 130/64 (10 Oct 2017 05:44) (126/73 - 149/70)  BP(mean): --  RR: 16 (10 Oct 2017 05:44) (16 - 17)  SpO2: 96% (10 Oct 2017 05:44) (96% - 100%)    PHYSICAL EXAM:  GENERAL: NAD, well-groomed, well-developed  HEAD:  Atraumatic, Normocephalic  EYES: EOMI, PERRLA, conjunctiva and sclera clear  ENMT: No tonsillar erythema, exudates, or enlargement; Moist mucous membranes, Good dentition, No lesions  NECK: Supple, No JVD, Normal thyroid  NERVOUS SYSTEM:  Alert & Oriented X3, Good concentration; Motor Strength 5/5 B/L upper and lower extremities; DTRs 2+ intact and symmetric  CHEST/LUNG: Clear to percussion bilaterally; No rales, rhonchi, wheezing, or rubs  HEART: Regular rate and rhythm; No murmurs, rubs, or gallops  ABDOMEN: Soft, Nontender, Nondistended; Bowel sounds present  EXTREMITIES:  2+ Peripheral Pulses, No clubbing, cyanosis, or edema  LYMPH: No lymphadenopathy noted  SKIN: No rashes or lesions    10-09 @ 07:01  -  10-10 @ 07:00  --------------------------------------------------------  IN: 160 mL / OUT: 0 mL / NET: 160 mL        LABS:    10-09    141  |  106  |  40<H>  ----------------------------<  84  4.1   |  25  |  2.04<H>    Ca    8.4<L>      09 Oct 2017 06:23          CAPILLARY BLOOD GLUCOSE        Troponin I, Serum: .024 ng/mL (10-09 @ 06:23)  Troponin I, Serum: .016 ng/mL (10-07 @ 15:14)    Cultures:    Culture - Urine (collected 07 Oct 2017 21:31)  Source: .Urine Clean Catch (Midstream)  Final Report (08 Oct 2017 19:09):    Culture grew 3 or more types of organisms which indicate    collection contamination; consider recollection only if clinically    indicated.              RADIOLOGY & ADDITIONAL TESTS:    Imaging Personally Reviewed:  [X] YES  [ ] NO    Consultant(s) Notes Reviewed:  [X] YES  [ ] NO    Care Discussed with Consultants/Other Providers [X] YES  [ ] NO

## 2017-10-10 NOTE — SWALLOW BEDSIDE ASSESSMENT ADULT - SWALLOW EVAL: RECOMMENDED FEEDING/EATING TECHNIQUES
allow for swallow between intakes/crush medication (when feasible)/maintain upright posture during/after eating for 30 mins/small sips/bites/check mouth frequently for oral residue/pocketing

## 2017-10-10 NOTE — PROGRESS NOTE ADULT - SUBJECTIVE AND OBJECTIVE BOX
Assessment:  CAD  Recent CABG, AVR  Sent home recently from REhab now with weakness  Labs reviewed  Neuro noted  DC

## 2017-10-10 NOTE — PROGRESS NOTE ADULT - ASSESSMENT
awakw alerr speechnfluent old left cva r hemepresis for pt sub acute rehab  will follow
Plan: Continue IV Lasix. PT
Plan: Stable to be discharged to rehab. for unsteady gait
Plan: Stable to go to rehab with oral Lasix

## 2017-10-10 NOTE — SWALLOW BEDSIDE ASSESSMENT ADULT - SLP PERTINENT HISTORY OF CURRENT PROBLEM
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 left diastolic chf who was discharged from rehab 3 days ago p/w worsening RLE weakness. fmaily noticed it when she was discharged, has baseline weakness but has gotten worse to the point she is dragging her feet, no headache, no UE weakness that's new, no vision changes, no chagnes in her speech (has baseline slurred speech), no urinary incontinence that's changed, no back pain/injuries (10/07/2017)

## 2017-10-10 NOTE — CHART NOTE - NSCHARTNOTEFT_GEN_A_CORE
Hospitalist Medicine PA    CC: RRT called for pt choking on lunch.  HPI: Patient is a 82y old Female admitted for R LE Weakness. Pt seen and examined at bedside. Pt coughing and unable to speak. Spitting up particles of lunch. Per RN patient is typically alert and able to talk. Pt suctioned and able to verbalize that she still feels food in her throat.    VITALS:  HR: 108  BP: 195/100  RR: 22  SpO2: 98% on RA  T:    PHYSICAL EXAM:  General: Mild respiratory distress. Coughing up food.  Eyes: Conjunctiva and sclera clear.  Lungs: Airway patent. CTA B/L. Normal breath sounds. No wheezes, rales, rhonchi.  Heart: RRR. Normal S1/S2. No murmurs appreciated.  Abdomen: Soft, NT/ND. Normoactive BS x 4 quadrants.  Neurological:  Alert. Awake. Good concentration. Moves all 4 extremities. Decreased strength to R UE.  Extremities:  2+ B/L peripheral pulses. No clubbing, cyanosis, or edema.    LABS:  POCT Blood Glucose.: 118    10-09    141  |  106  |  40<H>  ----------------------------<  84  4.1   |  25  |  2.04<H>    Ca    8.4<L>      09 Oct 2017 06:23    CARDIAC MARKERS ( 09 Oct 2017 06:23 )  .024 ng/mL / x     / 129 U/L / x     / x             ASSESSMENT:  Patient is a 82y with RIGHT LEG WEAKNESS. Now with choking.  Hx of CVA (cerebral infarction)    PLAN:  - Hold discharge for now  - Suction PRN  - S&S eval for dysphagia diet  - NPO until S&S recommendation made  - STAT CXR and neck xray  - Repeat BP in 30 mins  - Continue current treatment  - Follow up xrays  - D/w Dr. Malloy aware and agree with the plan  - Will continue to follow up Hospitalist Medicine PA    CC: RRT called for pt choking on lunch.  HPI: Patient is a 82y old Female admitted for R LE Weakness. Pt seen and examined at bedside. Pt coughing and unable to speak. Spitting up particles of lunch. Per RN patient is typically alert and able to talk. Pt suctioned and able to verbalize that she still feels food in her throat.    VITALS:  HR: 108  BP: 195/100  RR: 22  SpO2: 98% on RA  T:    PHYSICAL EXAM:  General: Mild respiratory distress. Coughing up food.  Eyes: Conjunctiva and sclera clear.  Lungs: Airway patent. CTA B/L. Normal breath sounds. No wheezes, rales, rhonchi.  Heart: RRR. Normal S1/S2. No murmurs appreciated.  Abdomen: Soft, NT/ND. Normoactive BS x 4 quadrants.  Neurological:  Alert. Awake. Good concentration. Moves all 4 extremities. Decreased strength to R UE.  Extremities:  2+ B/L peripheral pulses. No clubbing, cyanosis, or edema.    LABS:  POCT Blood Glucose.: 118    10-09    141  |  106  |  40<H>  ----------------------------<  84  4.1   |  25  |  2.04<H>    Ca    8.4<L>      09 Oct 2017 06:23    CARDIAC MARKERS ( 09 Oct 2017 06:23 )  .024 ng/mL / x     / 129 U/L / x     / x             ASSESSMENT:  Patient is a 82y with RIGHT LEG WEAKNESS. Now with choking.  Hx of CVA (cerebral infarction)    PLAN:  - Hold discharge for now  - Suction PRN  - S&S eval for dysphagia diet  - NPO until S&S recommendation made  - STAT CXR and neck xray  - Repeat BP in 30 mins  - Continue current treatment  - Follow up xrays  - D/w Dr. Malloy made aware  - Will continue to follow up

## 2017-11-22 ENCOUNTER — APPOINTMENT (OUTPATIENT)
Dept: CARDIOLOGY | Facility: CLINIC | Age: 82
End: 2017-11-22
Payer: MEDICARE

## 2017-11-22 ENCOUNTER — NON-APPOINTMENT (OUTPATIENT)
Age: 82
End: 2017-11-22

## 2017-11-22 VITALS
WEIGHT: 146 LBS | OXYGEN SATURATION: 98 % | BODY MASS INDEX: 29.49 KG/M2 | DIASTOLIC BLOOD PRESSURE: 60 MMHG | HEART RATE: 56 BPM | SYSTOLIC BLOOD PRESSURE: 144 MMHG

## 2017-11-22 DIAGNOSIS — Z95.2 PRESENCE OF PROSTHETIC HEART VALVE: ICD-10-CM

## 2017-11-22 DIAGNOSIS — I67.9 CEREBROVASCULAR DISEASE, UNSPECIFIED: ICD-10-CM

## 2017-11-22 DIAGNOSIS — R06.02 SHORTNESS OF BREATH: ICD-10-CM

## 2017-11-22 DIAGNOSIS — I10 ESSENTIAL (PRIMARY) HYPERTENSION: ICD-10-CM

## 2017-11-22 DIAGNOSIS — I25.10 ATHEROSCLEROTIC HEART DISEASE OF NATIVE CORONARY ARTERY W/OUT ANGINA PECTORIS: ICD-10-CM

## 2017-11-22 DIAGNOSIS — I69.359 HEMIPLEGIA AND HEMIPARESIS FOLLOWING CEREBRAL INFARCTION AFFECTING UNSPECIFIED SIDE: ICD-10-CM

## 2017-11-22 DIAGNOSIS — E78.5 HYPERLIPIDEMIA, UNSPECIFIED: ICD-10-CM

## 2017-11-22 DIAGNOSIS — I08.0 RHEUMATIC DISORDERS OF BOTH MITRAL AND AORTIC VALVES: ICD-10-CM

## 2017-11-22 PROCEDURE — 99215 OFFICE O/P EST HI 40 MIN: CPT

## 2017-11-22 PROCEDURE — 93000 ELECTROCARDIOGRAM COMPLETE: CPT

## 2017-11-22 RX ORDER — PAROXETINE HYDROCHLORIDE 20 MG/1
20 TABLET, FILM COATED ORAL
Qty: 90 | Refills: 3 | Status: ACTIVE | COMMUNITY
Start: 2017-11-22

## 2017-11-22 RX ORDER — RANITIDINE 300 MG/1
300 TABLET ORAL
Qty: 90 | Refills: 0 | Status: ACTIVE | COMMUNITY
Start: 2017-11-22

## 2017-11-24 ENCOUNTER — NON-APPOINTMENT (OUTPATIENT)
Age: 82
End: 2017-11-24

## 2017-11-24 PROBLEM — R06.02 EXERTIONAL SHORTNESS OF BREATH: Status: ACTIVE | Noted: 2017-06-22

## 2017-11-24 PROBLEM — I08.0 MITRAL AND AORTIC VALVE DISEASE: Status: ACTIVE | Noted: 2017-06-22

## 2018-01-05 RX ORDER — NEBULIZER ACCESSORIES
KIT MISCELLANEOUS
Qty: 1 | Refills: 0 | Status: ACTIVE | COMMUNITY
Start: 2018-01-05 | End: 1900-01-01

## 2018-03-05 ENCOUNTER — RX RENEWAL (OUTPATIENT)
Age: 83
End: 2018-03-05

## 2018-07-27 NOTE — PATIENT PROFILE ADULT. - CAREGIVER OBTAIN DETAILS
Spine appears normal, range of motion is not limited, no muscle or joint tenderness pt unable to tell

## 2018-09-14 NOTE — H&P ADULT - NSHPPOAPULMEMBOLUS_GEN_A_CORE
SBAR IN Report: Mother Verbal report received from [address] on this patient, who is now being transferred from L&D (unit) for routine progression of care. The patient is wearing a green \"Anesthesia-Duramorph\" band. Report consisted of patient's Situation, Background, Assessment and Recommendations (SBAR).  ID bands were compared with the identification form, and verified with the patient and transferring nurse. Information from the SBAR and the [place] Report was reviewed with the transferring nurse; opportunity for questions and clarification provided.
no

## 2018-10-05 NOTE — PROGRESS NOTE ADULT - PROBLEM SELECTOR PLAN 1
- s/p left heart catheterization via R femoral artery 8/22 w/ placement of 2 drug eluting stents (L main and circumflex arteries)  - no chest pain or SOB post procedure  - On aspirin, plavix, statin tho holding ACEI/ BB 2/2 low BP + NANDO   - TTE w/ EF of 84 but mod-severe MR and dialted LA, hyperdynamic LV, normal RV  - Repeat troponins   - F/u EKG Admission

## 2018-11-06 NOTE — ED ADULT TRIAGE NOTE - MEANS OF ARRIVAL
[Takes medication as prescribed] : takes [None] : Patient does not have any barriers to medication adherence stretcher

## 2018-11-07 NOTE — DISCHARGE NOTE ADULT - PATIENT PORTAL LINK FT
“You can access the FollowHealth Patient Portal, offered by [facility], by registering with the following website: [email or web address]”
No

## 2020-12-06 NOTE — SWALLOW BEDSIDE ASSESSMENT ADULT - SLP GENERAL OBSERVATIONS
NO CHANGES TO REPORT. DOZES ON AND OFF. MONITOR AFIB, HR 80'S-90'S. pt seen bedside after CXR in radiology, alert and oriented to self. pt responded to simple autobiographical questions with poor to fair accuracy, verbalized simple wants and she was able to follow one step directions with models.

## 2021-10-27 NOTE — DISCHARGE NOTE ADULT - NS DC ANGIO PCI YN
Physical Therapy Daily Treatment    Visit Count: 2    Precautions: recent left shoulder surgery    SUBJECTIVE     Patient reports that dizziness is still present every time she lays down but the intensity of the dizziness is less.  Also the dizziness does not last for as long.      Current Pain (0-10 scale): 0       OBJECTIVE      Positional Exam:   [place]-Hallpike Test   (anterior and posterior canal) Result       Left Negative       Right Positive:  Nystagmus duration: 15 seconds  Nystagmus direction: strong upbeating rotational       Roll Test (horizontal canal)         Left Negative       Right Positive:  Nystagmus duration: 10 seconds  Nystagmus direction: lateral      Comments:     Treatment     Canalith Repositioning:  Epley's maneuver for right posterior canal x 3 with each position held for 30 seconds after dizziness stopped.    Educated patient in self correction for home.     Skilled input: verbal instruction/cues, positional exam findings, Epley's manuever and self correction    Home Program:   Self Epley's maneuver for right involvement    Writer verbally educated the patient and received verbal consent from the patient on hand placement, positioning of patient, and techniques to be performed today including therapist position for techniques, hand placement and palpation for techniques as described above and how they are pertinent to the patient's plan of care.      Suggestions for next session as indicated: progress per plan of care, recheck positional exam and correct as needed.      ASSESSMENT     Patient continues to have testing positive for right posterior and horizontal canal involvement.  Testing in the posterior canal provoked strong nystagmus and was addressed today with canalith repositioning.  Patient responded well to treatment with less dizziness with each correction.  Patient will not be seen for treatment of her dizziness for 2 weeks so she was provided a self correction for use at home.   She demonstrated a very good understanding of the self correction and it was stressed that she is to discontinue self correction if dizziness should resolve.     Pain after treatment (patient reported, 0-10 scale): 0  Result of above outlined education: Verbalizes understanding and Demonstrates understanding    Therapy procedure time and total treatment time can be found documented on the Time Entry flowsheet   yes

## 2022-03-03 NOTE — PHYSICAL THERAPY INITIAL EVALUATION ADULT - DID THE PATIENT HAVE SURGERY?
Did send refill but she cannot fill until 3/5, PDMP reviewed; no aberrant behavior identified, prescription authorized. , n/a

## 2022-03-18 NOTE — DISCHARGE NOTE ADULT - NS AS ACTIVITY OBS
No
No Heavy lifting/straining/Bathing allowed/Walking-Outdoors allowed/Do not make important decisions/Do not drive or operate machinery/Walking-Indoors allowed

## 2023-04-24 NOTE — PATIENT PROFILE ADULT. - HEALTH/HEALTHCARE ANXIETIES, PROFILE
pt unable to tell Orbicularis Oris Muscle Flap Text: The defect edges were debeveled with a #15 scalpel blade.  Given that the defect affected the competency of the oral sphincter an orbicularis oris muscle flap was deemed most appropriate to restore this competency and normal muscle function.  Using a sterile surgical marker, an appropriate flap was drawn incorporating the defect. The area thus outlined was incised with a #15 scalpel blade.

## 2023-09-07 NOTE — PROGRESS NOTE ADULT - PROBLEM/PLAN-2
DISPLAY PLAN FREE TEXT
N: consistent carb diet   E: replete PRN  DVT: lovenox subq   Code:   Dispo:

## 2025-05-25 NOTE — DISCHARGE NOTE ADULT - PROVIDER TOKENS
Pharmacy Medication History Review    Delphine Larson is a 64 y.o. female admitted for Palpitations. Pharmacy reviewed the patient's [id number] medications and allergies for accuracy.    Medications ADDED:  None  Medications CHANGED:  None  Medications REMOVED / NOT TAKING   None    The list below reflects the updated PTA list.   Prior to Admission Medications   Prescriptions Last Dose Informant   FLUoxetine (PROzac) 40 mg capsule 5/23/2025 Morning Self   Sig: Take 1 capsule (40 mg) by mouth once daily.   amiodarone (Pacerone) 200 mg tablet Unknown Self   Sig: Take 1 tablet (200 mg) by mouth once daily.   atorvastatin (Lipitor) 80 mg tablet 5/23/2025 Bedtime Self   Sig: Take 1 tablet (80 mg) by mouth once daily at bedtime.   calcium carbonate (Tums) 500 mg (200 mg elemental) chewable tablet 5/24/2025 Bedtime Self   Sig: Chew 2 tablets (1,000 mg) 2 times a day.   cholecalciferol (Vitamin D-3) 50 mcg (2,000 units) tablet 5/24/2025 Morning Self   Sig: Take 1 tablet (50 mcg) by mouth once daily.   dapagliflozin propanediol (Farxiga) 10 mg tablet 5/23/2025 Morning Self   Sig: Take 1 tablet (10 mg) by mouth once daily.   digoxin (Lanoxin) 125 MCG tablet 5/23/2025 Morning Self   Sig: Take 1 tablet (125 mcg) by mouth once daily.   levothyroxine (Synthroid, Levoxyl) 150 mcg tablet 5/24/2025 Morning Self   Sig: Take 1 tablet (150 mcg) by mouth early in the morning.. Take on an empty stomach at the same time each day, either 30 to 60 minutes prior to breakfast   magnesium oxide (Mag-Ox) 400 mg (241.3 mg elemental) tablet 5/23/2025 Morning Self   Sig: Take 1 tablet (400 mg) by mouth 2 times a day.   mirtazapine (Remeron) 15 mg tablet 5/24/2025 Bedtime Self   Sig: Take 1 tablet (15 mg) by mouth once daily at bedtime.   omega-3 acid ethyl esters (Lovaza) 1 gram capsule 5/23/2025 Bedtime Self   Sig: Take 2 capsules (2 g) by mouth 2 times a day.   pantoprazole (ProtoNix) 40 mg EC tablet 5/24/2025 Morning Self   Sig: Take  1 tablet (40 mg) by mouth once daily in the morning. Take before meals. Do not crush, chew, or split.   sildenafil (Revatio) 20 mg tablet 5/23/2025 Bedtime Self   Sig: Take 1 tablet (20 mg) by mouth 3 times a day.   spironolactone (Aldactone) 25 mg tablet 5/23/2025 Morning Self   Sig: Take 1 tablet (25 mg) by mouth once daily.   torsemide (Demadex) 20 mg tablet Past Week Morning Self   Sig: Take 1 tablet (20 mg) by mouth once daily as needed (For difficulty breathing or weight gain > 5 pounds in 24 hours or increased lower extremity swelling.).   warfarin (Coumadin) 1 mg tablet 5/23/2025 Morning Self   Sig: Take one tablet in addition to 2 mg tablet (total 3mg) on Mondays, Wednesdays and Fridays. Do not fill before May 9, 2025.   warfarin (Coumadin) 2 mg tablet 5/23/2025 Morning Self   Sig: Take one 2mg tablet (total 2mg) on Saturdays, Sundays, Tuesdays and Thursdays. Take one 2mg tablet and one 1mg tablet (total 3mg) on Mondays, Wednesdays and Fridays.      Facility-Administered Medications: None        The list below reflects the updated allergy list. Please review each documented allergy for additional clarification and justification.  Allergies  Reviewed by Tania Mazariegos on 5/24/2025   No Known Allergies         Patient declines M2B at discharge.     Sources:   [place]ARRS - 03/20/2025 Oxycodone Hcl ( IR ) 5 mg tablet 14 # / 7 days   Patient interview  Epic medication dispense hx report    chart review    admission med rec grid   CE   05/08/2025 Discharge Summary with RIVERA Hutton CNP   05/07/2025 Progress Notes with RIVERA Hutton CNP    Additional Comments:  Pt reports Amiodarone 200 mg tablets were discontinued by providers on 05/08 however according to the 05/08 discharge summary patient was instructed to continue taking Amiodarone 200 mg tabs and stop taking Aspirin 81 mg tabs . Patient may have these medications confused with one another.     No other concerns , pt is a good historian and  "appears adherent to all current home medications.      Tania Mazariegos  Pharmacy Technician  05/24/25     Secure Chat preferred   If no response call [id number] or Pin-Digital \"Med Rec\"   " TOKEN:'[id number]'

## 2025-06-12 NOTE — ED ADULT NURSE NOTE - TEMPLATE LIST FOR HEAD TO TOE ASSESSMENT
[FreeTextEntry1] : 75F, former smoker, with PMH of CVA (7/2012, treated at [facility], no residual), psoriasis, OA, renal mass thought to be a normal variant (saw Dr. Christy Mckeon at [facility]), HTN, HLD, and moderate to severe MR who presents for follow up.  The patient is clinically stable; NYHA Class III.  The ECHO was independently reviewed by Dr. Herrera which now shows moderate to severe mitral regurgitation.  Due to the patient's increased symptoms over the last few months, Dr. Herrera recommends intervention on the mitral valve.  mTEER, medical management, and surgery options discussed with the patient and her daughter via telephone.  Dr. Herrera recommends the patient have a consultation with Dr. Salazar for an interdisciplinary discussion to determine best intervention plan for the patient.  Dr. Herrera will discuss with Dr. Hercules any medication management that can be offered at this time.  The plan was discussed with the patient and her daughter.  All questions answered.    General

## 2025-06-12 NOTE — DISCHARGE NOTE ADULT - PLAN OF CARE
All other review of systems negative, except as noted in HPI You will not experience any persistent chest pain. Your troponins will be downtrending/ stable You can resume your previous normal level of activity. You should ambulate as you are able and get out of bed into a chair with assistance. You should continue taking your aspirin, plavix, and amiodarone, and furosemide as directed. If you experience any persistent chest pain along w/ shortness of breath or dyspnea, you should get evaluated immediately. You should continue your fluid restriction to 1 L as your sodium has been low because of excessive water intake and will remain normal if you take in less fluids. You should make sure you follow-up with your primary care doctor to monitor your sodium level. Your baseline creatinine per records was about1.4-1.7 through January 2017 but increased to 2.34 in June of 2017 and 2.39 July 2017. Your discharge creatinine is 1.99 and has been stable over the past few days. You should make sure to drink enough fluids (about 1 liter) and should follow-up with your primary care doctor to monitor your kidney function. If you begin to produce significantly less urine than usual you should get evaluated immediately. You should continue taking your Aspirin, Plavix, and Atorvastatin as above. You finished your course of antibiotics in the hospital. You took Ceftriaxone and Azithromycin. You should return to your normal level of activity and ambulate as tolerated. If you experience difficulty breathing, shortness of breath, cough, or fever, you should be evaluated immediately. Your antibiotics also covered your UTI. Your urine culture grew e.coli which were sensitive to everything except Ampicillin and Ampicillin/ Sulfabactam. You were treated with Ceftriaxone and Azithromycin, which also covered your pneumonia. If you begin to experience painful urination, blood in your urine, flank/ back pain, or fevers, you should be evaluated immediately. You should continue taking your statin drug as directed and should also attempt to follow a diet which is low in cholesterol. You should return to your primary doctor for your cholesterol to be monitored. You should continue your fluid restriction to 1 L as your sodium has been low because of excessive water intake and will remain normal if you take in less fluids. You should make sure you follow-up with your primary care doctor to monitor your sodium level. Your discharge sodium is 135. Your baseline creatinine per records was about1.4-1.7 through January 2017 but increased to 2.34 in June of 2017 and 2.39 July 2017. Your discharge creatinine is 2.03 and has been stable over the past few days. You should make sure to drink enough fluids (about 1 liter) and should follow-up with your primary care doctor to monitor your kidney function. If you begin to produce significantly less urine than usual you should get evaluated immediately. You finished your course of antibiotics in the hospital. You took Ceftriaxone and Azithromycin for > 7 days. You should return to your normal level of activity and ambulate as tolerated. If you experience difficulty breathing, shortness of breath, cough, or fever, you should be evaluated immediately. Continued medical mangament. You can resume your previous normal level of activity. You should ambulate as you are able and get out of bed into a chair with assistance. You should continue taking your aspirin, plavix, and amiodarone, and furosemide as directed. If you experience any persistent chest pain along w/ shortness of breath or dyspnea, please report to the ED. Please take your medications as directed. You should continue taking your Aspirin, Plavix, and Atorvastatin as above. Please speak with your cardiologist about amnidarone as outpatient. Please perform a TOV in 3 days at the rehab with ambulation. You had urinary retention s/p failed TOV on 8/29. You should continue your fluid restriction to 1 L as your sodium has been low because of excessive water intake and will remain normal if you take in less fluids. You should make sure you follow-up with your primary care doctor to monitor your sodium level. Your discharge sodium is 135.If your sodium normalizes, please have your doctor discontinue your salt tabs. Please see your PMD in 1 week of discharge. Please check a BMP in 1 week of discharge. Your baseline creatinine per records was about1.4-1.7 through January 2017 but increased to 2.34 in June of 2017 and 2.39 July 2017. Your discharge creatinine is 2.03 and has been stable over the past few days. Please follow-up with your primary care doctor to monitor your kidney function in 1 week of discharge.